# Patient Record
Sex: MALE | Race: WHITE | Employment: OTHER | URBAN - METROPOLITAN AREA
[De-identification: names, ages, dates, MRNs, and addresses within clinical notes are randomized per-mention and may not be internally consistent; named-entity substitution may affect disease eponyms.]

---

## 2019-12-01 ENCOUNTER — APPOINTMENT (OUTPATIENT)
Dept: ULTRASOUND IMAGING | Age: 74
DRG: 309 | End: 2019-12-01
Attending: EMERGENCY MEDICINE
Payer: MEDICARE

## 2019-12-01 ENCOUNTER — APPOINTMENT (OUTPATIENT)
Dept: CT IMAGING | Age: 74
DRG: 309 | End: 2019-12-01
Attending: EMERGENCY MEDICINE
Payer: MEDICARE

## 2019-12-01 ENCOUNTER — APPOINTMENT (OUTPATIENT)
Dept: GENERAL RADIOLOGY | Age: 74
DRG: 309 | End: 2019-12-01
Attending: EMERGENCY MEDICINE
Payer: MEDICARE

## 2019-12-01 ENCOUNTER — HOSPITAL ENCOUNTER (INPATIENT)
Age: 74
LOS: 4 days | Discharge: HOME | DRG: 309 | End: 2019-12-05
Attending: EMERGENCY MEDICINE | Admitting: INTERNAL MEDICINE
Payer: MEDICARE

## 2019-12-01 DIAGNOSIS — Z85.528 HISTORY OF RENAL CELL CARCINOMA: ICD-10-CM

## 2019-12-01 DIAGNOSIS — R07.9 ACUTE CHEST PAIN: Primary | ICD-10-CM

## 2019-12-01 DIAGNOSIS — Z86.79 HISTORY OF CORONARY ARTERY DISEASE: ICD-10-CM

## 2019-12-01 DIAGNOSIS — R31.9 HEMATURIA, UNSPECIFIED TYPE: ICD-10-CM

## 2019-12-01 DIAGNOSIS — I48.91 ATRIAL FIBRILLATION, UNSPECIFIED TYPE (HCC): ICD-10-CM

## 2019-12-01 PROBLEM — C64.9 RENAL CELL CARCINOMA (HCC): Status: ACTIVE | Noted: 2019-12-01

## 2019-12-01 LAB
ALBUMIN SERPL-MCNC: 3.2 G/DL (ref 3.5–5)
ALBUMIN/GLOB SERPL: 0.8 {RATIO} (ref 1.1–2.2)
ALP SERPL-CCNC: 691 U/L (ref 45–117)
ALT SERPL-CCNC: 57 U/L (ref 12–78)
ANION GAP SERPL CALC-SCNC: 12 MMOL/L (ref 5–15)
APPEARANCE UR: CLEAR
AST SERPL-CCNC: 53 U/L (ref 15–37)
BACTERIA URNS QL MICRO: NEGATIVE /HPF
BASOPHILS # BLD: 0.1 K/UL (ref 0–0.1)
BASOPHILS NFR BLD: 1 % (ref 0–1)
BILIRUB SERPL-MCNC: 0.7 MG/DL (ref 0.2–1)
BILIRUB UR QL: NEGATIVE
BUN SERPL-MCNC: 40 MG/DL (ref 6–20)
BUN/CREAT SERPL: 25 (ref 12–20)
CALCIUM SERPL-MCNC: 9.3 MG/DL (ref 8.5–10.1)
CHLORIDE SERPL-SCNC: 99 MMOL/L (ref 97–108)
CK SERPL-CCNC: 228 U/L (ref 39–308)
CO2 SERPL-SCNC: 27 MMOL/L (ref 21–32)
COLOR UR: ABNORMAL
CREAT SERPL-MCNC: 1.63 MG/DL (ref 0.7–1.3)
DIFFERENTIAL METHOD BLD: ABNORMAL
EOSINOPHIL # BLD: 0.1 K/UL (ref 0–0.4)
EOSINOPHIL NFR BLD: 1 % (ref 0–7)
EPITH CASTS URNS QL MICRO: ABNORMAL /LPF
ERYTHROCYTE [DISTWIDTH] IN BLOOD BY AUTOMATED COUNT: 18.6 % (ref 11.5–14.5)
GLOBULIN SER CALC-MCNC: 4 G/DL (ref 2–4)
GLUCOSE BLD STRIP.AUTO-MCNC: 224 MG/DL (ref 65–100)
GLUCOSE BLD STRIP.AUTO-MCNC: 251 MG/DL (ref 65–100)
GLUCOSE SERPL-MCNC: 144 MG/DL (ref 65–100)
GLUCOSE UR STRIP.AUTO-MCNC: NEGATIVE MG/DL
HCT VFR BLD AUTO: 45.2 % (ref 36.6–50.3)
HGB BLD-MCNC: 14.5 G/DL (ref 12.1–17)
HGB UR QL STRIP: NEGATIVE
IMM GRANULOCYTES # BLD AUTO: 0.1 K/UL (ref 0–0.04)
IMM GRANULOCYTES NFR BLD AUTO: 1 % (ref 0–0.5)
KETONES UR QL STRIP.AUTO: NEGATIVE MG/DL
LEUKOCYTE ESTERASE UR QL STRIP.AUTO: NEGATIVE
LYMPHOCYTES # BLD: 2.1 K/UL (ref 0.8–3.5)
LYMPHOCYTES NFR BLD: 20 % (ref 12–49)
MCH RBC QN AUTO: 29.7 PG (ref 26–34)
MCHC RBC AUTO-ENTMCNC: 32.1 G/DL (ref 30–36.5)
MCV RBC AUTO: 92.4 FL (ref 80–99)
MONOCYTES # BLD: 1 K/UL (ref 0–1)
MONOCYTES NFR BLD: 10 % (ref 5–13)
NEUTS SEG # BLD: 7.3 K/UL (ref 1.8–8)
NEUTS SEG NFR BLD: 67 % (ref 32–75)
NITRITE UR QL STRIP.AUTO: NEGATIVE
NRBC # BLD: 0 K/UL (ref 0–0.01)
NRBC BLD-RTO: 0 PER 100 WBC
PH UR STRIP: 6.5 [PH] (ref 5–8)
PLATELET # BLD AUTO: 269 K/UL (ref 150–400)
PMV BLD AUTO: 8.6 FL (ref 8.9–12.9)
POTASSIUM SERPL-SCNC: 3.3 MMOL/L (ref 3.5–5.1)
PROT SERPL-MCNC: 7.2 G/DL (ref 6.4–8.2)
PROT UR STRIP-MCNC: 30 MG/DL
RBC # BLD AUTO: 4.89 M/UL (ref 4.1–5.7)
RBC #/AREA URNS HPF: ABNORMAL /HPF (ref 0–5)
SERVICE CMNT-IMP: ABNORMAL
SERVICE CMNT-IMP: ABNORMAL
SODIUM SERPL-SCNC: 138 MMOL/L (ref 136–145)
SP GR UR REFRACTOMETRY: 1.01 (ref 1–1.03)
TROPONIN I SERPL-MCNC: <0.05 NG/ML
UA: UC IF INDICATED,UAUC: ABNORMAL
UROBILINOGEN UR QL STRIP.AUTO: 1 EU/DL (ref 0.2–1)
WBC # BLD AUTO: 10.6 K/UL (ref 4.1–11.1)
WBC URNS QL MICRO: ABNORMAL /HPF (ref 0–4)

## 2019-12-01 PROCEDURE — 81001 URINALYSIS AUTO W/SCOPE: CPT

## 2019-12-01 PROCEDURE — 76770 US EXAM ABDO BACK WALL COMP: CPT

## 2019-12-01 PROCEDURE — 99285 EMERGENCY DEPT VISIT HI MDM: CPT

## 2019-12-01 PROCEDURE — 96375 TX/PRO/DX INJ NEW DRUG ADDON: CPT

## 2019-12-01 PROCEDURE — 96374 THER/PROPH/DIAG INJ IV PUSH: CPT

## 2019-12-01 PROCEDURE — 82550 ASSAY OF CK (CPK): CPT

## 2019-12-01 PROCEDURE — 82962 GLUCOSE BLOOD TEST: CPT

## 2019-12-01 PROCEDURE — 74011250637 HC RX REV CODE- 250/637: Performed by: EMERGENCY MEDICINE

## 2019-12-01 PROCEDURE — 74011250636 HC RX REV CODE- 250/636: Performed by: EMERGENCY MEDICINE

## 2019-12-01 PROCEDURE — 71045 X-RAY EXAM CHEST 1 VIEW: CPT

## 2019-12-01 PROCEDURE — 65660000000 HC RM CCU STEPDOWN

## 2019-12-01 PROCEDURE — 36415 COLL VENOUS BLD VENIPUNCTURE: CPT

## 2019-12-01 PROCEDURE — 85025 COMPLETE CBC W/AUTO DIFF WBC: CPT

## 2019-12-01 PROCEDURE — 84484 ASSAY OF TROPONIN QUANT: CPT

## 2019-12-01 PROCEDURE — 74011250636 HC RX REV CODE- 250/636: Performed by: INTERNAL MEDICINE

## 2019-12-01 PROCEDURE — 74176 CT ABD & PELVIS W/O CONTRAST: CPT

## 2019-12-01 PROCEDURE — 93005 ELECTROCARDIOGRAM TRACING: CPT

## 2019-12-01 PROCEDURE — 65270000029 HC RM PRIVATE

## 2019-12-01 PROCEDURE — 80053 COMPREHEN METABOLIC PANEL: CPT

## 2019-12-01 PROCEDURE — 74011250637 HC RX REV CODE- 250/637: Performed by: INTERNAL MEDICINE

## 2019-12-01 PROCEDURE — 74011636637 HC RX REV CODE- 636/637: Performed by: INTERNAL MEDICINE

## 2019-12-01 PROCEDURE — 71250 CT THORAX DX C-: CPT

## 2019-12-01 RX ORDER — TERAZOSIN 5 MG/1
10 CAPSULE ORAL
Status: DISCONTINUED | OUTPATIENT
Start: 2019-12-01 | End: 2019-12-05 | Stop reason: HOSPADM

## 2019-12-01 RX ORDER — INSULIN LISPRO 100 [IU]/ML
25 INJECTION, SOLUTION INTRAVENOUS; SUBCUTANEOUS
Status: DISCONTINUED | OUTPATIENT
Start: 2019-12-01 | End: 2019-12-05 | Stop reason: HOSPADM

## 2019-12-01 RX ORDER — SENNOSIDES 8.6 MG/1
1 TABLET ORAL
Status: DISCONTINUED | OUTPATIENT
Start: 2019-12-01 | End: 2019-12-05 | Stop reason: HOSPADM

## 2019-12-01 RX ORDER — POTASSIUM CHLORIDE 750 MG/1
30 TABLET, FILM COATED, EXTENDED RELEASE ORAL 2 TIMES DAILY
COMMUNITY
End: 2020-02-18

## 2019-12-01 RX ORDER — FUROSEMIDE 10 MG/ML
40 INJECTION INTRAMUSCULAR; INTRAVENOUS
Status: COMPLETED | OUTPATIENT
Start: 2019-12-01 | End: 2019-12-01

## 2019-12-01 RX ORDER — MORPHINE SULFATE 2 MG/ML
6 INJECTION, SOLUTION INTRAMUSCULAR; INTRAVENOUS
Status: COMPLETED | OUTPATIENT
Start: 2019-12-01 | End: 2019-12-01

## 2019-12-01 RX ORDER — ASPIRIN 81 MG/1
81 TABLET ORAL DAILY
COMMUNITY
End: 2019-12-05

## 2019-12-01 RX ORDER — ATORVASTATIN CALCIUM 20 MG/1
20 TABLET, FILM COATED ORAL
Status: DISCONTINUED | OUTPATIENT
Start: 2019-12-01 | End: 2019-12-05 | Stop reason: HOSPADM

## 2019-12-01 RX ORDER — DOCUSATE SODIUM 100 MG/1
200 CAPSULE, LIQUID FILLED ORAL 3 TIMES DAILY
COMMUNITY
End: 2020-02-18

## 2019-12-01 RX ORDER — INSULIN GLARGINE 100 [IU]/ML
50 INJECTION, SOLUTION SUBCUTANEOUS DAILY
COMMUNITY

## 2019-12-01 RX ORDER — ACETAMINOPHEN 325 MG/1
650 TABLET ORAL
Status: DISCONTINUED | OUTPATIENT
Start: 2019-12-01 | End: 2019-12-05 | Stop reason: HOSPADM

## 2019-12-01 RX ORDER — DEXTROSE MONOHYDRATE 25 G/50ML
12.5-25 INJECTION, SOLUTION INTRAVENOUS AS NEEDED
Status: DISCONTINUED | OUTPATIENT
Start: 2019-12-01 | End: 2019-12-05 | Stop reason: HOSPADM

## 2019-12-01 RX ORDER — METOPROLOL TARTRATE 50 MG/1
50 TABLET ORAL 2 TIMES DAILY
Status: ON HOLD | COMMUNITY
End: 2019-12-05 | Stop reason: SDUPTHER

## 2019-12-01 RX ORDER — SODIUM CHLORIDE 0.9 % (FLUSH) 0.9 %
5-40 SYRINGE (ML) INJECTION AS NEEDED
Status: DISCONTINUED | OUTPATIENT
Start: 2019-12-01 | End: 2019-12-05 | Stop reason: HOSPADM

## 2019-12-01 RX ORDER — NALOXONE HYDROCHLORIDE 0.4 MG/ML
0.4 INJECTION, SOLUTION INTRAMUSCULAR; INTRAVENOUS; SUBCUTANEOUS AS NEEDED
Status: DISCONTINUED | OUTPATIENT
Start: 2019-12-01 | End: 2019-12-05 | Stop reason: HOSPADM

## 2019-12-01 RX ORDER — ATORVASTATIN CALCIUM 80 MG/1
40 TABLET, FILM COATED ORAL
COMMUNITY

## 2019-12-01 RX ORDER — CHLORTHALIDONE 25 MG/1
25 TABLET ORAL DAILY
COMMUNITY
End: 2019-12-05

## 2019-12-01 RX ORDER — METOPROLOL TARTRATE 50 MG/1
100 TABLET ORAL 2 TIMES DAILY
Status: DISCONTINUED | OUTPATIENT
Start: 2019-12-01 | End: 2019-12-05 | Stop reason: HOSPADM

## 2019-12-01 RX ORDER — FEBUXOSTAT 40 MG/1
40 TABLET, FILM COATED ORAL DAILY
Status: DISCONTINUED | OUTPATIENT
Start: 2019-12-02 | End: 2019-12-05 | Stop reason: HOSPADM

## 2019-12-01 RX ORDER — INSULIN LISPRO 100 [IU]/ML
INJECTION, SOLUTION INTRAVENOUS; SUBCUTANEOUS
Status: DISCONTINUED | OUTPATIENT
Start: 2019-12-01 | End: 2019-12-05 | Stop reason: HOSPADM

## 2019-12-01 RX ORDER — OXYCODONE AND ACETAMINOPHEN 5; 325 MG/1; MG/1
1 TABLET ORAL
COMMUNITY
End: 2020-02-18

## 2019-12-01 RX ORDER — MAGNESIUM SULFATE 100 %
4 CRYSTALS MISCELLANEOUS AS NEEDED
Status: DISCONTINUED | OUTPATIENT
Start: 2019-12-01 | End: 2019-12-05 | Stop reason: HOSPADM

## 2019-12-01 RX ORDER — DOCUSATE SODIUM 100 MG/1
100 CAPSULE, LIQUID FILLED ORAL 2 TIMES DAILY
Status: DISCONTINUED | OUTPATIENT
Start: 2019-12-01 | End: 2019-12-05 | Stop reason: HOSPADM

## 2019-12-01 RX ORDER — TORSEMIDE 20 MG/1
10 TABLET ORAL 2 TIMES DAILY
Status: DISCONTINUED | OUTPATIENT
Start: 2019-12-02 | End: 2019-12-05 | Stop reason: HOSPADM

## 2019-12-01 RX ORDER — SODIUM CHLORIDE 0.9 % (FLUSH) 0.9 %
5-40 SYRINGE (ML) INJECTION EVERY 8 HOURS
Status: DISCONTINUED | OUTPATIENT
Start: 2019-12-01 | End: 2019-12-05 | Stop reason: HOSPADM

## 2019-12-01 RX ORDER — MORPHINE SULFATE 2 MG/ML
1 INJECTION, SOLUTION INTRAMUSCULAR; INTRAVENOUS
Status: DISCONTINUED | OUTPATIENT
Start: 2019-12-01 | End: 2019-12-05 | Stop reason: HOSPADM

## 2019-12-01 RX ORDER — FINASTERIDE 5 MG/1
5 TABLET, FILM COATED ORAL
COMMUNITY

## 2019-12-01 RX ORDER — INSULIN GLARGINE 100 [IU]/ML
50 INJECTION, SOLUTION SUBCUTANEOUS DAILY
Status: DISCONTINUED | OUTPATIENT
Start: 2019-12-02 | End: 2019-12-05 | Stop reason: HOSPADM

## 2019-12-01 RX ORDER — ASPIRIN 81 MG/1
81 TABLET ORAL DAILY
Status: DISCONTINUED | OUTPATIENT
Start: 2019-12-02 | End: 2019-12-03

## 2019-12-01 RX ORDER — FEBUXOSTAT 80 MG/1
80 TABLET, FILM COATED ORAL DAILY
COMMUNITY

## 2019-12-01 RX ORDER — OXYCODONE AND ACETAMINOPHEN 5; 325 MG/1; MG/1
1 TABLET ORAL
Status: DISCONTINUED | OUTPATIENT
Start: 2019-12-01 | End: 2019-12-05 | Stop reason: HOSPADM

## 2019-12-01 RX ORDER — FINASTERIDE 5 MG/1
5 TABLET, FILM COATED ORAL
Status: DISCONTINUED | OUTPATIENT
Start: 2019-12-01 | End: 2019-12-05 | Stop reason: HOSPADM

## 2019-12-01 RX ORDER — TERAZOSIN 10 MG/1
2 CAPSULE ORAL
COMMUNITY

## 2019-12-01 RX ORDER — TORSEMIDE 20 MG/1
60 TABLET ORAL 2 TIMES DAILY
COMMUNITY
End: 2020-02-18

## 2019-12-01 RX ORDER — POTASSIUM CHLORIDE 750 MG/1
20 TABLET, FILM COATED, EXTENDED RELEASE ORAL 2 TIMES DAILY
Status: DISCONTINUED | OUTPATIENT
Start: 2019-12-01 | End: 2019-12-04 | Stop reason: SDUPTHER

## 2019-12-01 RX ADMIN — MORPHINE SULFATE 6 MG: 2 INJECTION, SOLUTION INTRAMUSCULAR; INTRAVENOUS at 13:31

## 2019-12-01 RX ADMIN — TERAZOSIN HYDROCHLORIDE 10 MG: 5 CAPSULE ORAL at 21:44

## 2019-12-01 RX ADMIN — ATORVASTATIN CALCIUM 20 MG: 20 TABLET, FILM COATED ORAL at 21:43

## 2019-12-01 RX ADMIN — SENNOSIDES 8.6 MG: 8.6 TABLET, FILM COATED ORAL at 21:43

## 2019-12-01 RX ADMIN — MORPHINE SULFATE 1 MG: 2 INJECTION, SOLUTION INTRAMUSCULAR; INTRAVENOUS at 21:57

## 2019-12-01 RX ADMIN — POTASSIUM CHLORIDE 20 MEQ: 750 TABLET, FILM COATED, EXTENDED RELEASE ORAL at 18:10

## 2019-12-01 RX ADMIN — DOCUSATE SODIUM 100 MG: 100 CAPSULE, LIQUID FILLED ORAL at 18:09

## 2019-12-01 RX ADMIN — INSULIN LISPRO 25 UNITS: 100 INJECTION, SOLUTION INTRAVENOUS; SUBCUTANEOUS at 18:11

## 2019-12-01 RX ADMIN — METOPROLOL TARTRATE 100 MG: 50 TABLET, FILM COATED ORAL at 18:09

## 2019-12-01 RX ADMIN — FUROSEMIDE 40 MG: 10 INJECTION, SOLUTION INTRAMUSCULAR; INTRAVENOUS at 13:31

## 2019-12-01 RX ADMIN — FINASTERIDE 5 MG: 5 TABLET, FILM COATED ORAL at 21:44

## 2019-12-01 RX ADMIN — INSULIN LISPRO 5 UNITS: 100 INJECTION, SOLUTION INTRAVENOUS; SUBCUTANEOUS at 18:11

## 2019-12-01 RX ADMIN — INSULIN LISPRO 2 UNITS: 100 INJECTION, SOLUTION INTRAVENOUS; SUBCUTANEOUS at 21:56

## 2019-12-01 RX ADMIN — NITROGLYCERIN 1 INCH: 20 OINTMENT TOPICAL at 13:31

## 2019-12-01 NOTE — CONSULTS
Consult/Admission    NAME: Ghada Evans   :  1945   MRN:  [id number]     Date/Time:  2019 4:35 PM    Patient PCP: Zoraida Pastor MD  ________________________________________________________________________     Assessment:     Chest pain   Hx of CAD    Unknown LV function. Remote PCI at [facility] center years ago. Atrial Fibrillation of unknown duration   Renal cell CA , s/p R nephrectomy , now with metastatic disease. Diabetes type 2   Severe Diabetic neupropathy. He is pretty much bed bound, uses a beside commode. Unable to walk with a walker. Hx of CHF   HTN   Hyperlipidemia. Gross hematuria present today . Morbid Obesity . CKD 3         Plan: Will review Echo when available   Medical management at this time. Will need to see more information to help formulate a plan . Would help to know the status of his cancer. With the gross hematuria today ,  Concerned about anticoagulation. Will hold off on that as he has not been on it is outpatient and will need to get hematuria assessed here. [x]           High complexity decision making was performed        Subjective:   CHIEF COMPLAINT:     HISTORY OF PRESENT ILLNESS:       He describes atypical chest pain ,  Left lower rib cage, this AM .  Not angina quality. Took some Nitro as noted, does not seem that changed it. Still with some pain over L lower rib cage area. EKG A Fib,  nsstt chanbes,   No ischemic changes. TRopon. Is normal.  CT scan of chest and abdomen done without contrast.  Cannot exclude PE     HPI: Ghada Evans, 75 y.o. male presents to the ED with chest pain this morning starting at approximately 4:30 AM.  He took 2 initial nitroglycerin which brought some relief of his discomfort which he describes as 5 out of 10 pressure in the center of his chest.  He took a third nitro but excellently swallowed it and has had no effect.   He is normally a [place] patient, currently getting chemotherapy for renal cell carcinoma that was recurrent after a right nephrectomy. He also has a history, after contacting the [place] of coronary disease, CHF, diabetes, hypertension and hypertriglyceridemia. He does not have a history of atrial fibrillation. His wife notices on his blood pressure cuff measurements at home that the heart icon has been beeping irregularly for approximately 3 to 4 months. He has not suffered any syncope and is here today primarily for his pain. He denies any fever or recent cough        Past Medical History:   Diagnosis Date    [facility] ([facility]     Diabetes ([facility]     Hypertension       Past Surgical History:   Procedure Laterality Date    HX NEPHROSTOMY      HX ORTHOPAEDIC       Allergies   Allergen Reactions    Paradione Hives      Meds:  See below  Social History     Tobacco Use    Smoking status: Never Smoker    Smokeless tobacco: Never Used   Substance Use Topics    Alcohol use: Never     Frequency: Never      History reviewed. No pertinent family history. REVIEW OF SYSTEMS:     []            Unable to obtain  ROS due to ---   [x]            Total of 12 systems reviewed as follows:    Constitutional: negative fever, negative chills, negative weight loss  Eyes:   negative visual changes  ENT:   negative sore throat, tongue or lip swelling  Respiratory:  negative cough, negative dyspnea  Cards:  negative for chest pain, palpitations, lower extremity edema  GI:   negative for nausea, vomiting, diarrhea, and abdominal pain  Genitourinary: negative for frequency, dysuria  Integument:  negative for rash   Hematologic:  negative for easy bruising and gum/nose bleeding  Musculoskel: negative for myalgias,  back pain  Neurological:  negative for headaches, dizziness, vertigo, weakness  Behavl/Psych: negative for feelings of anxiety, depression     Pertinent Positives include :    Objective:      Physical Exam:    Last 24hrs VS reviewed since prior progress note.  Most recent are:    Visit Vitals  /90 (BP 1 Location: Right arm, BP Patient Position: At rest)   Pulse 94   Temp 98.2 °F (36.8 °C)   Resp 18   Ht 5' 11\" (1.803 m)   Wt 157.9 kg (348 lb)   SpO2 95%   BMI 48.54 kg/m²       Intake/Output Summary (Last 24 hours) at 12/1/2019 1635  Last data filed at 12/1/2019 1219  Gross per 24 hour   Intake    Output 150 ml   Net -150 ml        General Appearance: Well developed, well nourished, alert & oriented x 3,    no acute distress. Ears/Nose/Mouth/Throat: Pupils equal and round, Hearing grossly normal.  Neck: Supple. JVP within normal limits. Carotids good upstrokes, with no bruit. Chest: Lungs clear to auscultation bilaterally. Cardiovascular: Regular rate and rhythm, S1S2 normal, no murmur, rubs, gallops. Abdomen: Soft, non-tender, bowel sounds are active. No organomegaly. Extremities: No edema bilaterally. Femoral pulses +2, Distal Pulses +1. Skin: Warm and dry. Neuro: CN II-XII grossly intact, Strength and sensation grossly intact. Data:      Prior to Admission medications    Medication Sig Start Date End Date Taking? Authorizing Provider   oxyCODONE-acetaminophen (PERCOCET) 5-325 mg per tablet Take  by mouth every four (4) hours as needed for Pain. Yes Provider, Historical   chlorthalidone (HYGROTEN) 25 mg tablet Take  by mouth daily. Yes Provider, Historical   torsemide (DEMADEX) 10 mg tablet Take  by mouth three (3) times daily. Yes Provider, Historical   finasteride (PROSCAR) 5 mg tablet Take 5 mg by mouth nightly. Yes Provider, Historical   terazosin (HYTRIN) 10 mg capsule Take 10 mg by mouth nightly. Yes Provider, Historical   atorvastatin (LIPITOR) 10 mg tablet Take  by mouth nightly. Yes Provider, Historical   aspirin delayed-release 81 mg tablet Take 81 mg by mouth daily. Yes Provider, Historical   metoprolol tartrate (LOPRESSOR) 50 mg tablet Take  by mouth two (2) times a day.    Yes Provider, Historical   docusate sodium (COLACE) 100 mg capsule Take 100 mg by mouth two (2) times a day. Yes Provider, Historical   sennosides (SENNA) 8.6 mg cap Take  by mouth. Yes Provider, Historical   febuxostat (ULORIC) 40 mg tab tablet Take 40 mg by mouth daily. Yes Provider, Historical   potassium chloride SR (KLOR-CON 10) 10 mEq tablet Take  by mouth two (2) times a day. Yes Provider, Historical   insulin glargine (LANTUS,BASAGLAR) 100 unit/mL (3 mL) inpn 50 Units by SubCUTAneous route daily. Yes Provider, Historical   insulin CONCENTRATED regular (HUMULIN R U-500) 500 unit/mL soln 200 Units by SubCUTAneous route Daily (before breakfast). Yes Provider, Historical   insulin CONCENTRATED regular (HUMULIN R U-500) 500 unit/mL soln 50 Units by SubCUTAneous route Daily (before dinner). Yes Provider, Historical       Recent Results (from the past 24 hour(s))   EKG, 12 LEAD, INITIAL    Collection Time: 12/01/19 11:30 AM   Result Value Ref Range    Ventricular Rate 90 BPM    Atrial Rate 119 BPM    QRS Duration 86 ms    Q-T Interval 360 ms    QTC Calculation (Bezet) 440 ms    Calculated R Axis -34 degrees    Calculated T Axis 92 degrees    Diagnosis       ** Poor data quality, interpretation may be adversely affected  Atrial fibrillation  Left axis deviation  Nonspecific ST and T wave abnormality  No previous ECGs available     CBC WITH AUTOMATED DIFF    Collection Time: 12/01/19 11:31 AM   Result Value Ref Range    WBC 10.6 4.1 - 11.1 K/uL    RBC 4.89 4. 10 - 5.70 M/uL    HGB 14.5 12.1 - 17.0 g/dL    HCT 45.2 36.6 - 50.3 %    MCV 92.4 80.0 - 99.0 FL    MCH 29.7 26.0 - 34.0 PG    MCHC 32.1 30.0 - 36.5 g/dL    RDW 18.6 (H) 11.5 - 14.5 %    PLATELET 303 126 - 805 K/uL    MPV 8.6 (L) 8.9 - 12.9 FL    NRBC 0.0 0  WBC    ABSOLUTE NRBC 0.00 0.00 - 0.01 K/uL    NEUTROPHILS 67 32 - 75 %    LYMPHOCYTES 20 12 - 49 %    MONOCYTES 10 5 - 13 %    EOSINOPHILS 1 0 - 7 %    BASOPHILS 1 0 - 1 %    IMMATURE GRANULOCYTES 1 (H) 0.0 - 0.5 %    ABS.  NEUTROPHILS 7.3 1.8 - 8.0 K/UL    ABS. LYMPHOCYTES 2.1 0.8 - 3.5 K/UL    ABS. MONOCYTES 1.0 0.0 - 1.0 K/UL    ABS. EOSINOPHILS 0.1 0.0 - 0.4 K/UL    ABS. BASOPHILS 0.1 0.0 - 0.1 K/UL    ABS. IMM. GRANS. 0.1 (H) 0.00 - 0.04 K/UL    DF AUTOMATED     METABOLIC PANEL, COMPREHENSIVE    Collection Time: 12/01/19 11:31 AM   Result Value Ref Range    Sodium 138 136 - 145 mmol/L    Potassium 3.3 (L) 3.5 - 5.1 mmol/L    Chloride 99 97 - 108 mmol/L    CO2 27 21 - 32 mmol/L    Anion gap 12 5 - 15 mmol/L    Glucose 144 (H) 65 - 100 mg/dL    BUN 40 (H) 6 - 20 MG/DL    Creatinine 1.63 (H) 0.70 - 1.30 MG/DL    BUN/Creatinine ratio 25 (H) 12 - 20      GFR est AA 50 (L) >60 ml/min/1.73m2    GFR est non-AA 42 (L) >60 ml/min/1.73m2    Calcium 9.3 8.5 - 10.1 MG/DL    Bilirubin, total 0.7 0.2 - 1.0 MG/DL    ALT (SGPT) 57 12 - 78 U/L    AST (SGOT) 53 (H) 15 - 37 U/L    Alk.  phosphatase 691 (H) 45 - 117 U/L    Protein, total 7.2 6.4 - 8.2 g/dL    Albumin 3.2 (L) 3.5 - 5.0 g/dL    Globulin 4.0 2.0 - 4.0 g/dL    A-G Ratio 0.8 (L) 1.1 - 2.2     CK W/ REFLX CKMB    Collection Time: 12/01/19 11:31 AM   Result Value Ref Range     39 - 308 U/L   URINALYSIS W/ REFLEX CULTURE    Collection Time: 12/01/19 12:21 PM   Result Value Ref Range    Color YELLOW/STRAW      Appearance CLEAR CLEAR      Specific gravity 1.014 1.003 - 1.030      pH (UA) 6.5 5.0 - 8.0      Protein 30 (A) NEG mg/dL    Glucose NEGATIVE  NEG mg/dL    Ketone NEGATIVE  NEG mg/dL    Bilirubin NEGATIVE  NEG      Blood NEGATIVE  NEG      Urobilinogen 1.0 0.2 - 1.0 EU/dL    Nitrites NEGATIVE  NEG      Leukocyte Esterase NEGATIVE  NEG      WBC 0-4 0 - 4 /hpf    RBC 5-10 0 - 5 /hpf    Epithelial cells FEW FEW /lpf    Bacteria NEGATIVE  NEG /hpf    UA:UC IF INDICATED CULTURE NOT INDICATED BY UA RESULT CNI     TROPONIN I    Collection Time: 12/01/19 12:59 PM   Result Value Ref Range    Troponin-I, Qt. <0.05 <0.05 ng/mL

## 2019-12-01 NOTE — H&P
Hospitalist Admission Note    NAME: Zarina Escobar   :  1945   MRN:  [id number]     Date/Time:  2019 3:38 PM    Patient PCP: Eduardo Callejas MD at [place], [place] at [place]  ______________________________________________________________________  Given the patient's current clinical presentation, I have a high level of concern for decompensation if discharged from the emergency department. Complex decision making was performed, which includes reviewing the patient's available past medical records, laboratory results, and x-ray films. My assessment of this patient's clinical condition and my plan of care is as follows. Assessment / Plan:  New onset A. Fib POA  Causing chest pain POA since today a.m. History of CAD status post stent   History of CHF ? systolic  EKG A. fib at 90 beats a minute  Troponins negative  CTA chest abdomen and pelvis negative for dissection or any other acute etiology for chest pain, evidence of metastatic Renal cell carcinoma with mets to left pelvic bone, lungs noted  Renal ultrasound pending  UA negative    Admit to telemetry bed  Will hold off on any anticoagulation due to history of renal cell carcinoma status post nephrectomy R-with intermittent hematuria as per the family  Continue metoprolol as at home-increase the dose to 100 mg twice daily  Check 2D echo  Inpatient cardiology consulted-for further recommendations  Continue home dose torsemide, Lipitor , aspirin, K supplement twice daily with diuretics    Metastatic renal cell carcinoma status post right nephrectomy-mets to bones, lungs as per wife, currently on chemotherapy per urology at [place]    No change in the long-term care plan continuing palliative chemotherapy  Observe hematuria for now, will be difficult to anticoagulate    Continue Proscar, Hytrin    Diabetes type 2 insulin-dependent.   Continue home regimen of Lantus 50 units daily in the morning  SSI lispro here  Fingersticks q. before meals and at bedtime    Morbid obesity POA  Weight loss would be challenging due to nonambulatory status at baseline    Code Status:  full code as per patient's wishes in the ER  Surrogate Decision Maker: Wife Wilfrido Trevino #[phone]    DVT Prophylaxis: SCDs  GI Prophylaxis: not indicated    Baseline: Patient lives with his wife at home pretty much nonambulatory at baseline, uses electric scooter to move around to get to his appointments , otherwise uses walker at home as per wife      Subjective:   CHIEF COMPLAINT: Chest pain and waking up today morning    HISTORY OF PRESENT ILLNESS:     Willie Pulido is a 75 y.o.  male who presents with with above complaints from home via EMS with wife. Patient presents with chief complaint of sudden onset chest pain since 4 in the morning, which initially responded to nitroglycerin x2 as per patient but persisted through the morning. History of associated palpitations,  History of right renal cell carcinoma/status post nephrectomy-on chemotherapy currently at [place]  History of CAD status post stents as per patient, is on diuretic torsemide- ?  CHF likely systolic    Patient was found to have new onset A. fib on EKG/monitor in the ER which was relatively rate controlled in the 90s-100s/min, unremarkable CTA chest abdomen and pelvis for any other acute abnormality. Patient was found to have JORGE/questionable CKD with hypokalemia and elevated LFTs with hematuria and ER. We were asked to admit for work up and evaluation of the above problems.      Past Medical History:   Diagnosis Date    Cancer ([facility]     Diabetes ([facility]     Hypertension         Past Surgical History:   Procedure Laterality Date    HX NEPHROSTOMY      HX ORTHOPAEDIC         Social History     Tobacco Use    Smoking status: Never Smoker    Smokeless tobacco: Never Used   Substance Use Topics    Alcohol use: Never     Frequency: Never      Family history  Does not recollect any history of prostate/renal cell carcinoma and family  Admits to have CAD, diabetes and family    Allergies   Allergen Reactions    Paradione Hives        Prior to Admission medications    Not on File       REVIEW OF SYSTEMS:         Total of 12 systems reviewed as follows:       POSITIVE= underlined text  Negative = text not underlined  General:  fever, chills, sweats, generalized weakness, weight loss/gain,      loss of appetite   Eyes:    blurred vision, eye pain, loss of vision, double vision  ENT:    rhinorrhea, pharyngitis   Respiratory:   cough, sputum production, SOB, LOMBARDI, wheezing, pleuritic pain   Cardiology:   chest pain, palpitations, orthopnea, PND, edema, syncope   Gastrointestinal:  abdominal pain , N/V, diarrhea, dysphagia, constipation, bleeding   Genitourinary:  frequency, urgency, dysuria, hematuria, incontinence   Muskuloskeletal :  arthralgia, myalgia, back pain  Hematology:  easy bruising, nose or gum bleeding, lymphadenopathy   Dermatological: rash, ulceration, pruritis, color change / jaundice  Endocrine:   hot flashes or polydipsia   Neurological:  headache, dizziness, confusion, focal weakness, paresthesia,     Speech difficulties, memory loss, gait difficulty  Psychological: Feelings of anxiety, depression, agitation    Objective:   VITALS:    Visit Vitals  /90 (BP 1 Location: Right arm, BP Patient Position: At rest)   Pulse 94   Temp 98.2 °F (36.8 °C)   Resp 18   Ht 5' 11\" (1.803 m)   Wt 157.9 kg (348 lb)   SpO2 95%   BMI 48.54 kg/m²       PHYSICAL EXAM:    General:    Alert, cooperative, no distress, appears stated age. Morbidly obese+     HEENT: Atraumatic, anicteric sclerae, pink conjunctivae     No oral ulcers, mucosa moist, throat clear, dentition fair  Neck:  Supple, symmetrical,  thyroid: non tender  Lungs:   Clear to auscultation bilaterally. No Wheezing or Rhonchi. No rales. Chest wall:  No tenderness  No Accessory muscle use.   Heart:   Irregular rhythm +,  No  murmur   2+ lower extremity pitting edema  Abdomen:   Soft, non-tender. Not distended. Bowel sounds normal  Extremities: No cyanosis. No clubbing,      Skin turgor normal, Capillary refill normal, Radial dial pulse 2+  Skin:     Not pale. Not Jaundiced  No rashes   Psych:  Good insight. Not depressed. Not anxious or agitated. Neurologic: EOMs intact. No facial asymmetry. No aphasia or slurred speech. Symmetrical strength, Sensation grossly intact. Alert and oriented X 4.     _______________________________________________________________________  Care Plan discussed with:    Comments   Patient x    Family  x  wife at bedside in ER   RN x    Care Manager                    Consultant:       _______________________________________________________________________  Expected  Disposition:   Home with Family    HH/PT/OT/RN x   SNF/LTC    EDUARDO    ________________________________________________________________________  TOTAL TIME:  64 Minutes    Critical Care Provided     Minutes non procedure based      Comments    x Reviewed previous records   >50% of visit spent in counseling and coordination of care x Discussion with patient and family and questions answered       ________________________________________________________________________  Signed: Mehul Pretty MD    Procedures: see electronic medical records for all procedures/Xrays and details which were not copied into this note but were reviewed prior to creation of Plan.     LAB DATA REVIEWED:    Recent Results (from the past 24 hour(s))   EKG, 12 LEAD, INITIAL    Collection Time: 12/01/19 11:30 AM   Result Value Ref Range    Ventricular Rate 90 BPM    Atrial Rate 119 BPM    QRS Duration 86 ms    Q-T Interval 360 ms    QTC Calculation (Bezet) 440 ms    Calculated R Axis -34 degrees    Calculated T Axis 92 degrees    Diagnosis       ** Poor data quality, interpretation may be adversely affected  Atrial fibrillation  Left axis deviation  Nonspecific ST and T wave abnormality  No previous ECGs available     CBC WITH AUTOMATED DIFF    Collection Time: 12/01/19 11:31 AM   Result Value Ref Range    WBC 10.6 4.1 - 11.1 K/uL    RBC 4.89 4. 10 - 5.70 M/uL    HGB 14.5 12.1 - 17.0 g/dL    HCT 45.2 36.6 - 50.3 %    MCV 92.4 80.0 - 99.0 FL    MCH 29.7 26.0 - 34.0 PG    MCHC 32.1 30.0 - 36.5 g/dL    RDW 18.6 (H) 11.5 - 14.5 %    PLATELET 716 119 - 664 K/uL    MPV 8.6 (L) 8.9 - 12.9 FL    NRBC 0.0 0  WBC    ABSOLUTE NRBC 0.00 0.00 - 0.01 K/uL    NEUTROPHILS 67 32 - 75 %    LYMPHOCYTES 20 12 - 49 %    MONOCYTES 10 5 - 13 %    EOSINOPHILS 1 0 - 7 %    BASOPHILS 1 0 - 1 %    IMMATURE GRANULOCYTES 1 (H) 0.0 - 0.5 %    ABS. NEUTROPHILS 7.3 1.8 - 8.0 K/UL    ABS. LYMPHOCYTES 2.1 0.8 - 3.5 K/UL    ABS. MONOCYTES 1.0 0.0 - 1.0 K/UL    ABS. EOSINOPHILS 0.1 0.0 - 0.4 K/UL    ABS. BASOPHILS 0.1 0.0 - 0.1 K/UL    ABS. IMM. GRANS. 0.1 (H) 0.00 - 0.04 K/UL    DF AUTOMATED     METABOLIC PANEL, COMPREHENSIVE    Collection Time: 12/01/19 11:31 AM   Result Value Ref Range    Sodium 138 136 - 145 mmol/L    Potassium 3.3 (L) 3.5 - 5.1 mmol/L    Chloride 99 97 - 108 mmol/L    CO2 27 21 - 32 mmol/L    Anion gap 12 5 - 15 mmol/L    Glucose 144 (H) 65 - 100 mg/dL    BUN 40 (H) 6 - 20 MG/DL    Creatinine 1.63 (H) 0.70 - 1.30 MG/DL    BUN/Creatinine ratio 25 (H) 12 - 20      GFR est AA 50 (L) >60 ml/min/1.73m2    GFR est non-AA 42 (L) >60 ml/min/1.73m2    Calcium 9.3 8.5 - 10.1 MG/DL    Bilirubin, total 0.7 0.2 - 1.0 MG/DL    ALT (SGPT) 57 12 - 78 U/L    AST (SGOT) 53 (H) 15 - 37 U/L    Alk.  phosphatase 691 (H) 45 - 117 U/L    Protein, total 7.2 6.4 - 8.2 g/dL    Albumin 3.2 (L) 3.5 - 5.0 g/dL    Globulin 4.0 2.0 - 4.0 g/dL    A-G Ratio 0.8 (L) 1.1 - 2.2     CK W/ REFLX CKMB    Collection Time: 12/01/19 11:31 AM   Result Value Ref Range     39 - 308 U/L   URINALYSIS W/ REFLEX CULTURE    Collection Time: 12/01/19 12:21 PM   Result Value Ref Range    Color YELLOW/STRAW      Appearance CLEAR CLEAR      Specific gravity 1.014 1.003 - 1.030      pH (UA) 6.5 5.0 - 8.0      Protein 30 (A) NEG mg/dL    Glucose NEGATIVE  NEG mg/dL    Ketone NEGATIVE  NEG mg/dL    Bilirubin NEGATIVE  NEG      Blood NEGATIVE  NEG      Urobilinogen 1.0 0.2 - 1.0 EU/dL    Nitrites NEGATIVE  NEG      Leukocyte Esterase NEGATIVE  NEG      WBC 0-4 0 - 4 /hpf    RBC 5-10 0 - 5 /hpf    Epithelial cells FEW FEW /lpf    Bacteria NEGATIVE  NEG /hpf    UA:UC IF INDICATED CULTURE NOT INDICATED BY UA RESULT CNI     TROPONIN I    Collection Time: 12/01/19 12:59 PM   Result Value Ref Range    Troponin-I, Qt. <0.05 <0.05 ng/mL

## 2019-12-01 NOTE — ACP (ADVANCE CARE PLANNING)
Advance Care Planning Note      NAME: Aretha Benitez   :  1945   MRN:  [id number]     Date/Time:  2019 4:14 PM    Active Diagnoses:  Hospital Problems  Never Reviewed          Codes Class Noted POA    Renal cell carcinoma ([facility] [address] ICD-10-CM: C64.9  ICD-9-CM: 189.0  2019 Unknown        New onset a-fib [facility]) ICD-10-CM: I48.91  ICD-9-CM: 427.31  2019 Unknown        Hematuria ICD-10-CM: R31.9  ICD-9-CM: 599.70  2019 Unknown              These active diagnoses are of sufficient risk that focused discussion on advance care planning is indicated in order to allow the patient to thoughtfully consider personal goals of care, and if situations arise that prevent the ability to personally give input, to ensure appropriate representation of their personal desires for different levels and aggressiveness of care. Discussion:   Code status addressed and wants to be a Full Code. Patient wants central line and vasopressors if needed. Patient would also want a feeding tube, if needed, for nutritional support. Patient  would like to assign wife Pan Durán as the surrogate decision maker. Persons present and participating in discussion: Mery Tompkins MD, [address] [facility]      Time Spent:   Total time spent face-to-face in education and discussion:   16  minutes.          Mery aGrcia MD   Hospitalist

## 2019-12-01 NOTE — ED NOTES
Patient presents to the ED via EMS complaining of chest pain. Patient reports the chest pain started approximately 1 week ago. Patient reports waking up this morning with an increase in chest discomfort. Patient reports around 0400 taking his first nitro tablet, then waiting 5 minutes and taking another. Patient reports attempting to take the third tablet but swallowing it instead. Patient reports minimal relief from taking the nitro. Patient reports having a stent placed in the past. Patient also reports a PMH of DM. Patient placed on the monitor x3 and provided with his call bell. Patient's wife remains at bedside.

## 2019-12-01 NOTE — ED PROVIDER NOTES
EMERGENCY DEPARTMENT HISTORY AND PHYSICAL EXAM      Date: 12/1/2019  Patient Name: Geovanna Liriano    History of Presenting Illness     No chief complaint on file. History Provided By: Patient    HPI: Geovanna Liriano, 75 y.o. male presents to the ED with chest pain this morning starting at approximately 4:30 AM.  He took 2 initial nitroglycerin which brought some relief of his discomfort which he describes as 5 out of 10 pressure in the center of his chest.  He took a third nitro but excellently swallowed it and has had no effect. He is normally a [place] patient, currently getting chemotherapy for renal cell carcinoma that was recurrent after a right nephrectomy. He also has a history, after contacting the [place] of coronary disease, CHF, diabetes, hypertension and hypertriglyceridemia. He does not have a history of atrial fibrillation. His wife notices on his blood pressure cuff measurements at home that the heart icon has been beeping irregularly for approximately 3 to 4 months. He has not suffered any syncope and is here today primarily for his pain. He denies any fever or recent cough. There are no other complaints, changes, or physical findings at this time. PCP: Chava Laird MD    No current facility-administered medications on file prior to encounter. No current outpatient medications on file prior to encounter. Past History     Past Medical History:  Past Medical History:   Diagnosis Date    [facility] [address]     Diabetes [facility] [address]     Hypertension        Past Surgical History:  Past Surgical History:   Procedure Laterality Date    HX NEPHROSTOMY      HX ORTHOPAEDIC         Family History:  History reviewed. No pertinent family history. Social History:  Social History     Tobacco Use    Smoking status: Never Smoker    Smokeless tobacco: Never Used   Substance Use Topics    Alcohol use: Never     Frequency: Never    Drug use: Never       Allergies:   Allergies   Allergen Reactions  Paradione Hives         Review of Systems   Review of Systems   Constitutional: Negative. HENT: Negative. Eyes: Negative for visual disturbance. Respiratory: Positive for chest tightness and shortness of breath. Cardiovascular: Positive for chest pain and leg swelling. Gastrointestinal: Negative. Negative for abdominal pain. Genitourinary: Positive for hematuria. His wife reports zan blood from his penis today, takes a baby aspirin only, not on any other anticoagulation. Musculoskeletal: Negative for back pain. Neurological: Negative for dizziness, syncope and light-headedness. All other systems reviewed and are negative. Physical Exam   Physical Exam   Vital signs and nursing notes reviewed    CONSTITUTIONAL: Alert, in mild distress; well-developed; well-nourished. Morbidly obese body habitus. HEAD:  Normocephalic, atraumatic  EYES: PERRL; EOM's intact. ENTM: Nose: no rhinorrhea; Throat: no erythema or exudate, mucous membranes moist  Neck:  Supple. trachea is midline. No JVD. RESP: Chest clear, equal breath sounds. - W/R/R, O2 sat 95% on room air, respiratory rate was 20. CV: S1 and S2 WNL; No murmurs, gallops or rubs. 2+ radial and DP pulses bilaterally. Heart rate equals 78-97, irregular. GI: non-distended, normal bowel sounds, abdomen soft and non-tender. No masses or organomegaly. : No costo-vertebral angle tenderness. Dried blood at the meatus, circumcised. BACK:  Non-tender, normal appearance  UPPER EXT:  Normal inspection. no joint or soft tissue swelling  LOWER EXT: No edema, no calf tenderness. NEURO: Alert and oriented x3, 5/5 strength intact, decreased light touch sensation bilaterally, symmetric. SKIN: No rashes;  Warm and dry  PSYCH: Normal mood, normal affect    Diagnostic Study Results     Labs -     Recent Results (from the past 12 hour(s))   EKG, 12 LEAD, INITIAL    Collection Time: 12/01/19 11:30 AM   Result Value Ref Range    Ventricular Rate 90 BPM    Atrial Rate 119 BPM    QRS Duration 86 ms    Q-T Interval 360 ms    QTC Calculation (Bezet) 440 ms    Calculated R Axis -34 degrees    Calculated T Axis 92 degrees    Diagnosis       ** Poor data quality, interpretation may be adversely affected  Atrial fibrillation  Left axis deviation  Nonspecific ST and T wave abnormality  No previous ECGs available     CBC WITH AUTOMATED DIFF    Collection Time: 12/01/19 11:31 AM   Result Value Ref Range    WBC 10.6 4.1 - 11.1 K/uL    RBC 4.89 4. 10 - 5.70 M/uL    HGB 14.5 12.1 - 17.0 g/dL    HCT 45.2 36.6 - 50.3 %    MCV 92.4 80.0 - 99.0 FL    MCH 29.7 26.0 - 34.0 PG    MCHC 32.1 30.0 - 36.5 g/dL    RDW 18.6 (H) 11.5 - 14.5 %    PLATELET 763 103 - 443 K/uL    MPV 8.6 (L) 8.9 - 12.9 FL    NRBC 0.0 0  WBC    ABSOLUTE NRBC 0.00 0.00 - 0.01 K/uL    NEUTROPHILS 67 32 - 75 %    LYMPHOCYTES 20 12 - 49 %    MONOCYTES 10 5 - 13 %    EOSINOPHILS 1 0 - 7 %    BASOPHILS 1 0 - 1 %    IMMATURE GRANULOCYTES 1 (H) 0.0 - 0.5 %    ABS. NEUTROPHILS 7.3 1.8 - 8.0 K/UL    ABS. LYMPHOCYTES 2.1 0.8 - 3.5 K/UL    ABS. MONOCYTES 1.0 0.0 - 1.0 K/UL    ABS. EOSINOPHILS 0.1 0.0 - 0.4 K/UL    ABS. BASOPHILS 0.1 0.0 - 0.1 K/UL    ABS. IMM. GRANS. 0.1 (H) 0.00 - 0.04 K/UL    DF AUTOMATED     METABOLIC PANEL, COMPREHENSIVE    Collection Time: 12/01/19 11:31 AM   Result Value Ref Range    Sodium 138 136 - 145 mmol/L    Potassium 3.3 (L) 3.5 - 5.1 mmol/L    Chloride 99 97 - 108 mmol/L    CO2 27 21 - 32 mmol/L    Anion gap 12 5 - 15 mmol/L    Glucose 144 (H) 65 - 100 mg/dL    BUN 40 (H) 6 - 20 MG/DL    Creatinine 1.63 (H) 0.70 - 1.30 MG/DL    BUN/Creatinine ratio 25 (H) 12 - 20      GFR est AA 50 (L) >60 ml/min/1.73m2    GFR est non-AA 42 (L) >60 ml/min/1.73m2    Calcium 9.3 8.5 - 10.1 MG/DL    Bilirubin, total 0.7 0.2 - 1.0 MG/DL    ALT (SGPT) 57 12 - 78 U/L    AST (SGOT) 53 (H) 15 - 37 U/L    Alk.  phosphatase 691 (H) 45 - 117 U/L    Protein, total 7.2 6.4 - 8.2 g/dL    Albumin 3.2 (L) 3.5 - 5.0 g/dL    Globulin 4.0 2.0 - 4.0 g/dL    A-G Ratio 0.8 (L) 1.1 - 2.2     CK W/ REFLX CKMB    Collection Time: 12/01/19 11:31 AM   Result Value Ref Range     39 - 308 U/L   URINALYSIS W/ REFLEX CULTURE    Collection Time: 12/01/19 12:21 PM   Result Value Ref Range    Color YELLOW/STRAW      Appearance CLEAR CLEAR      Specific gravity 1.014 1.003 - 1.030      pH (UA) 6.5 5.0 - 8.0      Protein 30 (A) NEG mg/dL    Glucose NEGATIVE  NEG mg/dL    Ketone NEGATIVE  NEG mg/dL    Bilirubin NEGATIVE  NEG      Blood NEGATIVE  NEG      Urobilinogen 1.0 0.2 - 1.0 EU/dL    Nitrites NEGATIVE  NEG      Leukocyte Esterase NEGATIVE  NEG      WBC 0-4 0 - 4 /hpf    RBC 5-10 0 - 5 /hpf    Epithelial cells FEW FEW /lpf    Bacteria NEGATIVE  NEG /hpf    UA:UC IF INDICATED CULTURE NOT INDICATED BY UA RESULT CNI     TROPONIN I    Collection Time: 12/01/19 12:59 PM   Result Value Ref Range    Troponin-I, Qt. <0.05 <0.05 ng/mL       Radiologic Studies -   XR CHEST PORT   Final Result   IMPRESSION: Increased density right lung base is most likely related to   atelectasis. Soft tissue prominence right paratracheal region is most likely   vascular. However PA and lateral views are recommended when the patient is   stable. CT CHEST WO CONT    (Results Pending)   CT ABD PELV WO CONT    (Results Pending)   NM LUNG VENT/PERF    (Results Pending)     CT Results  (Last 48 hours)    None        CXR Results  (Last 48 hours)               12/01/19 1150  XR CHEST PORT Final result    Impression:  IMPRESSION: Increased density right lung base is most likely related to   atelectasis. Soft tissue prominence right paratracheal region is most likely   vascular. However PA and lateral views are recommended when the patient is   stable. Narrative:  EXAM:  XR CHEST PORT       INDICATION:  chest pain       COMPARISON:  None. FINDINGS: A portable AP radiograph of the chest was obtained at 1116 hours.  The   patient is on a cardiac monitor. Right basilar atelectasis. Left lung is clear   except for minor discoid atelectasis. Heart size is borderline enlarged. Right   paratracheal density is most likely vascular. Aorta is mildly ectatic. [The   bones and soft tissues are grossly within normal limits]. Medical Decision Making   I am the first provider for this patient. I reviewed the vital signs, available nursing notes, past medical history, past surgical history, family history and social history. Vital Signs-Reviewed the patient's vital signs. Patient Vitals for the past 12 hrs:   Temp Pulse Resp BP SpO2   12/01/19 1330 98.2 °F (36.8 °C) 94 18 122/90 95 %   12/01/19 1315  84 18 (!) 145/113 95 %   12/01/19 1245  76 13 145/79 95 %   12/01/19 1230  83 16 (!) 139/99 94 %   12/01/19 1215  80 13  94 %   12/01/19 1200  85 17 90/69 95 %   12/01/19 1145  83 18 127/61 95 %   12/01/19 1130  88 18 170/62    12/01/19 1128 99.2 °F (37.3 °C) 81 18 158/83 96 %       EKG interpretation: (Preliminary)  EKG performed 11:30 AM shows an atrial fib rhythm at a rate of 90 with a left axis deviation no visible acute ischemic changes normal QRS interval.  Discussion with physician at the [place] reports last EKG in 2016 was normal sinus rhythm. Records Reviewed: Nursing Notes and Old Medical Records    Provider Notes (Medical Decision Making):   67-year-old male here with acute chest pain, initial reassuring troponin but likely new diagnosis of atrial fibrillation. Does not require rate control today and I am extremely hesitant to anticoagulate the patient given hematuria visualized today by both his wife and myself during the exam.  Will order renal ultrasound. Spoke with cardiologist given patient's chest discomfort and multiple risk factors who will see the patient while he is an inpatient. VQ scan ordered to rule out PE. Initial dose of Lasix to help with some mild diuresis while here in the emergency department.     ED Course:   Initial assessment performed. The patients presenting problems have been discussed, and they are in agreement with the care plan formulated and outlined with them. I have encouraged them to ask questions as they arise throughout their visit. ED Course as of Dec 01 1511   Melani Gone Dec 01, 2019   [id number] It was determined that [facility] is on today for unassigned patients. He will come see the patient as part of an admission. [TL]      ED Course User Index  [TL] Judie Aiken MD         Disposition:  Admit    Admit Note:  3:10 PM  Pt is being admitted by Dr. Taiwo Hubbard. The results of their tests and reason(s) for their admission have been discussed with pt and/or available family. They convey agreement and understanding for the need to be admitted and for admission diagnosis. PLAN:  1. There are no discharge medications for this patient. 2.   Follow-up Information    None       Return to ED if worse     Diagnosis     Clinical Impression:   1. Acute chest pain    2. Atrial fibrillation, unspecified type ([address]    3. Hematuria, unspecified type    4. History of coronary artery disease    5. History of renal cell carcinoma        Attestations:    Fatuma Chávez MD    Please note that this dictation was completed with [employer], the computer voice recognition software. Quite often unanticipated grammatical, syntax, homophones, and other interpretive errors are inadvertently transcribed by the computer software. Please disregard these errors. Please excuse any errors that have escaped final proofreading. Thank you.

## 2019-12-02 ENCOUNTER — APPOINTMENT (OUTPATIENT)
Dept: NUCLEAR MEDICINE | Age: 74
DRG: 309 | End: 2019-12-02
Attending: EMERGENCY MEDICINE
Payer: MEDICARE

## 2019-12-02 ENCOUNTER — APPOINTMENT (OUTPATIENT)
Dept: NON INVASIVE DIAGNOSTICS | Age: 74
DRG: 309 | End: 2019-12-02
Attending: INTERNAL MEDICINE
Payer: MEDICARE

## 2019-12-02 LAB
ANION GAP SERPL CALC-SCNC: 12 MMOL/L (ref 5–15)
ATRIAL RATE: 119 BPM
ATRIAL RATE: 258 BPM
BUN SERPL-MCNC: 45 MG/DL (ref 6–20)
BUN/CREAT SERPL: 28 (ref 12–20)
CALCIUM SERPL-MCNC: 8.7 MG/DL (ref 8.5–10.1)
CALCULATED R AXIS, ECG10: -34 DEGREES
CALCULATED R AXIS, ECG10: -37 DEGREES
CALCULATED T AXIS, ECG11: 40 DEGREES
CALCULATED T AXIS, ECG11: 92 DEGREES
CHLORIDE SERPL-SCNC: 96 MMOL/L (ref 97–108)
CO2 SERPL-SCNC: 26 MMOL/L (ref 21–32)
CREAT SERPL-MCNC: 1.61 MG/DL (ref 0.7–1.3)
DIAGNOSIS, 93000: NORMAL
DIAGNOSIS, 93000: NORMAL
ECHO AO ROOT DIAM: 3.77 CM
ECHO EST RA PRESSURE: 10 MMHG
ECHO LA MAJOR AXIS: 4.81 CM
ECHO LA TO AORTIC ROOT RATIO: 1.28
ECHO LV EDV TEICHHOLZ: 0.87 ML
ECHO LV ESV TEICHHOLZ: 0.64 ML
ECHO LV INTERNAL DIMENSION DIASTOLIC: 5.65 CM (ref 4.2–5.9)
ECHO LV INTERNAL DIMENSION SYSTOLIC: 4.94 CM
ECHO LV IVSD: 1.61 CM (ref 0.6–1)
ECHO LV MASS 2D: 527.5 G (ref 88–224)
ECHO LV MASS INDEX 2D: 197.6 G/M2 (ref 49–115)
ECHO LV POSTERIOR WALL DIASTOLIC: 1.62 CM (ref 0.6–1)
ECHO LV POSTERIOR WALL SYSTOLIC: 1.55 CM
ECHO LVOT DIAM: 1.88 CM
ECHO PULMONARY ARTERY SYSTOLIC PRESSURE (PASP): 45.6 MMHG
ECHO RIGHT VENTRICULAR SYSTOLIC PRESSURE (RVSP): 45.6 MMHG
ECHO TV REGURGITANT MAX VELOCITY: 298.29 CM/S
ECHO TV REGURGITANT PEAK GRADIENT: 35.6 MMHG
GLUCOSE BLD STRIP.AUTO-MCNC: 209 MG/DL (ref 65–100)
GLUCOSE BLD STRIP.AUTO-MCNC: 236 MG/DL (ref 65–100)
GLUCOSE BLD STRIP.AUTO-MCNC: 279 MG/DL (ref 65–100)
GLUCOSE BLD STRIP.AUTO-MCNC: 323 MG/DL (ref 65–100)
GLUCOSE SERPL-MCNC: 239 MG/DL (ref 65–100)
LVFS 2D: 12.64 %
LVSV (TEICH): 14.97 ML
MAGNESIUM SERPL-MCNC: 2.2 MG/DL (ref 1.6–2.4)
POTASSIUM SERPL-SCNC: 3.8 MMOL/L (ref 3.5–5.1)
Q-T INTERVAL, ECG07: 360 MS
Q-T INTERVAL, ECG07: 396 MS
QRS DURATION, ECG06: 86 MS
QRS DURATION, ECG06: 88 MS
QTC CALCULATION (BEZET), ECG08: 388 MS
QTC CALCULATION (BEZET), ECG08: 440 MS
SERVICE CMNT-IMP: ABNORMAL
SODIUM SERPL-SCNC: 134 MMOL/L (ref 136–145)
VENTRICULAR RATE, ECG03: 58 BPM
VENTRICULAR RATE, ECG03: 90 BPM

## 2019-12-02 PROCEDURE — A9558 XE133 XENON 10MCI: HCPCS

## 2019-12-02 PROCEDURE — 80048 BASIC METABOLIC PNL TOTAL CA: CPT

## 2019-12-02 PROCEDURE — 74011250637 HC RX REV CODE- 250/637: Performed by: INTERNAL MEDICINE

## 2019-12-02 PROCEDURE — C8929 TTE W OR WO FOL WCON,DOPPLER: HCPCS

## 2019-12-02 PROCEDURE — 74011636637 HC RX REV CODE- 636/637: Performed by: INTERNAL MEDICINE

## 2019-12-02 PROCEDURE — 74011250636 HC RX REV CODE- 250/636: Performed by: INTERNAL MEDICINE

## 2019-12-02 PROCEDURE — 82962 GLUCOSE BLOOD TEST: CPT

## 2019-12-02 PROCEDURE — 36415 COLL VENOUS BLD VENIPUNCTURE: CPT

## 2019-12-02 PROCEDURE — 93005 ELECTROCARDIOGRAM TRACING: CPT

## 2019-12-02 PROCEDURE — 65660000000 HC RM CCU STEPDOWN

## 2019-12-02 PROCEDURE — 83735 ASSAY OF MAGNESIUM: CPT

## 2019-12-02 RX ORDER — FOLIC ACID 1 MG/1
2 TABLET ORAL DAILY
Status: ON HOLD | COMMUNITY
End: 2019-12-02 | Stop reason: CLARIF

## 2019-12-02 RX ORDER — CYANOCOBALAMIN (VITAMIN B-12) 250 MCG
1000 TABLET ORAL 2 TIMES DAILY
COMMUNITY

## 2019-12-02 RX ORDER — NITROGLYCERIN 0.4 MG/1
0.4 TABLET SUBLINGUAL
COMMUNITY
End: 2020-02-18

## 2019-12-02 RX ORDER — FOLIC ACID 1 MG/1
TABLET ORAL
COMMUNITY

## 2019-12-02 RX ORDER — RANITIDINE 150 MG/1
150 TABLET, FILM COATED ORAL 2 TIMES DAILY
COMMUNITY
End: 2020-02-18

## 2019-12-02 RX ORDER — GLUCOSAMINE SULFATE 1500 MG
2000 POWDER IN PACKET (EA) ORAL DAILY
COMMUNITY

## 2019-12-02 RX ORDER — BISMUTH SUBSALICYLATE 262 MG
1 TABLET,CHEWABLE ORAL DAILY
COMMUNITY
End: 2020-02-18

## 2019-12-02 RX ADMIN — DOCUSATE SODIUM 100 MG: 100 CAPSULE, LIQUID FILLED ORAL at 10:17

## 2019-12-02 RX ADMIN — Medication 10 ML: at 16:11

## 2019-12-02 RX ADMIN — Medication 10 ML: at 06:00

## 2019-12-02 RX ADMIN — Medication 10 ML: at 22:42

## 2019-12-02 RX ADMIN — INSULIN LISPRO 5 UNITS: 100 INJECTION, SOLUTION INTRAVENOUS; SUBCUTANEOUS at 13:27

## 2019-12-02 RX ADMIN — INSULIN LISPRO 25 UNITS: 100 INJECTION, SOLUTION INTRAVENOUS; SUBCUTANEOUS at 10:14

## 2019-12-02 RX ADMIN — PERFLUTREN 2 ML: 6.52 INJECTION, SUSPENSION INTRAVENOUS at 12:03

## 2019-12-02 RX ADMIN — INSULIN LISPRO 7 UNITS: 100 INJECTION, SOLUTION INTRAVENOUS; SUBCUTANEOUS at 10:15

## 2019-12-02 RX ADMIN — MORPHINE SULFATE 1 MG: 2 INJECTION, SOLUTION INTRAMUSCULAR; INTRAVENOUS at 22:35

## 2019-12-02 RX ADMIN — TORSEMIDE 10 MG: 20 TABLET ORAL at 17:30

## 2019-12-02 RX ADMIN — POTASSIUM CHLORIDE 20 MEQ: 750 TABLET, FILM COATED, EXTENDED RELEASE ORAL at 17:30

## 2019-12-02 RX ADMIN — METOPROLOL TARTRATE 100 MG: 50 TABLET, FILM COATED ORAL at 10:17

## 2019-12-02 RX ADMIN — INSULIN LISPRO 25 UNITS: 100 INJECTION, SOLUTION INTRAVENOUS; SUBCUTANEOUS at 16:30

## 2019-12-02 RX ADMIN — FEBUXOSTAT 40 MG: 40 TABLET, FILM COATED ORAL at 10:16

## 2019-12-02 RX ADMIN — ATORVASTATIN CALCIUM 20 MG: 20 TABLET, FILM COATED ORAL at 22:37

## 2019-12-02 RX ADMIN — DOCUSATE SODIUM 100 MG: 100 CAPSULE, LIQUID FILLED ORAL at 17:29

## 2019-12-02 RX ADMIN — INSULIN GLARGINE 50 UNITS: 100 INJECTION, SOLUTION SUBCUTANEOUS at 10:14

## 2019-12-02 RX ADMIN — INSULIN LISPRO 3 UNITS: 100 INJECTION, SOLUTION INTRAVENOUS; SUBCUTANEOUS at 16:30

## 2019-12-02 RX ADMIN — METOPROLOL TARTRATE 100 MG: 50 TABLET, FILM COATED ORAL at 17:29

## 2019-12-02 RX ADMIN — POTASSIUM CHLORIDE 20 MEQ: 750 TABLET, FILM COATED, EXTENDED RELEASE ORAL at 10:17

## 2019-12-02 RX ADMIN — TERAZOSIN HYDROCHLORIDE 10 MG: 5 CAPSULE ORAL at 22:36

## 2019-12-02 RX ADMIN — TORSEMIDE 10 MG: 20 TABLET ORAL at 10:16

## 2019-12-02 RX ADMIN — OXYCODONE HYDROCHLORIDE AND ACETAMINOPHEN 1 TABLET: 5; 325 TABLET ORAL at 19:55

## 2019-12-02 RX ADMIN — MORPHINE SULFATE 1 MG: 2 INJECTION, SOLUTION INTRAMUSCULAR; INTRAVENOUS at 16:11

## 2019-12-02 RX ADMIN — FINASTERIDE 5 MG: 5 TABLET, FILM COATED ORAL at 22:00

## 2019-12-02 RX ADMIN — SENNOSIDES 8.6 MG: 8.6 TABLET, FILM COATED ORAL at 22:37

## 2019-12-02 RX ADMIN — Medication 10 ML: at 14:00

## 2019-12-02 RX ADMIN — ASPIRIN 81 MG: 81 TABLET, COATED ORAL at 10:17

## 2019-12-02 RX ADMIN — INSULIN LISPRO 2 UNITS: 100 INJECTION, SOLUTION INTRAVENOUS; SUBCUTANEOUS at 22:38

## 2019-12-02 NOTE — PROGRESS NOTES
TRANSFER - IN REPORT:    Verbal report received from Angel Guerrero [facility] (name) on [place]  being received from ED (unit) for routine progression of care      Report consisted of patients Situation, Background, Assessment and   Recommendations(SBAR). Information from the following report(s) SBAR, Kardex, ED Summary, Intake/Output, MAR, Recent Results and Cardiac Rhythm afib was reviewed with the receiving nurse. Opportunity for questions and clarification was provided.       Per reporting RN, consult for urology and cardiology were called

## 2019-12-02 NOTE — CONSULTS
Requesting Provider: Baird Hamman - Reason for Consultation: \"gross hematuria\"  Pre-existing [facility] Patient:   No                Patient: Geovanna Liriano MRN: [id number]  SSN: [id number]    YOB: 1945  Age: 75 y.o. Sex: male     Location: [place] [address]       Code Status: Full Code   PCP: Chava Laird MD  - [phone]   Emergency Contact:  Primary Emergency Contact: Jennifer Ball, Parminder Phone: [phone]   Race/Anglican/Language: WHITE OR  / OTHER / Namon Vidal: Payor: Henna Moya / Plan: Alesha Bob / Product Type: Medicare /    Prior Admission Data:         Hospitalized:  Hospital Day: 2 - Admitted 12/1/2019 11:18 AM   POD # * No surgery found *  by * Surgery not found * - Blood Loss: * No surgery found * * Surgery not found *     CONSULTANTS  IP CONSULT TO CARDIOLOGY  IP CONSULT TO UROLOGY   ADMISSION DIAGNOSES    ICD-10-CM ICD-9-CM   1. Acute chest pain R07.9 786.50   2. Atrial fibrillation, unspecified type ([facility] [address] I48.91 427.31   3. Hematuria, unspecified type R31.9 599.70   4. History of coronary artery disease Z86.79 V12.59   5. History of renal cell carcinoma Z85.528 V10.52         Assessment/Plan:       1. Gross hematuria s/p R nephrectomy:  - if continues to experience hematuria, he agrees to follow with his urologist at the [facility] for hematuria workup  - will continue to monitor labs: hgb, renal function  - please rack urine to monitor hematuria until morning    2. Metastatic renal cell carcinoma status post right nephrectomy  - Agree with Hospitalist plan per note below:  - No change in the long-term care plan continuing palliative chemotherapy  - Observe hematuria for now, will be difficult to anticoagulate  - Continue Proscar, Hytrin    Plan reviewed with Dr. Destiny Martinez and will make further adjustments if needed     CC: No chief complaint on file.    HPI: He is a 75 y.o. male with a history of RCC s/p R nephrectomy, coronary disease, CHF, diabetes, hypertension and hypertriglyceridemia that was seen in the ER d/t chest pain that started  morning around 4:30 AM. Urology was consulted d/t gross hematuria. He is normally a [place] patient, currently getting chemotherapy for renal cell carcinoma that was recurrent after a right nephrectomy roughly 5 years ago. Per wife mets to bones and lungs. Per patient he has had radiation for RCC. No davis, voiding independently. Wife reports urine was initially bright red with a clot that is now light pink in color. Patient takes daily 81 mg Aspirin. Hx of taking finasteride and hytrin. UA shows 5-10 RBCs. Cr. 1.61, BUN 45. WBC and Hbg stable. Afebrile. Bladder scan shows 2 ml. States he had cystoscopy one year ago with normal findings. No recent instrumentation. Denies UTI s/s or flank pain. Renal US that shows left kidney is within normal limits, patient is status post right nephrectomy. In the right nephrectomy bed, there  is a 4.5 cm nonspecific hypoechoic mass, and the urinary bladder is unremarkable. CT abd/pelvis w/o shows: No acute findings on unenhanced exam which may not be sensitive for  dissection. Incidentals as above including multiple sclerotic osseous metastases  and left pelvic sidewall lymph node suspicious for possible metastases in this  patient with known cancer, possibly prostate. Problem: gross hematuria; Location: bladder; Quality:gross, Severity: mild; Timin day, Context: as above; Better/Worse: ASA, Associated s/s:as above     Temp (24hrs), Av.2 °F (36.8 °C), Min:98.1 °F (36.7 °C), Max:98.2 °F (36.8 °C)    Urinary Status: Voiding  Creatinine   Date/Time Value Ref Range Status   2019 03:38 AM 1.61 (H) 0.70 - 1.30 MG/DL Final   2019 11:31 AM 1.63 (H) 0.70 - 1.30 MG/DL Final     Current Antimicrobial Therapy (168h ago, onward)    None        Key Anti-Platelet Anticoagulant Meds             aspirin delayed-release 81 mg tablet (Taking) Take 81 mg by mouth daily. Diet: DIET DIABETIC CONSISTENT CARB Regular; 2 GM NA (House Low NA); AHA-LOW-CHOL FAT -       Labs     Lab Results   Component Value Date/Time    WBC 10.6 12/01/2019 11:31 AM    HCT 45.2 12/01/2019 11:31 AM    PLATELET 754 16/52/4761 11:31 AM    Sodium 134 (L) 12/02/2019 03:38 AM    Potassium 3.8 12/02/2019 03:38 AM    Chloride 96 (L) 12/02/2019 03:38 AM    CO2 26 12/02/2019 03:38 AM    BUN 45 (H) 12/02/2019 03:38 AM    Creatinine 1.61 (H) 12/02/2019 03:38 AM    Glucose 239 (H) 12/02/2019 03:38 AM    Calcium 8.7 12/02/2019 03:38 AM    Magnesium 2.2 12/02/2019 03:38 AM     UA:   Lab Results   Component Value Date/Time    Color YELLOW/STRAW 12/01/2019 12:21 PM    Appearance CLEAR 12/01/2019 12:21 PM    Specific gravity 1.014 12/01/2019 12:21 PM    pH (UA) 6.5 12/01/2019 12:21 PM    Protein 30 (A) 12/01/2019 12:21 PM    Glucose NEGATIVE  12/01/2019 12:21 PM    Ketone NEGATIVE  12/01/2019 12:21 PM    Bilirubin NEGATIVE  12/01/2019 12:21 PM    Urobilinogen 1.0 12/01/2019 12:21 PM    Nitrites NEGATIVE  12/01/2019 12:21 PM    Leukocyte Esterase NEGATIVE  12/01/2019 12:21 PM    Epithelial cells FEW 12/01/2019 12:21 PM    Bacteria NEGATIVE  12/01/2019 12:21 PM    WBC 0-4 12/01/2019 12:21 PM    RBC 5-10 12/01/2019 12:21 PM     Imaging     Results for orders placed during the hospital encounter of 12/01/19   CT ABD PELV WO CONT    Addendum Addendum: CORRECTION: The lungs are not clear. There are small patchy infiltrates in the upper lobes bilaterally and a focal airspace opacity in  the right lower lobe. There is a 1.1 x 1.2 cm left upper lobe pulmonary nodule  (4, 32). ADDITIONAL IMPRESSION: Patchy airspace infiltrate in the apices and right  lower lobe which may be infectious. 1.1 x 1.2 cm left upper lobe pulmonary  nodule suspicious for metastatic deposit in patient with suspected metastatic  neoplasm.         Riddhi Araiza MD 12/2/2019  9:17 AM          Narrative EXAM: CT CHEST WO CONT, CT ABD PELV WO CONT    INDICATION: chest pain with hematuria; eval for dissection    COMPARISON: Chest x-ray of earlier today and MRI abdomen of 1/28/2014. TECHNIQUE:  5 mm axial images were obtained through the chest, abdomen, and  pelvis. Oral and IV contrast were not administered. Coronal and sagittal  reconstructions were generated. CT dose reduction was achieved through use of a  standardized protocol tailored for this examination and automatic exposure  control for dose modulation. FINDINGS:    THYROID: No nodule. MEDIASTINUM: No mass or lymphadenopathy. SUSIE: No mass or lymphadenopathy. THORACIC AORTA: Atherosclerotic calcification without aneurysm. MAIN PULMONARY ARTERY: Normal in caliber. TRACHEA/BRONCHI: Patent. ESOPHAGUS: No wall thickening or dilatation. HEART: The heart is normal in size without pericardial effusion. There is fatty  metaplasia of the right ventricular wall. Coronary artery calcifications are  noted. PLEURA: No effusion or pneumothorax. LUNGS: No nodule, mass, or airspace disease. LIVER: No mass or biliary dilation. GALLBLADDER: Unremarkable. SPLEEN: Unremarkable. PANCREAS: No mass or ductal dilation. ADRENALS: Unremarkable. KIDNEYS: Marked renal atrophy with tiny right kidney similar to appearance to  prior MRI. Left kidney appears unremarkable with no hydronephrosis. STOMACH: Unremarkable  SMALL BOWEL: No dilatation or wall thickening. COLON: No dilatation or wall thickening. APPENDIX: Unremarkable. PERITONEUM: No ascites or pneumoperitoneum. RETROPERITONEUM: Atherosclerotic calcifications without aneurysm. Left pelvic  sidewall lymph node measures 1.6 x 3.4 cm (2, 114) No other enlarged  lymphadenopathy. REPRODUCTIVE ORGANS: Prostate and seminal vesicles appear unremarkable. URINARY BLADDER: No mass or calculus. BONES: The spine change. Multiple sclerotic osseous metastases within the  vertebral bodies and pelvis.   ADDITIONAL COMMENTS: N/A      Impression IMPRESSION: No acute findings on unenhanced exam which may not be sensitive for  dissection. Incidentals as above including multiple sclerotic osseous metastases  and left pelvic sidewall lymph node suspicious for possible metastases in this  patient with known cancer, possibly prostate. US Results (most recent):  Results from [place] encounter on 12/01/19   US RETROPERITONEUM COMP    Narrative EXAM:  US RETROPERITONEUM COMP    INDICATION:  Gross hematuria, status post right nephrectomy, eval for bladder  renal mass. COMPARISON: None. TECHNIQUE:  Real-time sonography of the kidneys, retroperitoneum and bladder was performed  with multiple static images obtained. FINDINGS:  The left kidney measures 12.3 cm and there is no hydronephrosis. Patient status post right nephrectomy. The nephrectomy bed there is a 4.5 cm  hypoechoic lesion. The aorta is obscured by bowel gas  The proximal iliac arteries are obscured by  bowel gas  The IVC is obscured by bowel gas fraction    The urinary bladder is normal.      Impression IMPRESSION:   1. Left kidney is within normal limits. 2. Patient is status post right nephrectomy. In the right nephrectomy bed, there  is a 4.5 cm nonspecific hypoechoic mass. 3. Urinary bladder is unremarkable.                Cultures     All Micro Results     None           Past History: (Complete 2+/3 areas)     Allergies   Allergen Reactions    Paradione Hives      Current Facility-Administered Medications   Medication Dose Route Frequency    acetaminophen (TYLENOL) tablet 650 mg  650 mg Oral Q6H PRN    aspirin delayed-release tablet 81 mg  81 mg Oral DAILY    atorvastatin (LIPITOR) tablet 20 mg  20 mg Oral QHS    docusate sodium (COLACE) capsule 100 mg  100 mg Oral BID    febuxostat (ULORIC) tablet 40 mg  40 mg Oral DAILY    finasteride (PROSCAR) tablet 5 mg  5 mg Oral QHS    metoprolol tartrate (LOPRESSOR) tablet 100 mg  100 mg Oral BID    oxyCODONE-acetaminophen (PERCOCET) 5-325 mg per tablet 1 Tab  1 Tab Oral Q4H PRN    potassium chloride SR (KLOR-CON 10) tablet 20 mEq  20 mEq Oral BID    senna (SENOKOT) tablet 8.6 mg  1 Tab Oral QHS    terazosin (HYTRIN) capsule 10 mg  10 mg Oral QHS    torsemide (DEMADEX) tablet 10 mg  10 mg Oral BID    sodium chloride (NS) flush 5-40 mL  5-40 mL IntraVENous Q8H    sodium chloride (NS) flush 5-40 mL  5-40 mL IntraVENous PRN    naloxone (NARCAN) injection 0.4 mg  0.4 mg IntraVENous PRN    morphine injection 1 mg  1 mg IntraVENous Q4H PRN    insulin glargine (LANTUS) injection 50 Units  50 Units SubCUTAneous DAILY    insulin lispro (HUMALOG) injection   SubCUTAneous AC&HS    glucose chewable tablet 16 g  4 Tab Oral PRN    dextrose (D50) infusion 12.5-25 g  12.5-25 g IntraVENous PRN    glucagon (GLUCAGEN) injection 1 mg  1 mg IntraMUSCular PRN    insulin lispro (HUMALOG) injection 25 Units  25 Units SubCUTAneous ACB&D     Current Outpatient Medications   Medication Sig    pyridoxine, vitamin B6, (VITAMIN B-6) 50 mg cap Take 1 Cap by mouth daily.  cabozantinib 40 mg tab Take 40 mg by mouth daily.  raNITIdine (ZANTAC) 150 mg tablet Take 150 mg by mouth two (2) times a day.  cyanocobalamin (VITAMIN B12) 250 mcg tablet Take 1,000 mcg by mouth two (2) times a day.  cholecalciferol (VITAMIN D3) 25 mcg (1,000 unit) cap Take 2,000 Units by mouth daily.  multivitamin (ONE A DAY) tablet Take 1 Tab by mouth daily.  nitroglycerin (NITROSTAT) 0.4 mg SL tablet 0.4 mg by SubLINGual route every five (5) minutes as needed for Chest Pain. Up to 3 doses.  folic acid (FOLVITE) 1 mg tablet Take 3 mg by mouth nightly.  folic acid (FOLVITE) 1 mg tablet Take 2 mg by mouth daily.  oxyCODONE-acetaminophen (PERCOCET) 5-325 mg per tablet Take 1 Tab by mouth every six (6) hours as needed for Pain.  chlorthalidone (HYGROTEN) 25 mg tablet Take 25 mg by mouth daily.     torsemide (DEMADEX) 20 mg tablet Take 60 mg by mouth two (2) times a day.  finasteride (PROSCAR) 5 mg tablet Take 5 mg by mouth nightly.  terazosin (HYTRIN) 10 mg capsule Take 10 mg by mouth nightly.  atorvastatin (LIPITOR) 80 mg tablet Take 40 mg by mouth nightly.  aspirin delayed-release 81 mg tablet Take 81 mg by mouth daily.  metoprolol tartrate (LOPRESSOR) 50 mg tablet Take 50 mg by mouth two (2) times a day.  docusate sodium (COLACE) 100 mg capsule Take 100 mg by mouth three (3) times daily as needed.  sennosides (SENNA) 8.6 mg cap Take 1 Cap by mouth nightly.  febuxostat (ULORIC) 40 mg tab tablet Take 80 mg by mouth daily.  potassium chloride SR (KLOR-CON 10) 10 mEq tablet Take 30 mEq by mouth two (2) times a day.  insulin glargine (LANTUS,BASAGLAR) 100 unit/mL (3 mL) inpn 50 Units by SubCUTAneous route daily.  insulin CONCENTRATED regular (HUMULIN R U-500) 500 unit/mL soln 200 Units by SubCUTAneous route Daily (before breakfast).  insulin CONCENTRATED regular (HUMULIN R U-500) 500 unit/mL soln 50 Units by SubCUTAneous route Daily (before dinner). Prior to Admission medications    Medication Sig Start Date End Date Taking? Authorizing Provider   pyridoxine, vitamin B6, (VITAMIN B-6) 50 mg cap Take 1 Cap by mouth daily. Yes Provider, Historical   cabozantinib 40 mg tab Take 40 mg by mouth daily. Yes Provider, Historical   raNITIdine (ZANTAC) 150 mg tablet Take 150 mg by mouth two (2) times a day. Yes Provider, Historical   cyanocobalamin (VITAMIN B12) 250 mcg tablet Take 1,000 mcg by mouth two (2) times a day. Yes Provider, Historical   cholecalciferol (VITAMIN D3) 25 mcg (1,000 unit) cap Take 2,000 Units by mouth daily. Yes Provider, Historical   multivitamin (ONE A DAY) tablet Take 1 Tab by mouth daily. Yes Provider, Historical   nitroglycerin (NITROSTAT) 0.4 mg SL tablet 0.4 mg by SubLINGual route every five (5) minutes as needed for Chest Pain. Up to 3 doses.    Yes Provider, Historical   folic acid (FOLVITE) 1 mg tablet Take 3 mg by mouth nightly. Yes Provider, Historical   folic acid (FOLVITE) 1 mg tablet Take 2 mg by mouth daily. Yes Provider, Historical   oxyCODONE-acetaminophen (PERCOCET) 5-325 mg per tablet Take 1 Tab by mouth every six (6) hours as needed for Pain. Yes Provider, Historical   chlorthalidone (HYGROTEN) 25 mg tablet Take 25 mg by mouth daily. Yes Provider, Historical   torsemide (DEMADEX) 20 mg tablet Take 60 mg by mouth two (2) times a day. Yes Provider, Historical   finasteride (PROSCAR) 5 mg tablet Take 5 mg by mouth nightly. Yes Provider, Historical   terazosin (HYTRIN) 10 mg capsule Take 10 mg by mouth nightly. Yes Provider, Historical   atorvastatin (LIPITOR) 80 mg tablet Take 40 mg by mouth nightly. Yes Provider, Historical   aspirin delayed-release 81 mg tablet Take 81 mg by mouth daily. Yes Provider, Historical   metoprolol tartrate (LOPRESSOR) 50 mg tablet Take 50 mg by mouth two (2) times a day. Yes Provider, Historical   docusate sodium (COLACE) 100 mg capsule Take 100 mg by mouth three (3) times daily as needed. Yes Provider, Historical   sennosides (SENNA) 8.6 mg cap Take 1 Cap by mouth nightly. Yes Provider, Historical   febuxostat (ULORIC) 40 mg tab tablet Take 80 mg by mouth daily. Yes Provider, Historical   potassium chloride SR (KLOR-CON 10) 10 mEq tablet Take 30 mEq by mouth two (2) times a day. Yes Provider, Historical   insulin glargine (LANTUS,BASAGLAR) 100 unit/mL (3 mL) inpn 50 Units by SubCUTAneous route daily. Yes Provider, Historical   insulin CONCENTRATED regular (HUMULIN R U-500) 500 unit/mL soln 200 Units by SubCUTAneous route Daily (before breakfast). Yes Provider, Historical   insulin CONCENTRATED regular (HUMULIN R U-500) 500 unit/mL soln 50 Units by SubCUTAneous route Daily (before dinner). Yes Provider, Historical        PMHx:  has a past medical history of Cancer ([facility] [address] Diabetes ([facility] [address] and Hypertension.    PSurgHx:  has a past surgical history that includes hx orthopaedic and hx nephrostomy. PSocHx:  reports that he has never smoked. He has never used smokeless tobacco. He reports that he does not drink alcohol or use drugs.    ROS:  (Complete - 10 systems) - DENIES: Weightloss (Constitutional), Dry mouth (ENMT), Chest pain (CV), SOB (Respiratory), Constipation (GI), Weakness (MS), Pallor (Skin), TIA Sx (Neuro), Confusion (Psych), Easy bruising (Heme)    Physical Exam: (Comprehesive - 8+ 1995 Systems)     (1) Constitutional:  FIO2:   on SpO2: O2 Sat (%): 98 %  O2 Device: Nasal cannula O2 Flow Rate (L/min): 2 l/min  Patient Vitals for the past 24 hrs:   BP Temp Pulse Resp SpO2 Height Weight   12/02/19 1110 146/52     5' 11\" (1.803 m) 157.9 kg (348 lb 1.7 oz)   12/02/19 0800 146/52  78 14 98 %     12/02/19 0548 123/54 98.1 °F (36.7 °C) 84 18 95 %     12/02/19 0121 126/44 98.2 °F (36.8 °C) 72 18 96 %     12/01/19 2200 112/82  75 18 96 %     12/01/19 2145 (!) 136/96  75 24 95 %     12/01/19 2130 124/86  69 15 95 %     12/01/19 2115 141/76  71 16 96 %     12/01/19 2100 135/81  72 15 97 %     12/01/19 2045 138/77  69 16 95 %     12/01/19 2030 109/73  67 15 96 %     12/01/19 2020 119/58  70 13 94 %     12/01/19 2001 159/74  69 12 90 %     12/01/19 1945 122/68  72 18 94 %     12/01/19 1932 136/78  73 16 (!) 89 %     12/01/19 1915 (!) 154/95  67 14 93 %     12/01/19 1901 137/60  67 18 92 %     12/01/19 1839   83 17      12/01/19 1830 115/84  97 15 96 %     12/01/19 1815 116/81  94 15 93 %     12/01/19 1809 139/77  97       12/01/19 1800 139/77  85 13 92 %     12/01/19 1745 (!) 133/91  84 14 92 %     12/01/19 1730 165/67  84 12 92 %     12/01/19 1715 131/75  85 13 92 %     12/01/19 1700 163/67  81 12 91 %     12/01/19 1645 145/74  82 12      12/01/19 1630 162/77  81 12 92 %     12/01/19 1615 (!) 162/91  97 14      12/01/19 1600 151/82  87 14 92 %   12/01/19 1545 152/71  88 17 93 %     12/01/19 1530 136/62  81 15 94 %     12/01/19 1515   86 13 92 %     12/01/19 1500   87 18 93 %     12/01/19 1430   83 14      12/01/19 1415   78 14          Date 12/01/19 0700 - 12/02/19 0659 12/02/19 0700 - 12/03/19 0659   Shift 3744-1206 2971-0816 24 Hour Total 9087-5005 6351-3988 24 Hour Total   INTAKE   Shift Total(mL/kg)         OUTPUT   Urine(mL/kg/hr) 150(0.1) 300(0.2) 450(0.1)        Urine Voided 150 300 450        Urine Occurrence(s)    1 x  1 x   Shift Total(mL/kg) 150(1) 300(1.9) 450(2.9)      NET -150 -300 -450      Weight (kg) 157.8 157.8 157.8 157.9 157.9 157.9      (2) ENMT:   moist mucous membranes, normal sinuses, hard of hearing   (3) Respiratory:  breathing easily, no distress   (4) GI:  no abdominal masses or tenderness, obese   (5) :   Normal, gross hematuria   (6) Lymphatic:  no adenopathy, neck supple   (7) Muscloskeletal:  no gross deformity, normal ROM   (8) Skin:  no rash, warm & dry   (9) Neuro:  no focal deficits, normal speech      Signed By: Lesley Sandoval NP  - December 2, 2019

## 2019-12-02 NOTE — PROGRESS NOTES
Wife just got to hospital, stating that she has talked to the [address] and that they are requesting him to be transferred. MD paged and made aware.

## 2019-12-02 NOTE — PROGRESS NOTES
Physical THerapy    Spoke with pt and wife re: PT order. Pt with CP on admission, hx stent, is on chemo for renal cell CA with mets to bone. Pt and wife state pt remained mainly in the bed at home but was able to transfer himself from bed to w/c and from w/c to scooter for appts. He states he was doing this mostly up until admitting issues. Wife gives pt a bed bath and assists with all ADLs. Pt has been non-ambulatory for a long period of time per wife. They have canes, walkers, a BSC, w/c and scooter. Pt declines eval of transfers at this time. Will check back tomorrow however if pt is at his baseline, likely no skilled PT needs. Pt is also awaiting transfer to [facility] for continuation of care but unsure at this time when this will take place.     Gail Day, PT

## 2019-12-02 NOTE — PROGRESS NOTES
Physical Therapy    Received PT eval order. Pt currently receiving echo. Will follow as pt is available and able to cooperate.     Marsha Villagran, PT

## 2019-12-02 NOTE — PROGRESS NOTES
Progress Note      12/2/2019 4:33 PM  NAME: Zarina Escobar   MRN:  [id number]   Admit Diagnosis: New onset a-fib ([facility].) [I48.91]; Hematuria [R31.9]; Renal cell carcinoma (HCC) [C64.9]     Assessment:       Atrial Fibrillation of unknown duration   Chest pain   Hx of CAD    Unknown LV function. Remote PCI  at [facility]. center years ago. Renal cell CA , s/p R nephrectomy , now with metastatic disease. Diabetes type 2   Severe Diabetic neupropathy. He is pretty much bed bound, uses a beside commode. Unable to walk with a walker. Hx of CHF   HTN   Hyperlipidemia. Gross hematuria present today . Morbid Obesity . CKD 3       Echo showed normal LV fx and no significant valve disease. Normal size LA / RA         Plan:     Continue meds. Plans for transfer to the [place] noted. [x]        High complexity decision making was performed    Subjective: Zarina Escobar denies chest pain, dyspnea. Discussed with RN events overnight. Patient Active Problem List   Diagnosis Code    Renal cell carcinoma (HCC) C64.9    New onset a-fib ([facility] I48.91    Hematuria R31.9       Review of Systems:    Symptom Y/N Comments  Symptom Y/N Comments   Fever/Chills N   Chest Pain N    Poor Appetite N   Edema N    Cough N   Abdominal Pain N    Sputum N   Joint Pain N    SOB/LOMBARDI N   Pruritis/Rash N    Nausea/vomit N   Tolerating PT/OT Y    Diarrhea N   Tolerating Diet Y    Constipation N   Other       Could NOT obtain due to:      Objective:      Physical Exam:    Last 24hrs VS reviewed since prior progress note.  Most recent are:    Visit Vitals  /62   Pulse (!) 59   Temp 98 °F (36.7 °C)   Resp 20   Ht 5' 11\" (1.803 m)   Wt 157.9 kg (348 lb 1.7 oz)   SpO2 98%   BMI 48.55 kg/m²       Intake/Output Summary (Last 24 hours) at 12/2/2019 1633  Last data filed at 12/2/2019 1300  Gross per 24 hour   Intake 370 ml   Output 300 ml   Net 70 ml        General Appearance: Well developed, well nourished, alert & oriented x 3,    no acute distress. Ears/Nose/Mouth/Throat: Hearing grossly normal.  Neck: Supple. Chest: Lungs clear to auscultation bilaterally. Cardiovascular: Regular rate and rhythm, S1S2 normal, no murmur. Abdomen: Soft, non-tender, bowel sounds are active. Extremities: No edema bilaterally. Skin: Warm and dry. PMH/SH reviewed - no change compared to H&P    Data Review    Telemetry: normal sinus rhythm     Lab Data Personally Reviewed:    Recent Labs     12/01/19  1131   WBC 10.6   HGB 14.5   HCT 45.2      LABRCNT(INR:3,PTP:3,APTT:3,)  Recent Labs     12/02/19  0338 12/01/19  1131   * 138   K 3.8 3.3*   CL 96* 99   CO2 26 27   BUN 45* 40*   CREA 1.61* 1.63*   * 144*   CA 8.7 9.3   MG 2.2  --    LABRCNT(CPK:3,CpKMB:3,ckndx:3,troiq:3)No results found for: CHOL, CHOLX, CHLST, CHOLV, HDL, HDLP, LDL, LDLC, DLDLP, TGLX, TRIGL, TRIGP, CHHD, CHHDXLABRCNT(sgot:3,gpt:3,ap:3,tbiL:3,TP:3,ALB:3,GLOB:3,ggt:3,aml:3,amyp:3,lpse:3,hlpse:3)No results for input(s): PH, PCO2, PO2 in the last 72 hours. No results found for: CHOL, CHOLX, CHLST, CHOLV, HDL, HDLP, LDL, LDLC, DLDLP, TGLX, TRIGL, TRIGP, CHHD, CHHDXMEDTABLETimzenia Linder MD  No results for input(s): PH, PCO2, PO2 in the last 72 hours.     Medications Personally Reviewed:    Current Facility-Administered Medications   Medication Dose Route Frequency    acetaminophen (TYLENOL) tablet 650 mg  650 mg Oral Q6H PRN    aspirin delayed-release tablet 81 mg  81 mg Oral DAILY    atorvastatin (LIPITOR) tablet 20 mg  20 mg Oral QHS    docusate sodium (COLACE) capsule 100 mg  100 mg Oral BID    febuxostat (ULORIC) tablet 40 mg  40 mg Oral DAILY    finasteride (PROSCAR) tablet 5 mg  5 mg Oral QHS    metoprolol tartrate (LOPRESSOR) tablet 100 mg  100 mg Oral BID    oxyCODONE-acetaminophen (PERCOCET) 5-325 mg per tablet 1 Tab  1 Tab Oral Q4H PRN    potassium chloride SR (KLOR-CON 10) tablet 20 mEq  20 mEq Oral BID    senna (SENOKOT) tablet 8.6 mg  1 Tab Oral QHS    terazosin (HYTRIN) capsule 10 mg  10 mg Oral QHS    torsemide (DEMADEX) tablet 10 mg  10 mg Oral BID    sodium chloride (NS) flush 5-40 mL  5-40 mL IntraVENous Q8H    sodium chloride (NS) flush 5-40 mL  5-40 mL IntraVENous PRN    naloxone (NARCAN) injection 0.4 mg  0.4 mg IntraVENous PRN    morphine injection 1 mg  1 mg IntraVENous Q4H PRN    insulin glargine (LANTUS) injection 50 Units  50 Units SubCUTAneous DAILY    insulin lispro (HUMALOG) injection   SubCUTAneous AC&HS    glucose chewable tablet 16 g  4 Tab Oral PRN    dextrose (D50) infusion 12.5-25 g  12.5-25 g IntraVENous PRN    glucagon (GLUCAGEN) injection 1 mg  1 mg IntraMUSCular PRN    insulin lispro (HUMALOG) injection 25 Units  25 Units SubCUTAneous ACB&D         Shanell Medellin MD

## 2019-12-02 NOTE — PROGRESS NOTES
1515  Patient arrived on unit. On 2L NC      Patient takes chemo medication. Nurse has it locked in patient bin for labeling and dosing by pharmacy if MD restarts.

## 2019-12-02 NOTE — PROGRESS NOTES
Hospitalist Progress Note    NAME: Santo Juarez   :  1945   MRN:  [id number]       Assessment / Plan:  patient wanted to get transferred [facility].  I called the transfer center nurse. And gave accepting physician/nurse practitioner Belinda Beach. The transfer center nurse called me back to tell me that [facility] is full and on diversion and they are not accepting patient today and advised me to try tomorrow. New onset A. Fib POA  chest pain POA  History of CAD status post stent   History of CHF ? systolic , echocardiogram pending. Patient usually goes to [facility].  But the ambulance brought him here this time. Seen by cardiology already. Cardiology preferred medical management of possible acute coronary syndrome. But patient had gross hematuria yesterday this is a patient with a renal cell carcinoma status post right nephrectomy in the past.  Very difficult scenario to anticoagulate the patient. Otherwise we will continue to give Lipitor, baby aspirin, metoprolol 100 mg twice a day, torsemide. Cardiac enzymes negative ,EKG showed A. Fib. Gross hematuria. Metastatic renal cell carcinoma status post right nephrectomy-mets to bones, lungs as per wife, currently on chemotherapy per urology at [place]  Urology consult placed please continue to follow. Continue Proscar, Hytrin  Diabetes type 2 insulin-dependent. Continue home regimen of Lantus 50 units daily in the morning  SSI lispro here  Fingersticks q. before meals and at bedtime  JORGE vs CKD , unable to determine because we do not have records in the past.  We will continue to avoid nephrotoxic substances. Patient on torsemide.   Morbid obesity POA  Weight loss would be challenging due to nonambulatory status at baseline  Code Status:  full code as per patient's wishes in the ER  Surrogate Decision Maker: Wife Linda #[phone]  DVT Prophylaxis: SCDs  GI Prophylaxis: not indicated  Baseline: Patient lives with his wife at home pretty much nonambulatory at baseline, uses electric scooter to move around to get to his appointments , otherwise uses walker at home as per wife  Body mass index is 48.54 kg/m².: 40 or above Morbid obesity  Recommended Disposition: TBD     Subjective:     Chief Complaint / Reason for Physician Visit  \" Patient said he is not having chest pain anymore. He feels a little short of breath. \".  Discussed with RN events overnight. Review of Systems:  Symptom Y/N Comments  Symptom Y/N Comments   Fever/Chills n   Chest Pain n    Poor Appetite    Edema     Cough n   Abdominal Pain n    Sputum    Joint Pain     SOB/LOMBARDI y   Pruritis/Rash     Nausea/vomit    Tolerating PT/OT     Diarrhea    Tolerating Diet     Constipation    Other       Could NOT obtain due to:      Objective:     VITALS:   Last 24hrs VS reviewed since prior progress note.  Most recent are:  Patient Vitals for the past 24 hrs:   Temp Pulse Resp BP SpO2   12/02/19 0800  78 14 146/52 98 %   12/02/19 0548 98.1 °F (36.7 °C) 84 18 123/54 95 %   12/02/19 0121 98.2 °F (36.8 °C) 72 18 126/44 96 %   12/01/19 2200  75 18 112/82 96 %   12/01/19 2145  75 24 (!) 136/96 95 %   12/01/19 2130  69 15 124/86 95 %   12/01/19 2115  71 16 141/76 96 %   12/01/19 2100  72 15 135/81 97 %   12/01/19 2045  69 16 138/77 95 %   12/01/19 2030  67 15 109/73 96 %   12/01/19 2020  70 13 119/58 94 %   12/01/19 2001  69 12 159/74 90 %   12/01/19 1945  72 18 122/68 94 %   12/01/19 1932  73 16 136/78 (!) 89 %   12/01/19 1915  67 14 (!) 154/95 93 %   12/01/19 1901  67 18 137/60 92 %   12/01/19 1839  83 17     12/01/19 1830  97 15 115/84 96 %   12/01/19 1815  94 15 116/81 93 %   12/01/19 1809  97  139/77    12/01/19 1800  85 13 139/77 92 %   12/01/19 1745  84 14 (!) 133/91 92 %   12/01/19 1730  84 12 165/67 92 %   12/01/19 1715  85 13 131/75 92 %   12/01/19 1700  81 12 163/67 91 %   12/01/19 1645  82 12 145/74    12/01/19 1630  81 12 162/77 92 %   12/01/19 1615  97 14 (!) 162/91    12/01/19 1600  87 14 151/82 92 %   12/01/19 1545  88 17 152/71 93 %   12/01/19 1530  81 15 136/62 94 %   12/01/19 1515  86 13  92 %   12/01/19 1500  87 18  93 %   12/01/19 1430  83 14     12/01/19 1415  78 14     12/01/19 1400  77 14  95 %   12/01/19 1345  88 11 140/90 95 %   12/01/19 1330 98.2 °F (36.8 °C) 94 18 122/90 95 %   12/01/19 1315  84 18 (!) 145/113 95 %   12/01/19 1245  76 13 145/79 95 %   12/01/19 1230  83 16 (!) 139/99 94 %   12/01/19 1215  80 13  94 %   12/01/19 1200  85 17 90/69 95 %   12/01/19 1145  83 18 127/61 95 %   12/01/19 1130  88 18 170/62    12/01/19 1128 99.2 °F (37.3 °C) 81 18 158/83 96 %       Intake/Output Summary (Last 24 hours) at 12/2/2019 0820  Last data filed at 12/2/2019 0504  Gross per 24 hour   Intake    Output 450 ml   Net -450 ml        PHYSICAL EXAM:  General: WD, WN. Alert, cooperative, no acute distress  , morbidly obese patient. EENT:  EOMI. Anicteric sclerae. MMM  Resp:  Difficult to appreciate breath sounds because of body habitus. Decrease in the lower lungs. No wheezing. CV:  Irregularly irregular normal rate,  No edema  GI:  Soft, Non distended, Non tender.  +Bowel sounds  Neurologic:  Alert and oriented X 3, normal speech,   No lower extremity pitting edema noted. Reviewed most current lab test results and cultures  YES  Reviewed most current radiology test results   YES  Review and summation of old records today    NO  Reviewed patient's current orders and MAR    YES  PMH/SH reviewed - no change compared to H&P  ________________________________________________________________________  Care Plan discussed with:    Comments   Patient y    Family  y    RN y    Care Manager     Consultant                        Multidiciplinary team rounds were held today with , nursing, pharmacist and clinical coordinator.   Patient's plan of care was discussed; medications were reviewed and discharge planning was addressed. ________________________________________________________________________  Total NON critical care TIME: 35  Minutes    Total CRITICAL CARE TIME Spent:   Minutes non procedure based      Comments   >50% of visit spent in counseling and coordination of care     ________________________________________________________________________  Fatimah Esquivel MD     Procedures: see electronic medical records for all procedures/Xrays and details which were not copied into this note but were reviewed prior to creation of Plan. LABS:  I reviewed today's most current labs and imaging studies. Pertinent labs include:  Recent Labs     12/01/19  1131   WBC 10.6   HGB 14.5   HCT 45.2        Recent Labs     12/02/19  0338 12/01/19  1131   * 138   K 3.8 3.3*   CL 96* 99   CO2 26 27   * 144*   BUN 45* 40*   CREA 1.61* 1.63*   CA 8.7 9.3   MG 2.2  --    ALB  --  3.2*   TBILI  --  0.7   SGOT  --  53*   ALT  --  57       Signed:  Fatimah Esquivel MD

## 2019-12-02 NOTE — PROGRESS NOTES
TRANSFER - OUT REPORT:    Verbal report given to TARIK Wagoner (name) on Standard Hardin  being transferred to [facility](unit) for routine progression of care       Report consisted of patients Situation, Background, Assessment and   Recommendations(SBAR). Information from the following report(s) SBAR, Kardex and ED Summary was reviewed with the receiving nurse. Lines:   Peripheral IV 12/01/19 Left Hand (Active)   Site Assessment Clean, dry, & intact 12/2/2019 12:00 PM   Phlebitis Assessment 0 12/2/2019 12:00 PM   Infiltration Assessment 0 12/2/2019 12:00 PM   Dressing Status Clean, dry, & intact 12/2/2019 12:00 PM   Dressing Type Transparent 12/2/2019 12:00 PM   Hub Color/Line Status Infusing 12/2/2019 12:00 PM   Action Taken Open ports on tubing capped 12/2/2019 12:00 PM   Alcohol Cap Used Yes 12/2/2019 12:00 PM        Opportunity for questions and clarification was provided. Patient transported with:   code black transport staff.

## 2019-12-02 NOTE — PROGRESS NOTES
Pharmacy Clarification of the Prior to Admission Medication Regimen Retrospective to the Admission Medication Reconciliation    The patient was not interviewed regarding clarification of the prior to admission medication regimen. Patient's mother was present in room and obtained permission from patient to discuss drug regimen with visitor(s) present. Patient's mother was questioned regarding use of any other inhalers, topical products, over the counter medications, herbal medications, vitamin products or ophthalmic/nasal/otic medication use. Information Obtained From: RX Query, Patient's mother, RX Bottles    Recommendations/Findings:    The following amendments were made to the patient's active medication list on file at [facility]:     1) Additions:   cyanocobalamin (VITAMIN B12) 250 mcg tablet  cabozantinib 40 mg tab  folic acid (FOLVITE) 1 mg tablet  pyridoxine, vitamin B6, (VITAMIN B-6) 50 mg cap  raNITIdine (ZANTAC) 150 mg tablet  cholecalciferol (VITAMIN D3) 25 mcg (1,000 unit) cap  multivitamin (ONE A DAY) tablet  nitroglycerin (NITROSTAT) 0.4 mg SL tablet    2) Removals: None    3) Changes:  atorvastatin (LIPITOR) tablet (Old regimen: (strength 10 mg) nightly /New regimen: (strength 80 mg) 40 mg nightly)  docusate sodium (COLACE) 100 mg capsule (Old regimen: 100 mg BID /New regimen: 100 mg TID)  febuxostat (ULORIC) 40 mg tab tablet (Old regimen: 40 mg daily /New regimen: 80 mg daily)  metoprolol tartrate (LOPRESSOR) 50 mg tablet (Old regimen: BID /New regimen: 50 mg BID)  oxyCODONE-acetaminophen (PERCOCET) 5-325 mg per tablet (Old regimen: 1 tab Q4H PRN  /New regimen: 1 tab Q6H PRN)  potassium chloride SR (KLOR-CON 10) 10 mEq tablet (Old regimen: BID /New regimen: 3 tabs BID)  sennosides (SENNA) 8.6 mg cap (Old regimen: no sig /New regimen: 1 cap QHS)    4) Pertinent Pharmacy Findings:  Updated patients preferred outpatient pharmacy to: [address] [place] [address] medication list was corrected to the following:     Prior to Admission Medications   Prescriptions Last Dose Informant Taking?   aspirin delayed-release 81 mg tablet 2019 at Unknown time Parent Yes   Sig: Take 81 mg by mouth daily. atorvastatin (LIPITOR) 80 mg tablet 2019 at Unknown time Parent Yes   Sig: Take 40 mg by mouth nightly. cabozantinib 40 mg tab 2019 at Unknown time Parent Yes   Sig: Take 40 mg by mouth daily. chlorthalidone (HYGROTEN) 25 mg tablet 2019 at Unknown time Parent Yes   Sig: Take 25 mg by mouth daily. cholecalciferol (VITAMIN D3) 25 mcg (1,000 unit) cap 2019 at Unknown time Parent Yes   Sig: Take 2,000 Units by mouth daily. cyanocobalamin (VITAMIN B12) 250 mcg tablet 2019 at Unknown time Parent Yes   Sig: Take 1,000 mcg by mouth two (2) times a day. docusate sodium (COLACE) 100 mg capsule 2019 at Unknown time Parent Yes   Sig: Take 100 mg by mouth three (3) times daily as needed. febuxostat (ULORIC) 40 mg tab tablet 2019 at Unknown time Parent Yes   Sig: Take 80 mg by mouth daily. finasteride (PROSCAR) 5 mg tablet 2019 at Unknown time Parent Yes   Sig: Take 5 mg by mouth nightly. folic acid (FOLVITE) 1 mg tablet 2019 at Unknown time Parent Yes   Sig: Take 3 mg by mouth nightly. folic acid (FOLVITE) 1 mg tablet 2019 at Unknown time Parent Yes   Sig: Take 2 mg by mouth daily. insulin CONCENTRATED regular (HUMULIN R U-500) 500 unit/mL soln 2019 at Unknown time Parent Yes   Si Units by SubCUTAneous route Daily (before breakfast). insulin CONCENTRATED regular (HUMULIN R U-500) 500 unit/mL soln 2019 at Unknown time Parent Yes   Si Units by SubCUTAneous route Daily (before dinner). insulin glargine (LANTUS,BASAGLAR) 100 unit/mL (3 mL) inpn 2019 at Unknown time Parent Yes   Si Units by SubCUTAneous route daily.    metoprolol tartrate (LOPRESSOR) 50 mg tablet 2019 at Unknown time Parent Yes   Sig: Take 50 mg by mouth two (2) times a day. multivitamin (ONE A DAY) tablet 2019 at Unknown time Parent Yes   Sig: Take 1 Tab by mouth daily. nitroglycerin (NITROSTAT) 0.4 mg SL tablet 2019 at Unknown time Parent Yes   Si.4 mg by SubLINGual route every five (5) minutes as needed for Chest Pain. Up to 3 doses. oxyCODONE-acetaminophen (PERCOCET) 5-325 mg per tablet 2019 at Unknown time Parent Yes   Sig: Take 1 Tab by mouth every six (6) hours as needed for Pain.   potassium chloride SR (KLOR-CON 10) 10 mEq tablet 2019 at Unknown time Parent Yes   Sig: Take 30 mEq by mouth two (2) times a day. pyridoxine, vitamin B6, (VITAMIN B-6) 50 mg cap 2019 at Unknown time Parent Yes   Sig: Take 1 Cap by mouth daily. raNITIdine (ZANTAC) 150 mg tablet 2019 at Unknown time Parent Yes   Sig: Take 150 mg by mouth two (2) times a day. sennosides (SENNA) 8.6 mg cap 2019 at Unknown time Parent Yes   Sig: Take 1 Cap by mouth nightly. terazosin (HYTRIN) 10 mg capsule 2019 at Unknown time Parent Yes   Sig: Take 10 mg by mouth nightly. torsemide (DEMADEX) 20 mg tablet 2019 at Unknown time Parent Yes   Sig: Take 60 mg by mouth two (2) times a day.       Facility-Administered Medications: None          Thank you,  Elis Quiros  Medication History Pharmacy Technician

## 2019-12-02 NOTE — ED NOTES
Report given to [facility]. Plan of care discussed and all questions answered.

## 2019-12-03 LAB
ALBUMIN SERPL-MCNC: 2.7 G/DL (ref 3.5–5)
ALBUMIN/GLOB SERPL: 0.7 {RATIO} (ref 1.1–2.2)
ALP SERPL-CCNC: 520 U/L (ref 45–117)
ALT SERPL-CCNC: 54 U/L (ref 12–78)
ANION GAP SERPL CALC-SCNC: 9 MMOL/L (ref 5–15)
AST SERPL-CCNC: 61 U/L (ref 15–37)
BASOPHILS # BLD: 0 K/UL (ref 0–0.1)
BASOPHILS NFR BLD: 0 % (ref 0–1)
BILIRUB SERPL-MCNC: 1.1 MG/DL (ref 0.2–1)
BUN SERPL-MCNC: 50 MG/DL (ref 6–20)
BUN/CREAT SERPL: 31 (ref 12–20)
CALCIUM SERPL-MCNC: 8.5 MG/DL (ref 8.5–10.1)
CHLORIDE SERPL-SCNC: 95 MMOL/L (ref 97–108)
CO2 SERPL-SCNC: 27 MMOL/L (ref 21–32)
CREAT SERPL-MCNC: 1.62 MG/DL (ref 0.7–1.3)
DIFFERENTIAL METHOD BLD: ABNORMAL
EOSINOPHIL # BLD: 0.2 K/UL (ref 0–0.4)
EOSINOPHIL NFR BLD: 2 % (ref 0–7)
ERYTHROCYTE [DISTWIDTH] IN BLOOD BY AUTOMATED COUNT: 18.3 % (ref 11.5–14.5)
GLOBULIN SER CALC-MCNC: 3.7 G/DL (ref 2–4)
GLUCOSE BLD STRIP.AUTO-MCNC: 259 MG/DL (ref 65–100)
GLUCOSE BLD STRIP.AUTO-MCNC: 282 MG/DL (ref 65–100)
GLUCOSE BLD STRIP.AUTO-MCNC: 306 MG/DL (ref 65–100)
GLUCOSE BLD STRIP.AUTO-MCNC: 325 MG/DL (ref 65–100)
GLUCOSE SERPL-MCNC: 199 MG/DL (ref 65–100)
HCT VFR BLD AUTO: 38.4 % (ref 36.6–50.3)
HGB BLD-MCNC: 12.3 G/DL (ref 12.1–17)
IMM GRANULOCYTES # BLD AUTO: 0 K/UL (ref 0–0.04)
IMM GRANULOCYTES NFR BLD AUTO: 0 % (ref 0–0.5)
LYMPHOCYTES # BLD: 1.9 K/UL (ref 0.8–3.5)
LYMPHOCYTES NFR BLD: 21 % (ref 12–49)
MCH RBC QN AUTO: 29.2 PG (ref 26–34)
MCHC RBC AUTO-ENTMCNC: 32 G/DL (ref 30–36.5)
MCV RBC AUTO: 91.2 FL (ref 80–99)
MONOCYTES # BLD: 0.7 K/UL (ref 0–1)
MONOCYTES NFR BLD: 8 % (ref 5–13)
NEUTS SEG # BLD: 6.2 K/UL (ref 1.8–8)
NEUTS SEG NFR BLD: 69 % (ref 32–75)
NRBC # BLD: 0 K/UL (ref 0–0.01)
NRBC BLD-RTO: 0 PER 100 WBC
PLATELET # BLD AUTO: 249 K/UL (ref 150–400)
PMV BLD AUTO: 8.6 FL (ref 8.9–12.9)
POTASSIUM SERPL-SCNC: 3.9 MMOL/L (ref 3.5–5.1)
PROT SERPL-MCNC: 6.4 G/DL (ref 6.4–8.2)
RBC # BLD AUTO: 4.21 M/UL (ref 4.1–5.7)
SERVICE CMNT-IMP: ABNORMAL
SODIUM SERPL-SCNC: 131 MMOL/L (ref 136–145)
WBC # BLD AUTO: 9 K/UL (ref 4.1–11.1)

## 2019-12-03 PROCEDURE — 82962 GLUCOSE BLOOD TEST: CPT

## 2019-12-03 PROCEDURE — 77010033678 HC OXYGEN DAILY

## 2019-12-03 PROCEDURE — 65660000000 HC RM CCU STEPDOWN

## 2019-12-03 PROCEDURE — 97162 PT EVAL MOD COMPLEX 30 MIN: CPT

## 2019-12-03 PROCEDURE — 74011250636 HC RX REV CODE- 250/636: Performed by: INTERNAL MEDICINE

## 2019-12-03 PROCEDURE — 80053 COMPREHEN METABOLIC PANEL: CPT

## 2019-12-03 PROCEDURE — 74011250637 HC RX REV CODE- 250/637: Performed by: INTERNAL MEDICINE

## 2019-12-03 PROCEDURE — 36415 COLL VENOUS BLD VENIPUNCTURE: CPT

## 2019-12-03 PROCEDURE — 85025 COMPLETE CBC W/AUTO DIFF WBC: CPT

## 2019-12-03 PROCEDURE — 74011636637 HC RX REV CODE- 636/637: Performed by: INTERNAL MEDICINE

## 2019-12-03 PROCEDURE — 97530 THERAPEUTIC ACTIVITIES: CPT

## 2019-12-03 RX ORDER — BALSAM PERU/CASTOR OIL
OINTMENT (GRAM) TOPICAL 2 TIMES DAILY
Status: DISCONTINUED | OUTPATIENT
Start: 2019-12-03 | End: 2019-12-05 | Stop reason: HOSPADM

## 2019-12-03 RX ADMIN — TERAZOSIN HYDROCHLORIDE 10 MG: 5 CAPSULE ORAL at 21:30

## 2019-12-03 RX ADMIN — Medication 10 ML: at 15:33

## 2019-12-03 RX ADMIN — INSULIN GLARGINE 50 UNITS: 100 INJECTION, SOLUTION SUBCUTANEOUS at 08:44

## 2019-12-03 RX ADMIN — SENNOSIDES 8.6 MG: 8.6 TABLET, FILM COATED ORAL at 21:31

## 2019-12-03 RX ADMIN — INSULIN LISPRO 3 UNITS: 100 INJECTION, SOLUTION INTRAVENOUS; SUBCUTANEOUS at 21:32

## 2019-12-03 RX ADMIN — APIXABAN 5 MG: 5 TABLET, FILM COATED ORAL at 21:31

## 2019-12-03 RX ADMIN — INSULIN LISPRO 7 UNITS: 100 INJECTION, SOLUTION INTRAVENOUS; SUBCUTANEOUS at 17:31

## 2019-12-03 RX ADMIN — Medication 10 ML: at 03:10

## 2019-12-03 RX ADMIN — DOCUSATE SODIUM 100 MG: 100 CAPSULE, LIQUID FILLED ORAL at 17:30

## 2019-12-03 RX ADMIN — CASTOR OIL AND BALSAM, PERU: 788; 87 OINTMENT TOPICAL at 15:34

## 2019-12-03 RX ADMIN — DOCUSATE SODIUM 100 MG: 100 CAPSULE, LIQUID FILLED ORAL at 08:47

## 2019-12-03 RX ADMIN — INSULIN LISPRO 25 UNITS: 100 INJECTION, SOLUTION INTRAVENOUS; SUBCUTANEOUS at 08:46

## 2019-12-03 RX ADMIN — METOPROLOL TARTRATE 100 MG: 50 TABLET, FILM COATED ORAL at 17:30

## 2019-12-03 RX ADMIN — FINASTERIDE 5 MG: 5 TABLET, FILM COATED ORAL at 21:31

## 2019-12-03 RX ADMIN — TORSEMIDE 10 MG: 20 TABLET ORAL at 08:46

## 2019-12-03 RX ADMIN — INSULIN LISPRO 7 UNITS: 100 INJECTION, SOLUTION INTRAVENOUS; SUBCUTANEOUS at 12:00

## 2019-12-03 RX ADMIN — ATORVASTATIN CALCIUM 20 MG: 20 TABLET, FILM COATED ORAL at 21:31

## 2019-12-03 RX ADMIN — INSULIN LISPRO 25 UNITS: 100 INJECTION, SOLUTION INTRAVENOUS; SUBCUTANEOUS at 17:31

## 2019-12-03 RX ADMIN — POTASSIUM CHLORIDE 20 MEQ: 750 TABLET, FILM COATED, EXTENDED RELEASE ORAL at 08:46

## 2019-12-03 RX ADMIN — APIXABAN 5 MG: 5 TABLET, FILM COATED ORAL at 15:30

## 2019-12-03 RX ADMIN — OXYCODONE HYDROCHLORIDE AND ACETAMINOPHEN 1 TABLET: 5; 325 TABLET ORAL at 15:30

## 2019-12-03 RX ADMIN — ASPIRIN 81 MG: 81 TABLET, COATED ORAL at 08:46

## 2019-12-03 RX ADMIN — TORSEMIDE 10 MG: 20 TABLET ORAL at 17:30

## 2019-12-03 RX ADMIN — OXYCODONE HYDROCHLORIDE AND ACETAMINOPHEN 1 TABLET: 5; 325 TABLET ORAL at 21:30

## 2019-12-03 RX ADMIN — INSULIN LISPRO 5 UNITS: 100 INJECTION, SOLUTION INTRAVENOUS; SUBCUTANEOUS at 08:45

## 2019-12-03 RX ADMIN — MORPHINE SULFATE 1 MG: 2 INJECTION, SOLUTION INTRAMUSCULAR; INTRAVENOUS at 12:05

## 2019-12-03 RX ADMIN — POTASSIUM CHLORIDE 20 MEQ: 750 TABLET, FILM COATED, EXTENDED RELEASE ORAL at 17:30

## 2019-12-03 RX ADMIN — METOPROLOL TARTRATE 100 MG: 50 TABLET, FILM COATED ORAL at 08:46

## 2019-12-03 RX ADMIN — FEBUXOSTAT 40 MG: 40 TABLET, FILM COATED ORAL at 08:51

## 2019-12-03 RX ADMIN — Medication 10 ML: at 22:00

## 2019-12-03 NOTE — PROGRESS NOTES
Progress Note      12/3/2019 4:33 PM  NAME: Sara Gu   MRN:  [id number]   Admit Diagnosis: New onset a-fib ([facility] [I48.91]; Hematuria [R31.9]; Renal cell carcinoma (HCC) [C64.9]     Assessment:       Atrial Fibrillation of unknown duration   Chest pain   Hx of CAD    Unknown LV function. Remote PCI  at [facility]. center years ago. Renal cell CA , s/p R nephrectomy , now with metastatic disease. Diabetes type 2   Severe Diabetic neupropathy. He is pretty much bed bound, uses a beside commode. Unable to walk with a walker. Hx of CHF   HTN   Hyperlipidemia. Gross hematuria present today . Morbid Obesity . CKD 3     His ChadsVasc score is 4 , giving 4% risk of stroke. This is considered high risk and he should be on anticoag . Per protocol. Per urology the hematuria has resolved       Echo showed normal LV fx and no significant valve disease. Normal size LA / RA         Plan: Will start him on Eliquis and see how he tolerates  Continue meds. Plans for transfer [facility] the [facility] noted. OK to transfer . F/u with his [address]         [x]        High complexity decision making was performed    Subjective: Sara Gu denies chest pain, dyspnea. Discussed with RN events overnight. Patient Active Problem List   Diagnosis Code    Renal cell carcinoma (HCC) C64.9    New onset a-fib ([facility] I48.91    Hematuria R31.9       Review of Systems:    Symptom Y/N Comments  Symptom Y/N Comments   Fever/Chills N   Chest Pain N    Poor Appetite N   Edema N    Cough N   Abdominal Pain N    Sputum N   Joint Pain N    SOB/LOMBARDI N   Pruritis/Rash N    Nausea/vomit N   Tolerating PT/OT Y    Diarrhea N   Tolerating Diet Y    Constipation N   Other       Could NOT obtain due to:      Objective:      Physical Exam:    Last 24hrs VS reviewed since prior progress note.  Most recent are:    Visit Vitals  /60 (BP 1 Location: Left arm, BP Patient Position: At rest)   Pulse 79   Temp 97.4 °F (36.3 °C)   Resp 18   Ht 5' 11\" (1.803 m)   Wt 157.9 kg (348 lb 1.7 oz)   SpO2 95%   BMI 48.55 kg/m²       Intake/Output Summary (Last 24 hours) at 12/3/2019 1219  Last data filed at 12/3/2019 0716  Gross per 24 hour   Intake 480 ml   Output 700 ml   Net -220 ml        General Appearance: Well developed, well nourished, alert & oriented x 3,    no acute distress. Ears/Nose/Mouth/Throat: Hearing grossly normal.  Neck: Supple. Chest: Lungs clear to auscultation bilaterally. Cardiovascular: Regular rate and rhythm, S1S2 normal, no murmur. Abdomen: Soft, non-tender, bowel sounds are active. Extremities: No edema bilaterally. Skin: Warm and dry. PMH/SH reviewed - no change compared to H&P    Data Review    Telemetry: normal sinus rhythm     Lab Data Personally Reviewed:    Recent Labs     12/03/19  0256 12/01/19  1131   WBC 9.0 10.6   HGB 12.3 14.5   HCT 38.4 45.2    269   LABRCNT(INR:3,PTP:3,APTT:3,)  Recent Labs     12/03/19  0256 12/02/19  0338 12/01/19  1131   * 134* 138   K 3.9 3.8 3.3*   CL 95* 96* 99   CO2 27 26 27   BUN 50* 45* 40*   CREA 1.62* 1.61* 1.63*   * 239* 144*   CA 8.5 8.7 9.3   MG  --  2.2  --    LABRCNT(CPK:3,CpKMB:3,ckndx:3,troiq:3)No results found for: CHOL, CHOLX, CHLST, CHOLV, HDL, HDLP, LDL, LDLC, DLDLP, TGLX, TRIGL, TRIGP, CHHD, CHHDXLABRCNT(sgot:3,gpt:3,ap:3,tbiL:3,TP:3,ALB:3,GLOB:3,ggt:3,aml:3,amyp:3,lpse:3,hlpse:3)No results for input(s): PH, PCO2, PO2 in the last 72 hours. No results found for: CHOL, CHOLX, CHLST, CHOLV, HDL, HDLP, LDL, LDLC, DLDLP, TGLX, TRIGL, TRIGP, CHHD, CHHDXMEDTABLETimothy Willie Miller MD  No results for input(s): PH, PCO2, PO2 in the last 72 hours.     Medications Personally Reviewed:    Current Facility-Administered Medications   Medication Dose Route Frequency    cabozantinib tab 40 mg (Patient Supplied)  40 mg Oral DAILY    balsam peru-castor oil (VENELEX) ointment   Topical BID    acetaminophen (TYLENOL) tablet 650 mg  650 mg Oral Q6H PRN    aspirin delayed-release tablet 81 mg  81 mg Oral DAILY    atorvastatin (LIPITOR) tablet 20 mg  20 mg Oral QHS    docusate sodium (COLACE) capsule 100 mg  100 mg Oral BID    febuxostat (ULORIC) tablet 40 mg  40 mg Oral DAILY    finasteride (PROSCAR) tablet 5 mg  5 mg Oral QHS    metoprolol tartrate (LOPRESSOR) tablet 100 mg  100 mg Oral BID    oxyCODONE-acetaminophen (PERCOCET) 5-325 mg per tablet 1 Tab  1 Tab Oral Q4H PRN    potassium chloride SR (KLOR-CON 10) tablet 20 mEq  20 mEq Oral BID    senna (SENOKOT) tablet 8.6 mg  1 Tab Oral QHS    terazosin (HYTRIN) capsule 10 mg  10 mg Oral QHS    torsemide (DEMADEX) tablet 10 mg  10 mg Oral BID    sodium chloride (NS) flush 5-40 mL  5-40 mL IntraVENous Q8H    sodium chloride (NS) flush 5-40 mL  5-40 mL IntraVENous PRN    naloxone (NARCAN) injection 0.4 mg  0.4 mg IntraVENous PRN    morphine injection 1 mg  1 mg IntraVENous Q4H PRN    insulin glargine (LANTUS) injection 50 Units  50 Units SubCUTAneous DAILY    insulin lispro (HUMALOG) injection   SubCUTAneous AC&HS    glucose chewable tablet 16 g  4 Tab Oral PRN    dextrose (D50) infusion 12.5-25 g  12.5-25 g IntraVENous PRN    glucagon (GLUCAGEN) injection 1 mg  1 mg IntraMUSCular PRN    insulin lispro (HUMALOG) injection 25 Units  25 Units SubCUTAneous ACB&D         Mt Subramanian MD

## 2019-12-03 NOTE — PROGRESS NOTES
1900: Bedside shift change report given to Indu Ly (oncoming nurse) by Inés Ibarra (offgoing nurse). Report included the following information SBAR.     2000: 02 sats on room air are 88%, placed on 1L of oxygen    0300:   Patient had a small amount of bloody discharge after using the urinal    0700:   Bedside shift change report GIVEN TO Inés Ibarra RN. Report included the following information SBAR. SIGNIFICANT CHANGES DURING SHIFT:        CONCERNS TO ADDRESS WITH MD:            [facility] NURSING NOTE   Admission Date 12/1/2019   Admission Diagnosis New onset a-fib ([facility] [I48.91]  Hematuria [R31.9]  Renal cell carcinoma (HCC) [C64.9]   Consults IP CONSULT TO CARDIOLOGY  IP CONSULT TO UROLOGY      Cardiac Monitoring [x] Yes [] No      Purposeful Hourly Rounding [x] Yes    Lopez Score Total Score: 2   Lopez score 3 or > [x] Bed Alarm [] Avasys [] 1:1 sitter [] Patient refused (Signed refusal form in chart)   Jose Roberto Score Jose Roberto Score: 16   Jose Roberto score 14 or < [] PMT consult [] Wound Care consult    []  Specialty bed  [] Nutrition consult      Influenza Vaccine Received Flu Vaccine for Current Season (usually Sept-March): Yes           Oxygen needs? [] Room air Oxygen @  [x]1L    []2L    []3L   []4L    []5L   []6L via  NC   Chronic home O2 use?  [] Yes [x] No  Perform O2 challenge test and document in progress note using smartphrase (.Homeoxygen)      Last bowel movement Last Bowel Movement Date: 12/01/19      Urinary Catheter             LDAs               Peripheral IV 12/01/19 Left Hand (Active)   Site Assessment Clean, dry, & intact 12/3/2019  2:38 AM   Phlebitis Assessment 0 12/3/2019  2:38 AM   Infiltration Assessment 0 12/3/2019  2:38 AM   Dressing Status Clean, dry, & intact 12/3/2019  2:38 AM   Dressing Type Tape;Transparent 12/3/2019  2:38 AM   Hub Color/Line Status Pink;Flushed 12/3/2019  2:38 AM   Action Taken Open ports on tubing capped 12/2/2019 12:00 PM   Alcohol Cap Used Yes 12/2/2019 12:00 PM Readmission Risk Assessment Tool Score Low Risk            7       Total Score        2 . Living with Significant Other. Assisted Living. LTAC. SNF. or   Rehab    5 Pt.  Coverage (Medicare=5 , Medicaid, or Self-Pay=4)        Criteria that do not apply:    Has Seen PCP in Last 6 Months (Yes=3, No=0)    Patient Length of Stay (>5 days = 3)    IP Visits Last 12 Months (1-3=4, 4=9, >4=11)    Charlson Comorbidity Score (Age + Comorbid Conditions)       Expected Length of Stay - - -   Actual Length of Stay 2

## 2019-12-03 NOTE — PROGRESS NOTES
Problem: Mobility Impaired (Adult and Pediatric)  Goal: *Acute Goals and Plan of Care (Insert Text)  Description  FUNCTIONAL STATUS PRIOR TO ADMISSION: Patient was modified independent for transfers to a wheelchair and power wheelchair for functional mobility. He has not ambulated for over a year due to BLE numbness. He requires assist for ADLS. HOME SUPPORT PRIOR TO ADMISSION: The patient lived with his wife who assists with ADLS. Physical Therapy Goals  Initiated 12/3/2019  1. Patient will move from supine to sit and sit to supine , scoot up and down and roll side to side in bed with modified independence within 7 day(s). 2.  Patient will transfer from bed to chair and chair to bed with modified independence using the least restrictive device within 7 day(s). 3.  Patient will perform sit to stand with modified independence within 7 day(s). Outcome: Not Met   PHYSICAL THERAPY EVALUATION  Patient: [address] [place] [address] Marie (75 y.o. male)  Date: 12/3/2019  Primary Diagnosis: New onset a-fib ([facility] [I48.91]  Hematuria [R31.9]  [facility] [C64.9]        Precautions:          ASSESSMENT  Based on the objective data described below, the patient presents with decreased strength, decreased functional mobility, impaired balance, decreased endurance, and minimal pain following admission for Afib. Patient is currently functioning below his baseline. He required assist x 1 for bed mobility and transfers. Patient does not ambulate at baseline. VSS on RA. Current Level of Function Impacting Discharge (mobility/balance): min A x 1 for transfers    Functional Outcome Measure: The patient scored 50/100 on the Barthel outcome measure which is indicative of moderate functional impairment. Other factors to consider for discharge: endurance, pain     Patient will benefit from skilled therapy intervention to address the above noted impairments.        PLAN :  Recommendations and Planned Interventions: bed mobility training, transfer training, therapeutic exercises, patient and family training/education and therapeutic activities      Frequency/Duration: Patient will be followed by physical therapy:  3 times a week to address goals. Recommendation for discharge: (in order for the patient to meet his/her long term goals)  Physical therapy at least 2 days/week in the home with support from wife/family    This discharge recommendation:  Has not yet been discussed the attending provider and/or case management    IF patient discharges home will need the following DME: patient owns DME required for discharge         SUBJECTIVE:   Patient stated It is nice to be up in the chair.     OBJECTIVE DATA SUMMARY:   HISTORY:    Past Medical History:   Diagnosis Date    Cancer ([facility]     Diabetes ([facility]     Hypertension      Past Surgical History:   Procedure Laterality Date    HX NEPHROSTOMY      HX ORTHOPAEDIC         Personal factors and/or comorbidities impacting plan of care: pain, bone mets, endurance    Home Situation  Home Environment: Private residence  Wheelchair Ramp: Yes  One/Two Story Residence: One story  Living Alone: No  Support Systems: Spouse/Significant Other/Partner  Patient Expects to be Discharged to[de-identified] Unknown  Current DME Used/Available at Home: Wheelchair, power, Wheelchair, Commode, bedside    EXAMINATION/PRESENTATION/DECISION MAKING:   Critical Behavior:  Neurologic State: Alert  Orientation Level: Oriented X4  Cognition: Follows commands     Hearing:   Auditory  Auditory Impairment: None  Skin:    Edema:   Range Of Motion:  AROM: Generally decreased, functional                       Strength:    Strength: Generally decreased, functional                    Tone & Sensation:   Tone: Normal              Sensation: Impaired(B feet numb )               Coordination:  Coordination: Within functional limits  Vision:      Functional Mobility:  Bed Mobility:  Rolling: Minimum assistance  Supine to Sit: Moderate assistance     Scooting: Contact guard assistance  Transfers:  Sit to Stand: Minimum assistance  Stand to Sit: Minimum assistance        Bed to Chair: Minimum assistance; Additional time;Assist x1              Balance:   Sitting: Intact; Without support  Standing: Impaired; With support  Standing - Static: Constant support; Fair  Standing - Dynamic : Fair  Ambulation/Gait Training:              Gait Description (WDL): (does not ambulate at baseline )          Functional Measure:  Barthel Index:    Bathin  Bladder: 10  Bowels: 10  Groomin  Dressin  Feeding: 10  Mobility: 0  Stairs: 0  Toilet Use: 5  Transfer (Bed to Chair and Back): 10  Total: 50/100       The Barthel ADL Index: Guidelines  1. The index should be used as a record of what a patient does, not as a record of what a patient could do. 2. The main aim is to establish degree of independence from any help, physical or verbal, however minor and for whatever reason. 3. The need for supervision renders the patient not independent. 4. A patient's performance should be established using the best available evidence. Asking the patient, friends/relatives and nurses are the usual sources, but direct observation and common sense are also important. However direct testing is not needed. 5. Usually the patient's performance over the preceding 24-48 hours is important, but occasionally longer periods will be relevant. 6. Middle categories imply that the patient supplies over 50 per cent of the effort. 7. Use of aids to be independent is allowed. Lorena Moralez., Barthel DNatanaelW. (2930). Functional evaluation: the Barthel Index. [address] [facility] (14)2. JOSIAH Salazar, Roseann Dowd, Joce Carlin., Damián, [address] [place] [address] (). Measuring the change indisability after inpatient rehabilitation; comparison of the responsiveness of the Barthel Index and Functional [place] Measure.  Journal of Neurology, Neurosurgery, and Psychiatry, 66(4), [phone] 61. RIANNA Massey, LATANYA Bojorquez, & Yanique White M.A. (2004.) Assessment of post-stroke quality of life in cost-effectiveness studies: The usefulness of the Barthel Index and the EuroQoL-5D. Quality of Life Research, 15, 208-03            Physical Therapy Evaluation Charge Determination   History Examination Presentation Decision-Making   MEDIUM  Complexity : 1-2 comorbidities / personal factors will impact the outcome/ POC  MEDIUM Complexity : 3 Standardized tests and measures addressing body structure, function, activity limitation and / or participation in recreation  MEDIUM Complexity : Evolving with changing characteristics  Other outcome measures Barthel   MEDIUM      Based on the above components, the patient evaluation is determined to be of the following complexity level: MEDIUM        Activity Tolerance:   Fair and SpO2 stable on RA  Please refer to the flowsheet for vital signs taken during this treatment. After treatment patient left in no apparent distress:   Sitting in chair and Call bell within reach    COMMUNICATION/EDUCATION:   The patients plan of care was discussed with: Registered Nurse and . Fall prevention education was provided and the patient/caregiver indicated understanding., Patient/family have participated as able in goal setting and plan of care. and Patient/family agree to work toward stated goals and plan of care.     Thank you for this referral.  Lois Araujo, PT, DPT   Time Calculation: 27 mins

## 2019-12-03 NOTE — PROGRESS NOTES
PIA:  1. Transfer to [facility] - transfer forms and records faxed to center  2. EMTALA transport once pt accepted for transfer  3. If not accepted for transfer, plan is to return home with wife and HH once medically stable  4. OP f/u with [facility] medical team      Reason for Admission:   New onset a-fib, hematuria, metastatic renal cell carcinoma                   RRAT Score:  7                   Plan for utilizing home health: Per PT, pt at functional baseline. May benefit from [address]. Current Advanced Directive/Advance Care Plan: AMD not on file. Full code. Transition of Care Plan: [facility] Transfer    CM met with patient at the bedside to introduce role, verify demographics, and discuss discharge planning. Patient was awake and alert/oriented x4. Pt is a 74 yo  male admitted to [facility] on 12/1/19 for chest pain. Pt is being treated for new onset a-fib, hematuria, and metastatic renal cell carcinoma. PMHx of CHF, CKD 3, severe diabetic neuropathy, DM 2, CAD, HTN, etc. Pt currently receiving chemotherapy treatment renal cancer. Urology and Cardiology are following. Pt has requested to transfer to the [facility] for further treatment. Transfer forms signed by pt and attending - faxed along with medical records to [facility] (fx. [phone]). Pt receives all medical treatment through the [place] and is assigned to the [facility]. Pt stated his current PCP is ESTEFANY Hernandez. At baseline, pt is non-ambulatory and is primarily bed bed-bound, with moderate independence transferring to/from . Pt has access to cane, RW, WC, scooter, BSC, etc. Pt's wife provides majority of assistance with ADLs including bathing and dressing. Pt does not drive and [facility] to get to medical appointments. Pt denied hx of HH, SNF, or IP Rehab, however he stated he attended OP physical rehab at the [place] 2-3 years ago.  CM will continue to follow and assist with d/c planning as needed. PCP: ESTEFANY Guzman at [facility]  Urologist: [facility]  Cardiologist: [address]: [address] Management Interventions  PCP Verified by CM: Yes(Jamaica Sheldon NP at [facility])  Mode of Transport at Discharge:  Other (see comment)(if transfer to [place], [facility])  Transition of Care Consult (CM Consult): Discharge Planning(initial eval - anticipate pt to transfer to [facility])  [address] [place]ajo: No  Discharge Durable Medical Equipment: No(has access to can, RW, WC, scooter, BSC)  Physical Therapy Consult: Yes  Occupational Therapy Consult: No  Speech Therapy Consult: No  Current Support Network: Lives with Spouse, Own Home, Lives with Caregiver(pt and wife live together in single story home with ramp entry)  Confirm Follow Up Transport: Other (see comment)(wife or VA sponsored transportation ([facility]))  Plan discussed with Pt/Family/Caregiver: Yes(discussed with pt at bedside)  Freedom of Choice Offered: Yes(pt signed [facility] transfer preference forms)  Discharge Location  Discharge Placement: [address] [place] [address] [facility]  Care Manager  [phone]

## 2019-12-03 NOTE — DIABETES MGMT
[facility]    DTC Progress Note    Recommendations/ Comments: If appropriate, please consider increasing Lantus to 60 units starting tomorrow am.    Current hospital DM medication: Lantus 50 units daily am, Lispro 25 units ac tid and correctional insulin - normal sensitivity ac and hs. Chart reviewed on [facility]. Patient is a 75 y.o. male with known DM on U500 Regular insulin - 200 units breakfast and 50 units dinner at home. A1c:   No results found for: HBA1C, HGBE8, YLD9UOPM    Recent Glucose Results:   Lab Results   Component Value Date/Time     (H) 12/03/2019 02:56 AM    GLUCPOC 306 (H) 12/03/2019 11:30 AM    GLUCPOC 259 (H) 12/03/2019 07:28 AM    GLUCPOC 209 (H) 12/02/2019 08:36 PM        Lab Results   Component Value Date/Time    Creatinine 1.62 (H) 12/03/2019 02:56 AM     Estimated Creatinine Clearance: 61.3 mL/min (A) (based on SCr of 1.62 mg/dL (H)). Active Orders   Diet    DIET DIABETIC CONSISTENT CARB Regular; 2 GM NA (House Low NA); AHA-LOW-CHOL FAT        PO intake:   Patient Vitals for the past 72 hrs:   % Diet Eaten   12/02/19 1300 50 %   12/02/19 0900 25 %       Will continue to follow as needed.     Thank you  [address]    Time spent: 10 minutes

## 2019-12-03 NOTE — PROGRESS NOTES
0730  Report received from [facility]. SBAR, Kardex, ED Summary, Procedure Summary, Intake/Output, MAR, Accordion, Recent Results, Med Rec Status and Cardiac Rhythm a-fib were discussed. Nicole 33 with Dr. Samuel Campbell and confirmed that he does want to start eliquis on patient. He stated yes, we can then see how he responds to it while in the hospital.     Request faxed to [facility] for records to be faxed to nurses station on [facility]. If they arrive they will be placed in patients chart.

## 2019-12-03 NOTE — PROGRESS NOTES
Problem: Pressure Injury - Risk of  Goal: *Prevention of pressure injury  Description  Document Jose Roberto Scale and appropriate interventions in the flowsheet. Outcome: Progressing Towards Goal  Note: Pressure Injury Interventions:  Sensory Interventions: Assess changes in LOC, Float heels, Keep linens dry and wrinkle-free    Moisture Interventions: Absorbent underpads, Minimize layers    Activity Interventions: Assess need for specialty bed, Increase time out of bed    Mobility Interventions: Assess need for specialty bed, Chair cushion, HOB 30 degrees or less    Nutrition Interventions: Document food/fluid/supplement intake    Friction and Shear Interventions: Apply protective barrier, creams and emollients, HOB 30 degrees or less, Minimize layers                Problem: Falls - Risk of  Goal: *Absence of Falls  Description  Document Nela Lozada Fall Risk and appropriate interventions in the flowsheet.   Outcome: Progressing Towards Goal  Note: Fall Risk Interventions:            Medication Interventions: Bed/chair exit alarm, Teach patient to arise slowly, Patient to call before getting OOB    Elimination Interventions: Bed/chair exit alarm, Call light in reach, Patient to call for help with toileting needs

## 2019-12-03 NOTE — PROGRESS NOTES
Hospitalist Progress Note    NAME: Sara Gu   :  1945   MRN:  [id number]       Assessment / Plan:  New onset A. Fib POA  Chest pain POA  History of CAD status post stent   Patient usually goes to [facility].  Cardiology preferred medical management of possible acute coronary syndrome vs [facility]  Started on Eliquis by cardiology after hematuria was resolved as YUMIKO score 4  Continue to monitor for any signs of hematuria  Continue BB, statins    Gross hematuria - resolved  H/o metastatic renal cell cancer status post right nephrectomy  Started on Eliquis by cardiology now who like to monitor signs of bleed on Eliquis  Urology saw the patient and recommended OP followup Urology at [facility]  H&H stable  Continue Proscar and Hytrin    Compensated Chronic Heart Failure with ? EF  Continue torsemide    Diabetes Type 2 insulin  Continue home regimen of Lantus 50 units daily in the morning  SSI lispro     CKD3 with ? Baseline  Cr remain stable     Morbid obesity POA with Body mass index is 48.55 kg/m². Weight loss would be challenging due to nonambulatory status at baseline    Code Status:  full code   Surrogate Decision Maker: Wife Dion Donovan #[id number]  DVT Prophylaxis: SCDs  GI Prophylaxis: not indicated  Baseline: Patient lives with his wife at home pretty much nonambulatory at baseline, uses electric scooter to move around to get to his appointments , otherwise uses walker at home as per wife    Recommended Disposition: TBD     Subjective: Pt seen and examined at bedside. NAD. Overnight events d/w RN.      Chief Complaint / Reason for Physician Visit: f/u \"hematuria and chest pain\"     Review of Systems:  Symptom Y/N Comments  Symptom Y/N Comments   Fever/Chills n   Chest Pain n    Poor Appetite    Edema     Cough n   Abdominal Pain n    Sputum    Joint Pain     SOB/LOMBARDI y   Pruritis/Rash     Nausea/vomit    Tolerating PT/OT     Diarrhea    Tolerating Diet     Constipation    Other Could NOT obtain due to:      Objective:     VITALS:   Last 24hrs VS reviewed since prior progress note. Most recent are:  Patient Vitals for the past 24 hrs:   Temp Pulse Resp BP SpO2   12/03/19 0810 97.4 °F (36.3 °C) 79 18 147/60 95 %   12/03/19 0310 97.6 °F (36.4 °C) 84 18 122/46 94 %   12/02/19 2233 98.6 °F (37 °C) 64 16 143/60 94 %   12/02/19 1954 97.7 °F (36.5 °C) 67 18 136/61 95 %   12/02/19 1729  65      12/02/19 1611 98 °F (36.7 °C) (!) 59 20 128/62 98 %       Intake/Output Summary (Last 24 hours) at 12/3/2019 1608  Last data filed at 12/3/2019 0716  Gross per 24 hour   Intake 360 ml   Output 700 ml   Net -340 ml        PHYSICAL EXAM:  General: WD, WN. Alert, cooperative, no acute distress  , morbidly obese patient. EENT:  EOMI. Anicteric sclerae. MMM  Resp:  Difficult to appreciate breath sounds because of body habitus. Decrease in the lower lungs. No wheezing. CV:  Irregularly irregular normal rate,  No edema  GI:  Soft, Non distended, Non tender.  +Bowel sounds  Neurologic:  Alert and oriented X 3, normal speech,     Reviewed most current lab test results and cultures  YES  Reviewed most current radiology test results   YES  Review and summation of old records today    NO  Reviewed patient's current orders and MAR    YES  PMH/ reviewed - no change compared to H&P  ________________________________________________________________________  Care Plan discussed with:    Comments   Patient y    Family  y    RN y    Care Manager     Consultant                        Multidiciplinary team rounds were held today with , nursing, pharmacist and clinical coordinator. Patient's plan of care was discussed; medications were reviewed and discharge planning was addressed.      ________________________________________________________________________  Total NON critical care TIME: 30 Minutes    Total CRITICAL CARE TIME Spent:   Minutes non procedure based      Comments   >50% of visit spent in counseling and coordination of care     ________________________________________________________________________  Duane Plane, MD     Procedures: see electronic medical records for all procedures/Xrays and details which were not copied into this note but were reviewed prior to creation of Plan. LABS:  I reviewed today's most current labs and imaging studies.   Pertinent labs include:  Recent Labs     12/03/19  0256 12/01/19  1131   WBC 9.0 10.6   HGB 12.3 14.5   HCT 38.4 45.2    269     Recent Labs     12/03/19  0256 12/02/19  0338 12/01/19  1131   * 134* 138   K 3.9 3.8 3.3*   CL 95* 96* 99   CO2 27 26 27   * 239* 144*   BUN 50* 45* 40*   CREA 1.62* 1.61* 1.63*   CA 8.5 8.7 9.3   MG  --  2.2  --    ALB 2.7*  --  3.2*   TBILI 1.1*  --  0.7   SGOT 61*  --  53*   ALT 54  --  57       Signed: Duane Plane, MD

## 2019-12-03 NOTE — PROGRESS NOTES
Progress Note    Patient: Saturnino Joe MRN: [id number]  SSN: [id number]    YOB: 1945  Age: 75 y.o. Sex: male        ADMITTED:  2019 to Zay Prasad MD  for New onset a-fib [facility]) [I48.91]  Hematuria [R31.9]  Renal cell carcinoma [facility]) [C64.9]         Saturnino Joe is doing well this am, urine racked overnight, urine is clear/yellow. He denies pain and reports no issues w/ emptying his bladder. Hgb 12.3. Creatinine stable, 1.62. Vitals:  Temp (24hrs), Av °F (36.7 °C), Min:97.4 °F (36.3 °C), Max:98.6 °F (37 °C)     Blood pressure 147/60, pulse 79, temperature 97.4 °F (36.3 °C), resp. rate 18, height 5' 11\" (1.803 m), weight 157.9 kg (348 lb 1.7 oz), SpO2 95 %. I&O's:  1901 - 700  In: 730 [P.O.:730]  Out: 900 [Urine:900]   701 - 1900  In: -   Out: 100 [Urine:100]     Exam:    Appearance: Well-developed no acute distress  Conjunctiva: Conjunctiva and lids normal  External ears/nose: Normal no lesions or deformities  Hearing: Grossly intact  Respiratory effort: Breathing easily  Abdomen/flank: Soft, nontender, without masses, no CVA tenderness  Digits/nails: No clubbing cyanosis or petechiae  Skin inspection: Warm and dry  Sensation: No sensory deficits  Judgment/Insight: intact  Mood/Affect: normal     Labs:   Recent Labs     19  0256 19  1131   WBC 9.0 10.6   HGB 12.3 14.5   HCT 38.4 45.2    269     Recent Labs     19  0256 19  0338 19  1131   * 134* 138   K 3.9 3.8 3.3*   CL 95* 96* 99   CO2 27 26 27   * 239* 144*   BUN 50* 45* 40*   CREA 1.62* 1.61* 1.63*   CA 8.5 8.7 9.3        Cultures:   N/A   Imaging:  CT from  reviewed     Assessment:     Active Problems:    Renal cell carcinoma ([facility] (2019)      [facility])      Hematuria (2019)        Plan:     Urine racked overnight. Hematuria resolved.  Urine clear/yellow this am. Luciana wnl.     -Recommend f/u w/ urologist at [place], however, [facility] would be happy to see , but would need records from [place]. Pt states his PCP follows [facility].          Signed By: Kaila Mena NP - December 3, 2019

## 2019-12-04 LAB
GLUCOSE BLD STRIP.AUTO-MCNC: 290 MG/DL (ref 65–100)
GLUCOSE BLD STRIP.AUTO-MCNC: 355 MG/DL (ref 65–100)
GLUCOSE BLD STRIP.AUTO-MCNC: 385 MG/DL (ref 65–100)
GLUCOSE BLD STRIP.AUTO-MCNC: 398 MG/DL (ref 65–100)
SERVICE CMNT-IMP: ABNORMAL

## 2019-12-04 PROCEDURE — 74011250637 HC RX REV CODE- 250/637: Performed by: INTERNAL MEDICINE

## 2019-12-04 PROCEDURE — 74011636637 HC RX REV CODE- 636/637: Performed by: INTERNAL MEDICINE

## 2019-12-04 PROCEDURE — 97530 THERAPEUTIC ACTIVITIES: CPT | Performed by: PHYSICAL THERAPIST

## 2019-12-04 PROCEDURE — 82962 GLUCOSE BLOOD TEST: CPT

## 2019-12-04 PROCEDURE — 65660000000 HC RM CCU STEPDOWN

## 2019-12-04 RX ORDER — INSULIN LISPRO 100 [IU]/ML
10 INJECTION, SOLUTION INTRAVENOUS; SUBCUTANEOUS ONCE
Status: COMPLETED | OUTPATIENT
Start: 2019-12-05 | End: 2019-12-04

## 2019-12-04 RX ADMIN — INSULIN LISPRO 15 UNITS: 100 INJECTION, SOLUTION INTRAVENOUS; SUBCUTANEOUS at 12:50

## 2019-12-04 RX ADMIN — INSULIN LISPRO 5 UNITS: 100 INJECTION, SOLUTION INTRAVENOUS; SUBCUTANEOUS at 09:22

## 2019-12-04 RX ADMIN — Medication 10 ML: at 14:00

## 2019-12-04 RX ADMIN — TORSEMIDE 10 MG: 20 TABLET ORAL at 18:18

## 2019-12-04 RX ADMIN — CASTOR OIL AND BALSAM, PERU: 788; 87 OINTMENT TOPICAL at 18:18

## 2019-12-04 RX ADMIN — INSULIN LISPRO 25 UNITS: 100 INJECTION, SOLUTION INTRAVENOUS; SUBCUTANEOUS at 17:42

## 2019-12-04 RX ADMIN — FINASTERIDE 5 MG: 5 TABLET, FILM COATED ORAL at 22:24

## 2019-12-04 RX ADMIN — TORSEMIDE 10 MG: 20 TABLET ORAL at 09:23

## 2019-12-04 RX ADMIN — INSULIN LISPRO 25 UNITS: 100 INJECTION, SOLUTION INTRAVENOUS; SUBCUTANEOUS at 09:18

## 2019-12-04 RX ADMIN — INSULIN LISPRO 7 UNITS: 100 INJECTION, SOLUTION INTRAVENOUS; SUBCUTANEOUS at 17:40

## 2019-12-04 RX ADMIN — Medication 10 ML: at 05:54

## 2019-12-04 RX ADMIN — METOPROLOL TARTRATE 100 MG: 50 TABLET, FILM COATED ORAL at 18:18

## 2019-12-04 RX ADMIN — INSULIN GLARGINE 50 UNITS: 100 INJECTION, SOLUTION SUBCUTANEOUS at 09:19

## 2019-12-04 RX ADMIN — CASTOR OIL AND BALSAM, PERU: 788; 87 OINTMENT TOPICAL at 10:32

## 2019-12-04 RX ADMIN — APIXABAN 5 MG: 5 TABLET, FILM COATED ORAL at 22:22

## 2019-12-04 RX ADMIN — DOCUSATE SODIUM 100 MG: 100 CAPSULE, LIQUID FILLED ORAL at 18:18

## 2019-12-04 RX ADMIN — FEBUXOSTAT 40 MG: 40 TABLET, FILM COATED ORAL at 09:19

## 2019-12-04 RX ADMIN — SENNOSIDES 8.6 MG: 8.6 TABLET, FILM COATED ORAL at 22:25

## 2019-12-04 RX ADMIN — DOCUSATE SODIUM 100 MG: 100 CAPSULE, LIQUID FILLED ORAL at 09:19

## 2019-12-04 RX ADMIN — INSULIN LISPRO 10 UNITS: 100 INJECTION, SOLUTION INTRAVENOUS; SUBCUTANEOUS at 23:18

## 2019-12-04 RX ADMIN — OXYCODONE HYDROCHLORIDE AND ACETAMINOPHEN 1 TABLET: 5; 325 TABLET ORAL at 23:24

## 2019-12-04 RX ADMIN — APIXABAN 5 MG: 5 TABLET, FILM COATED ORAL at 09:19

## 2019-12-04 RX ADMIN — OXYCODONE HYDROCHLORIDE AND ACETAMINOPHEN 1 TABLET: 5; 325 TABLET ORAL at 10:29

## 2019-12-04 RX ADMIN — METOPROLOL TARTRATE 100 MG: 50 TABLET, FILM COATED ORAL at 09:19

## 2019-12-04 RX ADMIN — POTASSIUM BICARBONATE 20 MEQ: 782 TABLET, EFFERVESCENT ORAL at 18:18

## 2019-12-04 RX ADMIN — POTASSIUM CHLORIDE 20 MEQ: 750 TABLET, FILM COATED, EXTENDED RELEASE ORAL at 09:19

## 2019-12-04 RX ADMIN — ATORVASTATIN CALCIUM 20 MG: 20 TABLET, FILM COATED ORAL at 22:24

## 2019-12-04 RX ADMIN — Medication 10 ML: at 22:26

## 2019-12-04 RX ADMIN — TERAZOSIN HYDROCHLORIDE 10 MG: 5 CAPSULE ORAL at 22:20

## 2019-12-04 NOTE — DIABETES MGMT
[facility]    DTC Progress Note    Recommendations/ Comments: If appropriate, please consider increasing Lantus to 60 units starting tomorrow am, increasing prandial humalog to 30 units ac tid. Current hospital DM medication: Lantus 50 units daily am, Lispro 25 units ac BID and correctional insulin - normal sensitivity ac and hs. Chart reviewed on [facility] [place]. Patient is a 75 y.o. male with known DM on U500 Regular insulin - 200 units breakfast and 50 units dinner at home. A1c:   No results found for: HBA1C, HGBE8, YZY0IJTH, LRA1UKIV    Recent Glucose Results:   Lab Results   Component Value Date/Time    GLUCPOC 385 (H) 12/04/2019 11:26 AM    GLUCPOC 290 (H) 12/04/2019 07:56 AM    GLUCPOC 282 (H) 12/03/2019 08:28 PM        Lab Results   Component Value Date/Time    Creatinine 1.62 (H) 12/03/2019 02:56 AM     Estimated Creatinine Clearance: 61.3 mL/min (A) (based on SCr of 1.62 mg/dL (H)). Active Orders   Diet    DIET DIABETIC CONSISTENT CARB Regular; 2 GM NA (House Low NA); AHA-LOW-CHOL FAT        PO intake:   Patient Vitals for the past 72 hrs:   % Diet Eaten   12/02/19 1300 50 %   12/02/19 0900 25 %       Will continue to follow as needed.     Thank you  RODRICK KingstonN, RN, [address]

## 2019-12-04 NOTE — PROGRESS NOTES
Problem: Pressure Injury - Risk of  Goal: *Prevention of pressure injury  Description  Document Jose Roberto Scale and appropriate interventions in the flowsheet. Outcome: Progressing Towards Goal  Note: Pressure Injury Interventions:  Sensory Interventions: Assess changes in LOC    Moisture Interventions: Absorbent underpads    Activity Interventions: Assess need for specialty bed    Mobility Interventions: Assess need for specialty bed    Nutrition Interventions: Document food/fluid/supplement intake    Friction and Shear Interventions: Apply protective barrier, creams and emollients                Problem: Falls - Risk of  Goal: *Absence of Falls  Description  Document [facility] Fall Risk and appropriate interventions in the flowsheet.   Outcome: Progressing Towards Goal  Note: Fall Risk Interventions:  Mobility Interventions: Bed/chair exit alarm         Medication Interventions: Bed/chair exit alarm    Elimination Interventions: Bed/chair exit alarm

## 2019-12-04 NOTE — PROGRESS NOTES
PIA:  1. Transfer to [facility] - transfer forms and records faxed to transfer center  2. EMTALA transport once pt accepted for transfer  3. If not accepted for transfer, plan is to return home with wife and [place] once medically stable  4. OP f/u with [facility] medical team    CM spoke with [address] to follow-up on transfer request. Representative confirmed they received pt documentation which is still under review. They will call CM will update when available. Rogers Power MSW  Care Manager  [phone]      UPDATE 1:18 PM  Transfer center called again and requested updated records. CM faxed most recent progress notes to [phone].

## 2019-12-04 NOTE — PROGRESS NOTES
Bedside shift change report given to Yesika Win (oncoming nurse) by Ike Edmond (offgoing nurse).  Report included the following information SBAR.     2013: Pt refused turn team.    0612: Pt refused morning weight, requesting weight be taken with PT.

## 2019-12-04 NOTE — PROGRESS NOTES
Problem: Pressure Injury - Risk of  Goal: *Prevention of pressure injury  Description  Document Jose Roberto Scale and appropriate interventions in the flowsheet. Outcome: Progressing Towards Goal  Note: Pressure Injury Interventions:  Sensory Interventions: Assess changes in LOC, Assess need for specialty bed, Float heels    Moisture Interventions: Absorbent underpads, Apply protective barrier, creams and emollients    Activity Interventions: Assess need for specialty bed, Increase time out of bed    Mobility Interventions: Assess need for specialty bed, HOB 30 degrees or less    Nutrition Interventions: Document food/fluid/supplement intake    Friction and Shear Interventions: Apply protective barrier, creams and emollients, Feet elevated on foot rest, HOB 30 degrees or less, Minimize layers                Problem: Patient Education: Go to Patient Education Activity  Goal: Patient/Family Education  Outcome: Progressing Towards Goal     Problem: Falls - Risk of  Goal: *Absence of Falls  Description  Document [facility] Fall Risk and appropriate interventions in the flowsheet.   Outcome: Progressing Towards Goal  Note: Fall Risk Interventions:  Mobility Interventions: Bed/chair exit alarm, Communicate number of staff needed for ambulation/transfer         Medication Interventions: Bed/chair exit alarm, Assess postural VS orthostatic hypotension, Patient to call before getting OOB, Teach patient to arise slowly    Elimination Interventions: Bed/chair exit alarm, Call light in reach              Problem: Patient Education: Go to Patient Education Activity  Goal: Patient/Family Education  Outcome: Progressing Towards Goal     Problem: Patient Education: Go to Patient Education Activity  Goal: Patient/Family Education  Outcome: Progressing Towards Goal     Problem: Afib Pathway: Day 1  Goal: Off Pathway (Use only if patient is Off Pathway)  Outcome: Progressing Towards Goal  Goal: Activity/Safety  Outcome: Progressing Towards Goal  Goal: Consults, if ordered  Outcome: Progressing Towards Goal  Goal: Diagnostic Test/Procedures  Outcome: Progressing Towards Goal  Goal: Nutrition/Diet  Outcome: Progressing Towards Goal  Goal: Discharge Planning  Outcome: Progressing Towards Goal  Goal: Medications  Outcome: Progressing Towards Goal  Goal: Respiratory  Outcome: Progressing Towards Goal  Goal: Treatments/Interventions/Procedures  Outcome: Progressing Towards Goal  Goal: Psychosocial  Outcome: Progressing Towards Goal  Goal: *Optimal pain control at patient's stated goal  Outcome: Progressing Towards Goal  Goal: *Hemodynamically stable  Outcome: Progressing Towards Goal  Goal: *Stable cardiac rhythm  Outcome: Progressing Towards Goal  Goal: *Lungs clear or at baseline  Outcome: Progressing Towards Goal  Goal: *Labs within defined limits  Outcome: Progressing Towards Goal  Goal: *Describes available resources and support systems  Outcome: Progressing Towards Goal     Problem: Afib Pathway: Day 2  Goal: Off Pathway (Use only if patient is Off Pathway)  Outcome: Progressing Towards Goal  Goal: Activity/Safety  Outcome: Progressing Towards Goal  Goal: Consults, if ordered  Outcome: Progressing Towards Goal  Goal: Diagnostic Test/Procedures  Outcome: Progressing Towards Goal  Goal: Nutrition/Diet  Outcome: Progressing Towards Goal  Goal: Discharge Planning  Outcome: Progressing Towards Goal  Goal: Medications  Outcome: Progressing Towards Goal  Goal: Respiratory  Outcome: Progressing Towards Goal  Goal: Treatments/Interventions/Procedures  Outcome: Progressing Towards Goal  Goal: Psychosocial  Outcome: Progressing Towards Goal  Goal: *Hemodynamically stable  Outcome: Progressing Towards Goal  Goal: *Optimal pain control at patient's stated goal  Outcome: Progressing Towards Goal  Goal: *Stable cardiac rhythm  Outcome: Progressing Towards Goal  Goal: *Lungs clear or at baseline  Outcome: Progressing Towards Goal  Goal: *Describes available resources and support systems  Outcome: Progressing Towards Goal     Problem: Afib Pathway: Day 3  Goal: Off Pathway (Use only if patient is Off Pathway)  Outcome: Progressing Towards Goal  Goal: Activity/Safety  Outcome: Progressing Towards Goal  Goal: Diagnostic Test/Procedures  Outcome: Progressing Towards Goal  Goal: Nutrition/Diet  Outcome: Progressing Towards Goal  Goal: Discharge Planning  Outcome: Progressing Towards Goal  Goal: Medications  Outcome: Progressing Towards Goal  Goal: Respiratory  Outcome: Progressing Towards Goal  Goal: Treatments/Interventions/Procedures  Outcome: Progressing Towards Goal  Goal: Psychosocial  Outcome: Progressing Towards Goal     Problem: Afib: Discharge Outcomes (not in CAT)  Goal: *Hemodynamically stable  Outcome: Progressing Towards Goal  Goal: *Stable cardiac rhythm  Outcome: Progressing Towards Goal  Goal: *Lungs clear or at baseline  Outcome: Progressing Towards Goal  Goal: *Optimal pain control at patient's stated goal  Outcome: Progressing Towards Goal  Goal: *Identifies cardiac risk factors  Outcome: Progressing Towards Goal  Goal: *Verbalizes home exercise program, activity guidelines, cardiac precautions  Outcome: Progressing Towards Goal  Goal: *Verbalizes understanding and describes prescribed diet  Outcome: Progressing Towards Goal  Goal: *Verbalizes understanding and describes medication purposes and frequencies  Outcome: Progressing Towards Goal  Goal: *Anxiety reduced or absent  Outcome: Progressing Towards Goal  Goal: *Understands and describes signs and symptoms to report to providers(Stroke Metric)  Outcome: Progressing Towards Goal  Goal: *Describes follow-up/return visits to physicians  Outcome: Progressing Towards Goal  Goal: *Describes available resources and support systems  Outcome: Progressing Towards Goal  Goal: *Influenza immunization  Outcome: Progressing Towards Goal  Goal: *Pneumococcal immunization  Outcome: Progressing Towards Goal  Goal: *Describes smoking cessation resources  Outcome: Progressing Towards Goal     Problem: Falls - Risk of  Goal: *Absence of Falls  Description  Document Sandi Kamara Fall Risk and appropriate interventions in the flowsheet.   Outcome: Progressing Towards Goal  Note: Fall Risk Interventions:  Mobility Interventions: Bed/chair exit alarm, Communicate number of staff needed for ambulation/transfer         Medication Interventions: Bed/chair exit alarm, Assess postural VS orthostatic hypotension, Patient to call before getting OOB, Teach patient to arise slowly    Elimination Interventions: Bed/chair exit alarm, Call light in reach              Problem: Patient Education: Go to Patient Education Activity  Goal: Patient/Family Education  Outcome: Progressing Towards Goal     Problem: Patient Education: Go to Patient Education Activity  Goal: Patient/Family Education  Outcome: Progressing Towards Goal     Problem: Heart Failure: Day 1  Goal: Off Pathway (Use only if patient is Off Pathway)  Outcome: Progressing Towards Goal  Goal: Activity/Safety  Outcome: Progressing Towards Goal  Goal: Consults, if ordered  Outcome: Progressing Towards Goal  Goal: Diagnostic Test/Procedures  Outcome: Progressing Towards Goal  Goal: Nutrition/Diet  Outcome: Progressing Towards Goal  Goal: Discharge Planning  Outcome: Progressing Towards Goal  Goal: Medications  Outcome: Progressing Towards Goal  Goal: Respiratory  Outcome: Progressing Towards Goal  Goal: Treatments/Interventions/Procedures  Outcome: Progressing Towards Goal  Goal: Psychosocial  Outcome: Progressing Towards Goal  Goal: *Oxygen saturation within defined limits  Outcome: Progressing Towards Goal  Goal: *Hemodynamically stable  Outcome: Progressing Towards Goal  Goal: *Optimal pain control at patient's stated goal  Outcome: Progressing Towards Goal  Goal: *Anxiety reduced or absent  Outcome: Progressing Towards Goal     Problem: Heart Failure: Day 2  Goal: Off Pathway (Use only if patient is Off Pathway)  Outcome: Progressing Towards Goal  Goal: Activity/Safety  Outcome: Progressing Towards Goal  Goal: Consults, if ordered  Outcome: Progressing Towards Goal  Goal: Diagnostic Test/Procedures  Outcome: Progressing Towards Goal  Goal: Nutrition/Diet  Outcome: Progressing Towards Goal  Goal: Discharge Planning  Outcome: Progressing Towards Goal  Goal: Medications  Outcome: Progressing Towards Goal  Goal: Respiratory  Outcome: Progressing Towards Goal  Goal: Treatments/Interventions/Procedures  Outcome: Progressing Towards Goal  Goal: Psychosocial  Outcome: Progressing Towards Goal  Goal: *Oxygen saturation within defined limits  Outcome: Progressing Towards Goal  Goal: *Hemodynamically stable  Outcome: Progressing Towards Goal  Goal: *Optimal pain control at patient's stated goal  Outcome: Progressing Towards Goal  Goal: *Anxiety reduced or absent  Outcome: Progressing Towards Goal  Goal: *Demonstrates progressive activity  Outcome: Progressing Towards Goal     Problem: Heart Failure: Day 3  Goal: Off Pathway (Use only if patient is Off Pathway)  Outcome: Progressing Towards Goal  Goal: Activity/Safety  Outcome: Progressing Towards Goal  Goal: Diagnostic Test/Procedures  Outcome: Progressing Towards Goal  Goal: Nutrition/Diet  Outcome: Progressing Towards Goal  Goal: Discharge Planning  Outcome: Progressing Towards Goal  Goal: Medications  Outcome: Progressing Towards Goal  Goal: Respiratory  Outcome: Progressing Towards Goal  Goal: Treatments/Interventions/Procedures  Outcome: Progressing Towards Goal  Goal: Psychosocial  Outcome: Progressing Towards Goal  Goal: *Oxygen saturation within defined limits  Outcome: Progressing Towards Goal  Goal: *Hemodynamically stable  Outcome: Progressing Towards Goal  Goal: *Optimal pain control at patient's stated goal  Outcome: Progressing Towards Goal  Goal: *Anxiety reduced or absent  Outcome: Progressing Towards Goal  Goal: *Demonstrates progressive activity  Outcome: Progressing Towards Goal     Problem: Heart Failure: Day 4  Goal: Off Pathway (Use only if patient is Off Pathway)  Outcome: Progressing Towards Goal  Goal: Activity/Safety  Outcome: Progressing Towards Goal  Goal: Diagnostic Test/Procedures  Outcome: Progressing Towards Goal  Goal: Nutrition/Diet  Outcome: Progressing Towards Goal  Goal: Discharge Planning  Outcome: Progressing Towards Goal  Goal: Medications  Outcome: Progressing Towards Goal  Goal: Respiratory  Outcome: Progressing Towards Goal  Goal: Treatments/Interventions/Procedures  Outcome: Progressing Towards Goal  Goal: Psychosocial  Outcome: Progressing Towards Goal  Goal: *Oxygen saturation within defined limits  Outcome: Progressing Towards Goal  Goal: *Hemodynamically stable  Outcome: Progressing Towards Goal  Goal: *Optimal pain control at patient's stated goal  Outcome: Progressing Towards Goal  Goal: *Anxiety reduced or absent  Outcome: Progressing Towards Goal  Goal: *Demonstrates progressive activity  Outcome: Progressing Towards Goal     Problem: Heart Failure: Day 5  Goal: Off Pathway (Use only if patient is Off Pathway)  Outcome: Progressing Towards Goal  Goal: Activity/Safety  Outcome: Progressing Towards Goal  Goal: Diagnostic Test/Procedures  Outcome: Progressing Towards Goal  Goal: Nutrition/Diet  Outcome: Progressing Towards Goal  Goal: Discharge Planning  Outcome: Progressing Towards Goal  Goal: Medications  Outcome: Progressing Towards Goal  Goal: Respiratory  Outcome: Progressing Towards Goal  Goal: Treatments/Interventions/Procedures  Outcome: Progressing Towards Goal  Goal: Psychosocial  Outcome: Progressing Towards Goal     Problem: Heart Failure: Discharge Outcomes  Goal: *Demonstrates ability to perform prescribed activity without shortness of breath or discomfort  Outcome: Progressing Towards Goal  Goal: *Left ventricular function assessment completed prior to or during stay, or planned for post-discharge  Outcome: Progressing Towards Goal  Goal: *ACEI prescribed if LVEF less than 40% and no contraindications or ARB prescribed  Outcome: Progressing Towards Goal  Goal: *Verbalizes understanding and describes prescribed diet  Outcome: Progressing Towards Goal  Goal: *Verbalizes understanding/describes prescribed medications  Outcome: Progressing Towards Goal  Goal: *Describes available resources and support systems  Description  (eg: Home Health, Palliative Care, Advanced Medical Directive)  Outcome: Progressing Towards Goal  Goal: *Describes smoking cessation resources  Outcome: Progressing Towards Goal  Goal: *Understands and describes signs and symptoms to report to providers(Stroke Metric)  Outcome: Progressing Towards Goal  Goal: *Describes/verbalizes understanding of follow-up/return appt  Description  (eg: to physicians, diabetes treatment coordinator, and other resources  Outcome: Progressing Towards Goal  Goal: *Describes importance of continuing daily weights and changes to report to physician  Outcome: Progressing Towards Goal

## 2019-12-04 NOTE — PROGRESS NOTES
1850: Patient had 10 beats of VTach. Notified Dr. Kristina Gomez, received verbal order to add magnesium to morning labs. Bedside shift report given to [facility], RN. KUMAR and Kardex discussed.

## 2019-12-04 NOTE — WOUND CARE
Wound care nurse consult from staff nurse stating \"2 blisters in gluteal cleft\". Patient is a mobile 74 y/o CM. He is 5'11\", 348 lbs and admitted for new onset A-fib, chest pain and gross hematuria in setting of metastatic renal CA s/p rt. Nephrectomy. Past Medical History:  
Diagnosis Date  Cancer ([facility] [address]  Diabetes ([facility] [address]  Hypertension Patient has a serous, draining opened blister to left gluteal cleft that has a blanchble base. Blister measured 0.5 x 0.5 x0.1 cm's and most likely caused by friction when moving this large patient. Patient is continent of urine and stool but due to his size he has moisture issues in his deep cleft. Recommend: 
 
Gluteal cleft: daily wash with soap and water, pat dry and apply blue labeled Silicone cream to skin.  
 
Khadijah Singh RN, [employer]

## 2019-12-04 NOTE — PROGRESS NOTES
Hospitalist Progress Note    NAME: Diana Preciado   :  1945   MRN:  [id number]       Assessment / Plan:  New onset A. Fib POA  Chest pain POA  History of CAD status post stent   Patient [facility].  Cardiology preferred medical management of possible acute coronary syndrome vs [facility]  Started on Eliquis by cardiology after hematuria was resolved as YUMIKO score 4  Continue to monitor for any signs of hematuria  Continue BB, statins  [facility], if remain stable and no chest pain or hematuria then may discharge home tomorrow    Gross hematuria - resolved  H/o metastatic renal cell cancer status post right nephrectomy  Started on Eliquis by cardiology now who like to monitor signs of bleed on Eliquis  Urology saw the patient and recommended OP followup Urology at [place]  H&H stable  Continue Proscar and Hytrin    Compensated Chronic Heart Failure with ? EF  Continue torsemide    Diabetes Type 2 insulin  Continue home regimen of Lantus 50 units daily in the morning  SSI lispro     CKD3 with ? Baseline  Cr remain stable     Morbid obesity POA with Body mass index is 48.55 kg/m². Weight loss would be challenging due to nonambulatory status at baseline    Code Status:  full code   Surrogate Decision Maker: Wife Lauren  #[id number]  DVT Prophylaxis: SCDs  GI Prophylaxis: not indicated  Baseline: Patient lives with his wife at home pretty much nonambulatory at baseline, uses electric scooter to move around to get to his appointments , otherwise uses walker at home as per wife    Recommended Disposition: TBD     Subjective: Pt seen and examined at bedside. NAD. Overnight events d/w RN.      Chief Complaint / Reason for Physician Visit: f/u \"hematuria and chest pain\"     Review of Systems:  Symptom Y/N Comments  Symptom Y/N Comments   Fever/Chills n   Chest Pain n    Poor Appetite    Edema     Cough n   Abdominal Pain n    Sputum    Joint Pain     SOB/LOMBARDI y   Pruritis/Rash Nausea/vomit    Tolerating PT/OT     Diarrhea    Tolerating Diet     Constipation    Other       Could NOT obtain due to:      Objective:     VITALS:   Last 24hrs VS reviewed since prior progress note. Most recent are:  Patient Vitals for the past 24 hrs:   Temp Pulse Resp BP SpO2   12/04/19 1508 97.5 °F (36.4 °C) 63 18 126/58 92 %   12/04/19 1055 97.4 °F (36.3 °C) 60 18 146/72 92 %   12/04/19 0752 98 °F (36.7 °C) 73 17 127/62 96 %   12/04/19 0256 97.5 °F (36.4 °C) 66 16 153/61 93 %   12/03/19 2245 98.8 °F (37.1 °C) 75 18 127/70 93 %   12/03/19 1958 97.2 °F (36.2 °C) 66 18 128/66 92 %       Intake/Output Summary (Last 24 hours) at 12/4/2019 1541  Last data filed at 12/4/2019 0648  Gross per 24 hour   Intake 600 ml   Output 1950 ml   Net -1350 ml        PHYSICAL EXAM:  General: WD, WN. Alert, cooperative, no acute distress  , morbidly obese patient. EENT:  EOMI. Anicteric sclerae. MMM  Resp:  Difficult to appreciate breath sounds because of body habitus. Decrease in the lower lungs. No wheezing. CV:  Irregularly irregular normal rate,  No edema  GI:  Soft, Non distended, Non tender.  +Bowel sounds  Neurologic:  Alert and oriented X 3, normal speech,     Reviewed most current lab test results and cultures  YES  Reviewed most current radiology test results   YES  Review and summation of old records today    NO  Reviewed patient's current orders and MAR    YES  PMH/SH reviewed - no change compared to H&P  ________________________________________________________________________  Care Plan discussed with:    Comments   Patient y    Family  y    RN y    Care Manager     Consultant                        Multidiciplinary team rounds were held today with , nursing, pharmacist and clinical coordinator. Patient's plan of care was discussed; medications were reviewed and discharge planning was addressed.      ________________________________________________________________________  Total NON critical care TIME: 25 Minutes    Total CRITICAL CARE TIME Spent:   Minutes non procedure based      Comments   >50% of visit spent in counseling and coordination of care     ________________________________________________________________________  Duane Plane, MD     Procedures: see electronic medical records for all procedures/Xrays and details which were not copied into this note but were reviewed prior to creation of Plan. LABS:  I reviewed today's most current labs and imaging studies.   Pertinent labs include:  Recent Labs     12/03/19 0256   WBC 9.0   HGB 12.3   HCT 38.4        Recent Labs     12/03/19 0256 12/02/19  0338   * 134*   K 3.9 3.8   CL 95* 96*   CO2 27 26   * 239*   BUN 50* 45*   CREA 1.62* 1.61*   CA 8.5 8.7   MG  --  2.2   ALB 2.7*  --    TBILI 1.1*  --    SGOT 61*  --    ALT 54  --        Signed: Duane Plane, MD

## 2019-12-04 NOTE — PROGRESS NOTES
Problem: Mobility Impaired (Adult and Pediatric)  Goal: *Acute Goals and Plan of Care (Insert Text)  Description  FUNCTIONAL STATUS PRIOR TO ADMISSION: Patient was modified independent for transfers to a wheelchair and power wheelchair for functional mobility. He has not ambulated for over a year due to BLE numbness. He requires assist for ADLS. HOME SUPPORT PRIOR TO ADMISSION: The patient lived with his wife who assists with ADLS. Physical Therapy Goals  Initiated 12/3/2019  1. Patient will move from supine to sit and sit to supine , scoot up and down and roll side to side in bed with modified independence within 7 day(s). 2.  Patient will transfer from bed to chair and chair to bed with modified independence using the least restrictive device within 7 day(s). 3.  Patient will perform sit to stand with modified independence within 7 day(s). Note:   PHYSICAL THERAPY TREATMENT  Patient: Florence Iyer (75 y.o. male)  Date: 12/4/2019  Diagnosis: New onset a-fib ([facility] [I48.91]  Hematuria [R31.9]  [facility] cell carcinoma ([facility] [C64.9]   <principal problem not specified>       Precautions:    Chart, physical therapy assessment, plan of care and goals were reviewed. ASSESSMENT  Patient continues with skilled PT services and is progressing towards goals. Patient supine upon arrival, agreeable to PT and cleared by RN for mobility. Bed mobility with mod assist x 1. Good sitting balance EOB but patient fatigues after 4-5 minutes. Assisted patient with LE exercises while EOB. Sit to stand with mod/max assist x 2. Able to stand with walker 5 seconds with min assist then take a few steps from bed to chair with rolling walker and mod assist.  Up in chair at end of session. Recommend HHPT and increased assistance as needed from family.     Current Level of Function Impacting Discharge (mobility/balance): mod/max assist    Other factors to consider for discharge: increased assistance now required for mobility         PLAN :  Patient continues to benefit from skilled intervention to address the above impairments. Continue treatment per established plan of care. to address goals. Recommendation for discharge: (in order for the patient to meet his/her long term goals)  Physical therapy at least 2 days/week in the home AND ensure assist and/or supervision for safety with mobility     This discharge recommendation:  Has been made in collaboration with the attending provider and/or case management    IF patient discharges home will need the following DME: patient owns DME required for discharge       SUBJECTIVE:   Patient stated A great big man came to help me yesterday.     OBJECTIVE DATA SUMMARY:   Critical Behavior:  Neurologic State: Alert  Orientation Level: Oriented X4  Cognition: Follows commands     Functional Mobility Training:  Bed Mobility:  Rolling: Minimum assistance  Supine to Sit: Moderate assistance;Assist x1     Scooting: Contact guard assistance        Transfers:  Sit to Stand: Moderate assistance;Assist x2  Stand to Sit: Moderate assistance;Assist x2        Bed to Chair: Moderate assistance;Assist x2                    Balance:  Sitting: Intact; With support  Standing: Impaired  Standing - Static: Constant support; Fair  Standing - Dynamic : Fair;Constant support  Ambulation/Gait Training:  Distance (ft): 3 Feet (ft)     Ambulation - Level of Assistance: Moderate assistance;Assist x2        Gait Abnormalities: Decreased step clearance; Foot drop        Base of Support: Widened        Step Length: Left shortened;Right shortened                                  Therapeutic Exercises:   LE exercises performed while sitting EOB  Pain Rating:  Some back pain reported    Activity Tolerance:   Fair  Please refer to the flowsheet for vital signs taken during this treatment.     After treatment patient left in no apparent distress:   Sitting in chair, Heels elevated for pressure relief, and Call bell within reach    COMMUNICATION/COLLABORATION:   The patients plan of care was discussed with: Registered Nurse    Nakita Moya, PT   Time Calculation: 24 mins

## 2019-12-05 VITALS
OXYGEN SATURATION: 94 % | TEMPERATURE: 98.1 F | HEIGHT: 71 IN | DIASTOLIC BLOOD PRESSURE: 60 MMHG | WEIGHT: 315 LBS | RESPIRATION RATE: 18 BRPM | HEART RATE: 71 BPM | BODY MASS INDEX: 44.1 KG/M2 | SYSTOLIC BLOOD PRESSURE: 148 MMHG

## 2019-12-05 LAB
ANION GAP SERPL CALC-SCNC: 6 MMOL/L (ref 5–15)
BUN SERPL-MCNC: 45 MG/DL (ref 6–20)
BUN/CREAT SERPL: 31 (ref 12–20)
CALCIUM SERPL-MCNC: 8.9 MG/DL (ref 8.5–10.1)
CHLORIDE SERPL-SCNC: 100 MMOL/L (ref 97–108)
CO2 SERPL-SCNC: 29 MMOL/L (ref 21–32)
CREAT SERPL-MCNC: 1.43 MG/DL (ref 0.7–1.3)
ERYTHROCYTE [DISTWIDTH] IN BLOOD BY AUTOMATED COUNT: 18 % (ref 11.5–14.5)
GLUCOSE BLD STRIP.AUTO-MCNC: 256 MG/DL (ref 65–100)
GLUCOSE BLD STRIP.AUTO-MCNC: 299 MG/DL (ref 65–100)
GLUCOSE BLD STRIP.AUTO-MCNC: 350 MG/DL (ref 65–100)
GLUCOSE SERPL-MCNC: 210 MG/DL (ref 65–100)
HCT VFR BLD AUTO: 37.5 % (ref 36.6–50.3)
HGB BLD-MCNC: 12.1 G/DL (ref 12.1–17)
MAGNESIUM SERPL-MCNC: 2.7 MG/DL (ref 1.6–2.4)
MCH RBC QN AUTO: 29.2 PG (ref 26–34)
MCHC RBC AUTO-ENTMCNC: 32.3 G/DL (ref 30–36.5)
MCV RBC AUTO: 90.6 FL (ref 80–99)
NRBC # BLD: 0 K/UL (ref 0–0.01)
NRBC BLD-RTO: 0 PER 100 WBC
PLATELET # BLD AUTO: 265 K/UL (ref 150–400)
PMV BLD AUTO: 8.6 FL (ref 8.9–12.9)
POTASSIUM SERPL-SCNC: 4 MMOL/L (ref 3.5–5.1)
RBC # BLD AUTO: 4.14 M/UL (ref 4.1–5.7)
SERVICE CMNT-IMP: ABNORMAL
SODIUM SERPL-SCNC: 135 MMOL/L (ref 136–145)
WBC # BLD AUTO: 7.2 K/UL (ref 4.1–11.1)

## 2019-12-05 PROCEDURE — 80048 BASIC METABOLIC PNL TOTAL CA: CPT

## 2019-12-05 PROCEDURE — 74011250637 HC RX REV CODE- 250/637: Performed by: INTERNAL MEDICINE

## 2019-12-05 PROCEDURE — 83735 ASSAY OF MAGNESIUM: CPT

## 2019-12-05 PROCEDURE — 85027 COMPLETE CBC AUTOMATED: CPT

## 2019-12-05 PROCEDURE — 82962 GLUCOSE BLOOD TEST: CPT

## 2019-12-05 PROCEDURE — 36415 COLL VENOUS BLD VENIPUNCTURE: CPT

## 2019-12-05 PROCEDURE — 74011636637 HC RX REV CODE- 636/637: Performed by: INTERNAL MEDICINE

## 2019-12-05 RX ORDER — METOPROLOL TARTRATE 100 MG/1
100 TABLET ORAL 2 TIMES DAILY
Qty: 60 TAB | Refills: 0 | Status: SHIPPED | OUTPATIENT
Start: 2019-12-05 | End: 2020-02-18

## 2019-12-05 RX ADMIN — METOPROLOL TARTRATE 100 MG: 50 TABLET, FILM COATED ORAL at 17:25

## 2019-12-05 RX ADMIN — CASTOR OIL AND BALSAM, PERU: 788; 87 OINTMENT TOPICAL at 09:25

## 2019-12-05 RX ADMIN — DOCUSATE SODIUM 100 MG: 100 CAPSULE, LIQUID FILLED ORAL at 17:25

## 2019-12-05 RX ADMIN — TORSEMIDE 10 MG: 20 TABLET ORAL at 17:25

## 2019-12-05 RX ADMIN — APIXABAN 5 MG: 5 TABLET, FILM COATED ORAL at 09:23

## 2019-12-05 RX ADMIN — DOCUSATE SODIUM 100 MG: 100 CAPSULE, LIQUID FILLED ORAL at 09:23

## 2019-12-05 RX ADMIN — INSULIN GLARGINE 50 UNITS: 100 INJECTION, SOLUTION SUBCUTANEOUS at 09:22

## 2019-12-05 RX ADMIN — TORSEMIDE 10 MG: 20 TABLET ORAL at 09:23

## 2019-12-05 RX ADMIN — INSULIN LISPRO 9 UNITS: 100 INJECTION, SOLUTION INTRAVENOUS; SUBCUTANEOUS at 13:32

## 2019-12-05 RX ADMIN — INSULIN LISPRO 5 UNITS: 100 INJECTION, SOLUTION INTRAVENOUS; SUBCUTANEOUS at 09:24

## 2019-12-05 RX ADMIN — INSULIN LISPRO 25 UNITS: 100 INJECTION, SOLUTION INTRAVENOUS; SUBCUTANEOUS at 09:24

## 2019-12-05 RX ADMIN — Medication 10 ML: at 05:44

## 2019-12-05 RX ADMIN — POTASSIUM BICARBONATE 20 MEQ: 782 TABLET, EFFERVESCENT ORAL at 17:25

## 2019-12-05 RX ADMIN — FEBUXOSTAT 40 MG: 40 TABLET, FILM COATED ORAL at 09:23

## 2019-12-05 RX ADMIN — Medication 10 ML: at 13:33

## 2019-12-05 RX ADMIN — POTASSIUM BICARBONATE 20 MEQ: 782 TABLET, EFFERVESCENT ORAL at 09:23

## 2019-12-05 RX ADMIN — METOPROLOL TARTRATE 100 MG: 50 TABLET, FILM COATED ORAL at 09:23

## 2019-12-05 RX ADMIN — OXYCODONE HYDROCHLORIDE AND ACETAMINOPHEN 1 TABLET: 5; 325 TABLET ORAL at 17:25

## 2019-12-05 NOTE — PROGRESS NOTES
PIA plan: Pt will discharge home today transported by [facility] stretcher at 5:30 pm. PCS, Facesheet, and H&P placed on chart for transport. [facility] had not come to a decision on the transfer and MD has deemed pt medically stable to discharge home. Pt will follow-up with his medical team at the [facility] for further management. Pt will also request home health care services with the [place]. CM offered to arrange [facility] for pt, however he politely declined and stated he will go through the [place] for that. No further concerns indicated at this time. AVS updated. Patient is ready for discharge from Care Managment standpoint. Medicare pt has received, reviewed, and signed 2nd IM letter informing them of their right to appeal the discharge. Signed copy has been placed on pt bedside chart. Care Management Interventions  PCP Verified by CM: Yes  Mode of Transport at Discharge: BLS([facility]  Transition of Care Consult (CM Consult):  Other(home with [facility] follow-up)  MyChart Signup: No  Discharge Durable Medical Equipment: No  Physical Therapy Consult: Yes  Occupational Therapy Consult: No  Speech Therapy Consult: No  Current Support Network: Lives with Spouse, Own Home, Lives with Caregiver(pt and wife live together in single story home with ramp entry)  Confirm Follow Up Transport: [address] discussed with Pt/Family/Caregiver: Yes  Freedom of Choice Offered: Yes(pt signed [facility] transfer preference forms)  Discharge Location  Discharge Placement: Home with family assistance    KRYSTLE Powell  Care Manager  [phone]

## 2019-12-05 NOTE — PROGRESS NOTES
DISCHARGE SUMMARY FROM NURSE    The patient is stable for discharge. I have reviewed the discharge instructions with the patient. The patient verbalized understanding. All questions were fully answered. The  patient denies any complaints. There were no personal belongings, valuables or home medications left at patients bedside,  or safe. Hard scripts and medication handouts were given and reviewed with the patient. Appropriate educational materials and medication side effects teaching were provided. Cardiac monitor and IV line(s) were removed. Patient being transported home by [facility].

## 2019-12-05 NOTE — DIABETES MGMT
[facility]    DTC Progress Note    Recommendations/ Comments: If appropriate, please consider increasing Lantus to 60 units starting tomorrow am, increasing prandial humalog to 30 units ac tid. Please note that pt's BG remain >200 mg/dl fasting and increasing to >300 mg/dl with po intakes. Current hospital DM medication: Lantus 50 units daily am, Lispro 25 units ac BID and correctional insulin - normal sensitivity ac and hs. Chart reviewed on [facility] [place]. Patient is a 75 y.o. male with known DM on U500 Regular insulin - 200 units breakfast and 50 units dinner at home. A1c:   No results found for: HBA1C, HGBE8, UHW9WFAG, PNT7UFDL    Recent Glucose Results:   Lab Results   Component Value Date/Time     (H) 12/05/2019 03:30 AM    GLUCPOC 350 (H) 12/05/2019 10:57 AM    GLUCPOC 256 (H) 12/05/2019 07:50 AM    GLUCPOC 398 (H) 12/04/2019 09:17 PM        Lab Results   Component Value Date/Time    Creatinine 1.43 (H) 12/05/2019 03:30 AM     Estimated Creatinine Clearance: 69.4 mL/min (A) (based on SCr of 1.43 mg/dL (H)). Active Orders   Diet    DIET DIABETIC CONSISTENT CARB Regular; 2 GM NA (House Low NA); AHA-LOW-CHOL FAT        PO intake:   Patient Vitals for the past 72 hrs:   % Diet Eaten   12/05/19 1337 100 %       Will continue to follow as needed.     Thank you  Monique Sprague [address] [place] [address]

## 2019-12-05 NOTE — PROGRESS NOTES
Progress Note      12/4/19         Delayed entry. NAME: Sylvia Diaz   MRN:  [id number]   Admit Diagnosis: New onset a-fib ([facility] [I48.91]; Hematuria [R31.9]; Renal cell carcinoma (HCC) [C64.9]     Assessment:       Atrial Fibrillation of unknown duration   Chest pain   Hx of CAD    Unknown LV function. Remote PCI  at [facility]. center years ago. Renal cell CA , s/p R nephrectomy , now with metastatic disease. Diabetes type 2   Severe Diabetic neupropathy. He is pretty much bed bound, uses a beside commode. Unable to walk with a walker. Hx of CHF   HTN   Hyperlipidemia. Gross hematuria present today . Morbid Obesity . CKD 3     His ChadsVasc score is 4 , giving 4% risk of stroke. This is considered high risk and he should be on anticoag . Per protocol. Per urology the hematuria has resolved       Echo showed normal LV fx and no significant valve disease. Normal size LA / RA       12/4  Has diuresed,  Feeling better. Leg edema resolved. A Fib rate is controlled. No hematuria. 12/5 Cardiac status stable. OK to discharge and follow up with his [facility] cardiologist whenever Hospitalist feels he is ready. Plan: Will start him on Eliquis and see how he tolerates  Continue meds. Plans for transfer to the [address] [place] [address] noted. OK to transfer . F/u with his [facility].         [x]        High complexity decision making was performed    Subjective: Sylvia Diaz denies chest pain, dyspnea. Discussed with RN events overnight.      Patient Active Problem List   Diagnosis Code    Renal cell carcinoma (HCC) C64.9    New onset a-fib ([facility] I48.91    Hematuria R31.9       Review of Systems:    Symptom Y/N Comments  Symptom Y/N Comments   Fever/Chills N   Chest Pain N    Poor Appetite N   Edema N    Cough N   Abdominal Pain N    Sputum N   Joint Pain N    SOB/LOMBARDI N   Pruritis/Rash N    Nausea/vomit N   Tolerating PT/OT Y    Diarrhea N   Tolerating Diet Y    Constipation N Other       Could NOT obtain due to:      Objective:      Physical Exam:    Last 24hrs VS reviewed since prior progress note. Most recent are:    Visit Vitals  /75 (BP 1 Location: Left arm, BP Patient Position: At rest)   Pulse 77   Temp 97.5 °F (36.4 °C)   Resp 14   Ht 5' 11\" (1.803 m)   Wt 157.9 kg (348 lb 1.7 oz)   SpO2 93%   BMI 48.55 kg/m²       Intake/Output Summary (Last 24 hours) at 12/5/2019 1334  Last data filed at 12/5/2019 0734  Gross per 24 hour   Intake 1340 ml   Output 1500 ml   Net -160 ml        General Appearance: Well developed, well nourished, alert & oriented x 3,    no acute distress. Ears/Nose/Mouth/Throat: Hearing grossly normal.  Neck: Supple. Chest: Lungs clear to auscultation bilaterally. Cardiovascular: Regular rate and rhythm, S1S2 normal, no murmur. Abdomen: Soft, non-tender, bowel sounds are active. Extremities: No edema bilaterally. Skin: Warm and dry. PMH/SH reviewed - no change compared to H&P    Data Review    Telemetry: normal sinus rhythm     Lab Data Personally Reviewed:    Recent Labs     12/05/19  0330 12/03/19  0256   WBC 7.2 9.0   HGB 12.1 12.3   HCT 37.5 38.4    249   LABRCNT(INR:3,PTP:3,APTT:3,)  Recent Labs     12/05/19  0330 12/03/19  0256   * 131*   K 4.0 3.9    95*   CO2 29 27   BUN 45* 50*   CREA 1.43* 1.62*   * 199*   CA 8.9 8.5   MG 2.7*  --    LABRCNT(CPK:3,CpKMB:3,ckndx:3,troiq:3)No results found for: CHOL, CHOLX, CHLST, CHOLV, HDL, HDLP, LDL, LDLC, DLDLP, TGLX, TRIGL, TRIGP, CHHD, CHHDXLABRCNT(sgot:3,gpt:3,ap:3,tbiL:3,TP:3,ALB:3,GLOB:3,ggt:3,aml:3,amyp:3,lpse:3,hlpse:3)No results for input(s): PH, PCO2, PO2 in the last 72 hours. No results found for: CHOL, CHOLX, CHLST, CHOLV, HDL, HDLP, LDL, LDLC, DLDLP, TGLX, TRIGL, TRIGP, CHHD, CHHDXMEDTABLETimzenia Abernathy MD  No results for input(s): PH, PCO2, PO2 in the last 72 hours.     Medications Personally Reviewed:    Current Facility-Administered Medications   Medication Dose Route Frequency    potassium bicarb-citric acid (EFFER-K) tablet 20 mEq  20 mEq Oral BID    cabozantinib tab 40 mg (Patient Supplied)  40 mg Oral DAILY    balsam peru-castor oil (VENELEX) ointment   Topical BID    apixaban (ELIQUIS) tablet 5 mg  5 mg Oral Q12H    acetaminophen (TYLENOL) tablet 650 mg  650 mg Oral Q6H PRN    atorvastatin (LIPITOR) tablet 20 mg  20 mg Oral QHS    docusate sodium (COLACE) capsule 100 mg  100 mg Oral BID    febuxostat (ULORIC) tablet 40 mg  40 mg Oral DAILY    finasteride (PROSCAR) tablet 5 mg  5 mg Oral QHS    metoprolol tartrate (LOPRESSOR) tablet 100 mg  100 mg Oral BID    oxyCODONE-acetaminophen (PERCOCET) 5-325 mg per tablet 1 Tab  1 Tab Oral Q4H PRN    senna (SENOKOT) tablet 8.6 mg  1 Tab Oral QHS    terazosin (HYTRIN) capsule 10 mg  10 mg Oral QHS    torsemide (DEMADEX) tablet 10 mg  10 mg Oral BID    sodium chloride (NS) flush 5-40 mL  5-40 mL IntraVENous Q8H    sodium chloride (NS) flush 5-40 mL  5-40 mL IntraVENous PRN    naloxone (NARCAN) injection 0.4 mg  0.4 mg IntraVENous PRN    morphine injection 1 mg  1 mg IntraVENous Q4H PRN    insulin glargine (LANTUS) injection 50 Units  50 Units SubCUTAneous DAILY    insulin lispro (HUMALOG) injection   SubCUTAneous AC&HS    glucose chewable tablet 16 g  4 Tab Oral PRN    dextrose (D50) infusion 12.5-25 g  12.5-25 g IntraVENous PRN    glucagon (GLUCAGEN) injection 1 mg  1 mg IntraMUSCular PRN    insulin lispro (HUMALOG) injection 25 Units  25 Units SubCUTAneous ACB&D         Stephanie Gutierrez MD

## 2019-12-05 NOTE — PROGRESS NOTES
Bedside shift change report GIVEN TO Esther Ceballos RN. Report included the following information SBAR. SIGNIFICANT CHANGES DURING SHIFT:      2130 pt's glood glucose 398. Telehospitalist notified for insulin coverage    2250 still have received no orders to insulin. Telehospitalist messaged again. 2305 orders received for 10 u    CONCERNS TO ADDRESS WITH MD:              [facility] NURSING NOTE   Admission Date 12/1/2019   Admission Diagnosis New onset a-fib ([address] [I48.91]  Hematuria [R31.9]  Renal cell carcinoma (HCC) [C64.9]   Consults IP CONSULT TO CARDIOLOGY  IP CONSULT TO UROLOGY      Cardiac Monitoring [x] Yes [] No      Purposeful Hourly Rounding [x] Yes    Lopez Score Total Score: 3   Lopez score 3 or > [x] Bed Alarm [] Avasys [] 1:1 sitter [] Patient refused (Signed refusal form in chart)   Jose Roberto Score Jose Roberto Score: 15   Jose Roberto score 14 or < [] PMT consult [] Wound Care consult    []  Specialty bed  [] Nutrition consult      Influenza Vaccine Received Flu Vaccine for Current Season (usually Sept-March): Yes           Oxygen needs? [x] Room air Oxygen @  []1L    []2L    []3L   []4L    []5L   []6L via  NC   Chronic home O2 use? [] Yes [] No  Perform O2 challenge test and document in progress note using smartphrase (.Homeoxygen)      Last bowel movement Last Bowel Movement Date: 12/03/19      Urinary Catheter             LDAs               Peripheral IV 12/01/19 Left Hand (Active)   Site Assessment Clean, dry, & intact 12/4/2019  7:36 PM   Phlebitis Assessment 0 12/4/2019  7:36 PM   Infiltration Assessment 0 12/4/2019  7:36 PM   Dressing Status Clean, dry, & intact 12/4/2019  7:36 PM   Dressing Type Transparent 12/4/2019  7:36 PM   Hub Color/Line Status Pink 12/4/2019  7:36 PM   Action Taken Open ports on tubing capped 12/2/2019 12:00 PM   Alcohol Cap Used Yes 12/2/2019 12:00 PM                         Readmission Risk Assessment Tool Score Low Risk            7       Total Score        2 .  Living with Significant Other. Assisted Living. LTAC. SNF. or   Rehab    5 Pt.  Coverage (Medicare=5 , Medicaid, or Self-Pay=4)        Criteria that do not apply:    Has Seen PCP in Last 6 Months (Yes=3, No=0)    Patient Length of Stay (>5 days = 3)    IP Visits Last 12 Months (1-3=4, 4=9, >4=11)    Charlson Comorbidity Score (Age + Comorbid Conditions)       Expected Length of Stay 2d 12h   Actual Length of Stay 3

## 2019-12-05 NOTE — PROGRESS NOTES
Problem: Pressure Injury - Risk of  Goal: *Prevention of pressure injury  Description  Document Jose Roberto Scale and appropriate interventions in the flowsheet. Outcome: Progressing Towards Goal  Note: Pressure Injury Interventions:  Sensory Interventions: Assess changes in LOC    Moisture Interventions: Absorbent underpads    Activity Interventions: Pressure redistribution bed/mattress(bed type)    Mobility Interventions: Pressure redistribution bed/mattress (bed type)    Nutrition Interventions: Document food/fluid/supplement intake    Friction and Shear Interventions: Apply protective barrier, creams and emollients                Problem: Afib Pathway: Day 3  Goal: Activity/Safety  Outcome: Progressing Towards Goal  Goal: Diagnostic Test/Procedures  Outcome: Progressing Towards Goal  Goal: Discharge Planning  Outcome: Progressing Towards Goal  Goal: Medications  Outcome: Progressing Towards Goal  Goal: Respiratory  Outcome: Progressing Towards Goal     Problem: Falls - Risk of  Goal: *Absence of Falls  Description  Document Hermelinda [place] Fall Risk and appropriate interventions in the flowsheet.   Outcome: Progressing Towards Goal  Note: Fall Risk Interventions:  Mobility Interventions: Bed/chair exit alarm         Medication Interventions: Bed/chair exit alarm    Elimination Interventions: Bed/chair exit alarm, Call light in reach

## 2019-12-05 NOTE — PROGRESS NOTES
Progress Note      12/4/19         Delayed entry. NAME: Sara Gu   MRN:  [id number]   Admit Diagnosis: New onset a-fib ([facility] [I48.91]; Hematuria [R31.9]; Renal cell carcinoma (HCC) [C64.9]     Assessment:       Atrial Fibrillation of unknown duration   Chest pain   Hx of CAD    Unknown LV function. Remote PCI  at [facility]. center years ago. Renal cell CA , s/p R nephrectomy , now with metastatic disease. Diabetes type 2   Severe Diabetic neupropathy. He is pretty much bed bound, uses a beside commode. Unable to walk with a walker. Hx of CHF   HTN   Hyperlipidemia. Gross hematuria present today . Morbid Obesity . CKD 3     His ChadsVasc score is 4 , giving 4% risk of stroke. This is considered high risk and he should be on anticoag . Per protocol. Per urology the hematuria has resolved       Echo showed normal LV fx and no significant valve disease. Normal size LA / RA       12/4  Has diuresed,  Feeling better. Leg edema resolved. A Fib rate is controlled. No hematuria. Plan: Will start him on Eliquis and see how he tolerates  Continue meds. Plans for transfer to the [facility] noted. OK to transfer . F/u with his [address]         [x]        High complexity decision making was performed    Subjective: Sara Gu denies chest pain, dyspnea. Discussed with RN events overnight. Patient Active Problem List   Diagnosis Code    Renal cell carcinoma (HCC) C64.9    New onset a-fib ([facility] I48.91    Hematuria R31.9       Review of Systems:    Symptom Y/N Comments  Symptom Y/N Comments   Fever/Chills N   Chest Pain N    Poor Appetite N   Edema N    Cough N   Abdominal Pain N    Sputum N   Joint Pain N    SOB/LOMBARDI N   Pruritis/Rash N    Nausea/vomit N   Tolerating PT/OT Y    Diarrhea N   Tolerating Diet Y    Constipation N   Other       Could NOT obtain due to:      Objective:      Physical Exam:    Last 24hrs VS reviewed since prior progress note. Most recent are:    Visit Vitals  /73 (BP 1 Location: Left arm, BP Patient Position: At rest)   Pulse 74   Temp 98.1 °F (36.7 °C)   Resp 16   Ht 5' 11\" (1.803 m)   Wt 157.9 kg (348 lb 1.7 oz)   SpO2 93%   BMI 48.55 kg/m²       Intake/Output Summary (Last 24 hours) at 12/5/2019 0852  Last data filed at 12/5/2019 0734  Gross per 24 hour   Intake 1580 ml   Output 2100 ml   Net -520 ml        General Appearance: Well developed, well nourished, alert & oriented x 3,    no acute distress. Ears/Nose/Mouth/Throat: Hearing grossly normal.  Neck: Supple. Chest: Lungs clear to auscultation bilaterally. Cardiovascular: Regular rate and rhythm, S1S2 normal, no murmur. Abdomen: Soft, non-tender, bowel sounds are active. Extremities: No edema bilaterally. Skin: Warm and dry. PMH/SH reviewed - no change compared to H&P    Data Review    Telemetry: normal sinus rhythm     Lab Data Personally Reviewed:    Recent Labs     12/05/19  0330 12/03/19  0256   WBC 7.2 9.0   HGB 12.1 12.3   HCT 37.5 38.4    249   LABRCNT(INR:3,PTP:3,APTT:3,)  Recent Labs     12/05/19  0330 12/03/19  0256   * 131*   K 4.0 3.9    95*   CO2 29 27   BUN 45* 50*   CREA 1.43* 1.62*   * 199*   CA 8.9 8.5   MG 2.7*  --    LABRCNT(CPK:3,CpKMB:3,ckndx:3,troiq:3)No results found for: CHOL, CHOLX, CHLST, CHOLV, HDL, HDLP, LDL, LDLC, DLDLP, TGLX, TRIGL, TRIGP, CHHD, CHHDXLABRCNT(sgot:3,gpt:3,ap:3,tbiL:3,TP:3,ALB:3,GLOB:3,ggt:3,aml:3,amyp:3,lpse:3,hlpse:3)No results for input(s): PH, PCO2, PO2 in the last 72 hours. No results found for: CHOL, CHOLX, CHLST, CHOLV, HDL, HDLP, LDL, LDLC, DLDLP, TGLX, TRIGL, TRIGP, CHHD, CHHDXMEDTABLETimzenia Dorado MD  No results for input(s): PH, PCO2, PO2 in the last 72 hours.     Medications Personally Reviewed:    Current Facility-Administered Medications   Medication Dose Route Frequency    potassium bicarb-citric acid (EFFER-K) tablet 20 mEq  20 mEq Oral BID    cabozantinib tab 40 mg (Patient Supplied)  40 mg Oral DAILY    balsam peru-castor oil (VENELEX) ointment   Topical BID    apixaban (ELIQUIS) tablet 5 mg  5 mg Oral Q12H    acetaminophen (TYLENOL) tablet 650 mg  650 mg Oral Q6H PRN    atorvastatin (LIPITOR) tablet 20 mg  20 mg Oral QHS    docusate sodium (COLACE) capsule 100 mg  100 mg Oral BID    febuxostat (ULORIC) tablet 40 mg  40 mg Oral DAILY    finasteride (PROSCAR) tablet 5 mg  5 mg Oral QHS    metoprolol tartrate (LOPRESSOR) tablet 100 mg  100 mg Oral BID    oxyCODONE-acetaminophen (PERCOCET) 5-325 mg per tablet 1 Tab  1 Tab Oral Q4H PRN    senna (SENOKOT) tablet 8.6 mg  1 Tab Oral QHS    terazosin (HYTRIN) capsule 10 mg  10 mg Oral QHS    torsemide (DEMADEX) tablet 10 mg  10 mg Oral BID    sodium chloride (NS) flush 5-40 mL  5-40 mL IntraVENous Q8H    sodium chloride (NS) flush 5-40 mL  5-40 mL IntraVENous PRN    naloxone (NARCAN) injection 0.4 mg  0.4 mg IntraVENous PRN    morphine injection 1 mg  1 mg IntraVENous Q4H PRN    insulin glargine (LANTUS) injection 50 Units  50 Units SubCUTAneous DAILY    insulin lispro (HUMALOG) injection   SubCUTAneous AC&HS    glucose chewable tablet 16 g  4 Tab Oral PRN    dextrose (D50) infusion 12.5-25 g  12.5-25 g IntraVENous PRN    glucagon (GLUCAGEN) injection 1 mg  1 mg IntraMUSCular PRN    insulin lispro (HUMALOG) injection 25 Units  25 Units SubCUTAneous ACB&D         Jelena Fernandez MD

## 2019-12-05 NOTE — DISCHARGE SUMMARY
Hospitalist Discharge Summary     Patient ID:  Vicenta Villela  [id number]  75 y.o.  1945 12/1/2019    PCP on record: Other, MD Brittany    Admit date: 12/1/2019  Discharge date and time: 12/5/2019    DISCHARGE DIAGNOSIS:  New onset A. Fib   Chest pain   CAD status post stent   Gross hematuria   Metastatic renal cell cancer status post right nephrectomy  Chronic Heart Failure  Diabetes Type 2 insulin  CKD3   Morbid obesity POA with Body mass index is 48.55 kg/m²    CONSULTATIONS:  IP CONSULT TO CARDIOLOGY  IP CONSULT TO UROLOGY    Excerpted HPI from H&P of Kate Hernandez MD:  Fani Dickerson is a 75 y.o.  male who presents with with above complaints from home via EMS with wife.     Patient presents with chief complaint of sudden onset chest pain since 4 in the morning, which initially responded to nitroglycerin x2 as per patient but persisted through the morning. History of associated palpitations,  History of right renal cell carcinoma/status post nephrectomy-on chemotherapy currently at [place]  History of CAD status post stents as per patient, is on diuretic torsemide- ?  CHF likely systolic     Patient was found to have new onset A. fib on EKG/monitor in the ER which was relatively rate controlled in the 90s-100s/min, unremarkable CTA chest abdomen and pelvis for any other acute abnormality. Patient was found to have JORGE/questionable CKD with hypokalemia and elevated LFTs with hematuria and ER.     We were asked to admit for work up and evaluation of the above problems. ______________________________________________________________________  DISCHARGE SUMMARY/HOSPITAL COURSE:  for full details see H&P, daily progress notes, labs, consult notes. New onset A.  Fib POA  Chest pain POA  History of CAD status post stent   Patient usually goes to [facility].  Cardiology preferred medical management of possible acute coronary syndrome vs [facility]  Started on Eliquis by cardiology and held ASA by Dr Jim Chaudhari after hematuria was resolved as YUMIKO score 4  No signs of any more hematuria for 48 hours after starting Eliquis  Continue BB, statins  Pt without CP or hematuria for > 48 hours and medically ready to be discharge. [facility]. Pt recommended to [address] if same happens again as limitation as consultant wants his physicians to take care of his CAD and Hematuria.     Gross hematuria - resolved  H/o metastatic renal cell cancer status post right nephrectomy  Started on Eliquis by cardiology now who like to monitor signs of bleed on Eliquis  Urology saw the patient and recommended OP followup Urology at [place]  H&H stable  No more bleeding despite cardiology starting Eliquis  Continue Proscar and Hytrin  Pt recommended to [address] if happens again as consultants wants his procedures to be at [address] [place] [address] Drive Failure with ? EF  Continue torsemide     Diabetes Type 2 insulin  Continue home regimen of Lantus 50 units daily in the morning  SSI lispro      CKD3 with ? Baseline  Cr remain stable      Morbid obesity POA with Body mass index is 48.55 kg/m². Weight loss would be challenging due to nonambulatory status at baseline   _______________________________________________________________________  Patient seen and examined by me on discharge day. Pertinent Findings:  Gen:    Not in distress  Chest: Clear lungs  CVS:   Regular rhythm. No edema  Abd:  Soft, not distended, not tender  Neuro:  Alert, Non Focal  _______________________________________________________________________  DISCHARGE MEDICATIONS:   Current Discharge Medication List      START taking these medications    Details   apixaban (ELIQUIS) 5 mg tablet Take 1 Tab by mouth every twelve (12) hours. Qty: 60 Tab, Refills: 0         CONTINUE these medications which have CHANGED    Details   metoprolol tartrate (LOPRESSOR) 100 mg IR tablet Take 1 Tab by mouth two (2) times a day.   Qty: 60 Tab, Refills: 0         CONTINUE these medications which have NOT CHANGED    Details   pyridoxine, vitamin B6, (VITAMIN B-6) 50 mg cap Take 1 Cap by mouth daily. cabozantinib 40 mg tab Take 40 mg by mouth daily. raNITIdine (ZANTAC) 150 mg tablet Take 150 mg by mouth two (2) times a day. cyanocobalamin (VITAMIN B12) 250 mcg tablet Take 1,000 mcg by mouth two (2) times a day. cholecalciferol (VITAMIN D3) 25 mcg (1,000 unit) cap Take 2,000 Units by mouth daily. multivitamin (ONE A DAY) tablet Take 1 Tab by mouth daily. nitroglycerin (NITROSTAT) 0.4 mg SL tablet 0.4 mg by SubLINGual route every five (5) minutes as needed for Chest Pain. Up to 3 doses. folic acid (FOLVITE) 1 mg tablet Take 3 mg by mouth nightly. oxyCODONE-acetaminophen (PERCOCET) 5-325 mg per tablet Take 1 Tab by mouth every six (6) hours as needed for Pain.      torsemide (DEMADEX) 20 mg tablet Take 60 mg by mouth two (2) times a day. finasteride (PROSCAR) 5 mg tablet Take 5 mg by mouth nightly. terazosin (HYTRIN) 10 mg capsule Take 10 mg by mouth nightly. atorvastatin (LIPITOR) 80 mg tablet Take 40 mg by mouth nightly. docusate sodium (COLACE) 100 mg capsule Take 100 mg by mouth three (3) times daily as needed. sennosides (SENNA) 8.6 mg cap Take 1 Cap by mouth nightly. febuxostat (ULORIC) 40 mg tab tablet Take 80 mg by mouth daily. potassium chloride SR (KLOR-CON 10) 10 mEq tablet Take 30 mEq by mouth two (2) times a day. insulin glargine (LANTUS,BASAGLAR) 100 unit/mL (3 mL) inpn 50 Units by SubCUTAneous route daily. !! insulin CONCENTRATED regular (HUMULIN R U-500) 500 unit/mL soln 200 Units by SubCUTAneous route Daily (before breakfast). !! insulin CONCENTRATED regular (HUMULIN R U-500) 500 unit/mL soln 50 Units by SubCUTAneous route Daily (before dinner). !! - Potential duplicate medications found. Please discuss with provider.       STOP taking these medications       chlorthalidone (HYGROTEN) 25 mg tablet Comments:   Reason for Stopping:         aspirin delayed-release 81 mg tablet Comments:   Reason for Stopping: Follow-up Information     Follow up With Specialties Details Why Contact Info    Patient's Own Medication is located in the Patient's:  [address]   Please follow-up with your medical team at the [facility] as soon as possible. [address] [place], [place] [id number]  [address] [place] [address] can arrange home health care for you. Please discuss this with your medical team and request this service.      [facility]            ________________________________________________________________    Risk of deterioration: Moderate    Condition at Discharge:  Stable  __________________________________________________________________    Disposition  Home with family, no needs    ____________________________________________________________________    Code Status: Full Code  ___________________________________________________________________      Total time in minutes spent coordinating this discharge (includes going over instructions, follow-up, prescriptions, and preparing report for sign off to her PCP) :  35 minutes    Signed:  Satinder Baez MD

## 2019-12-05 NOTE — PROGRESS NOTES
Problem: Pressure Injury - Risk of  Goal: *Prevention of pressure injury  Description  Document Jose Roberto Scale and appropriate interventions in the flowsheet. Outcome: Progressing Towards Goal  Note: Pressure Injury Interventions:  Sensory Interventions: Assess changes in LOC, Assess need for specialty bed, Avoid rigorous massage over bony prominences, Chair cushion, Check visual cues for pain, Discuss PT/OT consult with provider, Float heels, Keep linens dry and wrinkle-free, Maintain/enhance activity level, Minimize linen layers    Moisture Interventions: Absorbent underpads, Apply protective barrier, creams and emollients, Assess need for specialty bed, Check for incontinence Q2 hours and as needed, Offer toileting Q_hr, Moisture barrier, Minimize layers, Limit adult briefs, Maintain skin hydration (lotion/cream)    Activity Interventions: Assess need for specialty bed, Chair cushion, Increase time out of bed, Pressure redistribution bed/mattress(bed type), PT/OT evaluation    Mobility Interventions: Chair cushion, Float heels, Assess need for specialty bed, HOB 30 degrees or less, Pressure redistribution bed/mattress (bed type), PT/OT evaluation, Turn and reposition approx.  every two hours(pillow and wedges)    Nutrition Interventions: Document food/fluid/supplement intake    Friction and Shear Interventions: Apply protective barrier, creams and emollients, Feet elevated on foot rest, Foam dressings/transparent film/skin sealants, HOB 30 degrees or less, Lift sheet, Lift team/patient mobility team, Minimize layers                Problem: Patient Education: Go to Patient Education Activity  Goal: Patient/Family Education  Outcome: Progressing Towards Goal

## 2019-12-05 NOTE — DISCHARGE INSTRUCTIONS
HOSPITALIST DISCHARGE INSTRUCTIONS    NAME: Soumya José   :  1945   MRN:  [id number]     Date/Time:  2019 2:53 PM    ADMIT DATE: 2019     DISCHARGE DATE: 2019     DISCHARGE DIAGNOSIS:  New onset A. Fib   Chest pain   CAD status post stent   Gross hematuria   Metastatic renal cell cancer status post right nephrectomy  Chronic Heart Failure  Diabetes Type 2 insulin  CKD3     MEDICATIONS:  · It is important that you take the medication exactly as they are prescribed. · Keep your medication in the bottles provided by the pharmacist and keep a list of the medication names, dosages, and times to be taken in your wallet. · Do not take other medications without consulting your doctor. Pain Management: per above medications    What to do at Home    Recommended diet:  Resume previous diet    Recommended activity: Activity as tolerated    If you have questions regarding the hospital related prescriptions or hospital related issues [phone]. If you experience any of the following symptoms then please call your primary care physician or return to the emergency room if you cannot get hold of your doctor:  Fever, chills, nausea, vomiting, diarrhea, change in mentation, falling, bleeding, shortness of breath      Information obtained by :  I understand that if any problems occur once I am at home I am to contact my physician. I understand and acknowledge receipt of the instructions indicated above.                                                                                                                                            Physician's or R.N.'s Signature                                                                  Date/Time                                                                                                                                              Patient or Representative Signature Date/Time

## 2019-12-05 NOTE — PROGRESS NOTES
Bedside shift change report given to Uriel Kathleen (oncoming nurse) by Selin Nixon (offgoing nurse). Report included the following information SBAR.     1750 - Patient states he has his insulin he can take at home before he eats dinner; he has not eaten anything at this time.

## 2020-02-05 ENCOUNTER — APPOINTMENT (OUTPATIENT)
Dept: GENERAL RADIOLOGY | Age: 75
DRG: 291 | End: 2020-02-05
Attending: EMERGENCY MEDICINE
Payer: MEDICARE

## 2020-02-05 ENCOUNTER — HOSPITAL ENCOUNTER (INPATIENT)
Age: 75
LOS: 12 days | Discharge: HOSPICE | DRG: 291 | End: 2020-02-18
Attending: EMERGENCY MEDICINE | Admitting: INTERNAL MEDICINE
Payer: MEDICARE

## 2020-02-05 DIAGNOSIS — R53.81 DEBILITY: ICD-10-CM

## 2020-02-05 DIAGNOSIS — I50.1 PULMONARY EDEMA CARDIAC CAUSE (HCC): ICD-10-CM

## 2020-02-05 DIAGNOSIS — N18.9 ACUTE ON CHRONIC RENAL INSUFFICIENCY: ICD-10-CM

## 2020-02-05 DIAGNOSIS — R54 FRAILTY: ICD-10-CM

## 2020-02-05 DIAGNOSIS — Z71.89 DNR (DO NOT RESUSCITATE) DISCUSSION: ICD-10-CM

## 2020-02-05 DIAGNOSIS — G89.29 OTHER CHRONIC PAIN: ICD-10-CM

## 2020-02-05 DIAGNOSIS — I50.9 ACUTE ON CHRONIC CONGESTIVE HEART FAILURE, UNSPECIFIED HEART FAILURE TYPE (HCC): Primary | ICD-10-CM

## 2020-02-05 DIAGNOSIS — R09.02 HYPOXIA: ICD-10-CM

## 2020-02-05 DIAGNOSIS — R05.9 COUGH: ICD-10-CM

## 2020-02-05 DIAGNOSIS — R60.0 LOCALIZED EDEMA: ICD-10-CM

## 2020-02-05 DIAGNOSIS — N28.9 ACUTE ON CHRONIC RENAL INSUFFICIENCY: ICD-10-CM

## 2020-02-05 DIAGNOSIS — R63.0 ANOREXIA: ICD-10-CM

## 2020-02-05 LAB
ALBUMIN SERPL-MCNC: 3 G/DL (ref 3.5–5)
ALBUMIN/GLOB SERPL: 0.8 {RATIO} (ref 1.1–2.2)
ALP SERPL-CCNC: 602 U/L (ref 45–117)
ALT SERPL-CCNC: 24 U/L (ref 12–78)
ANION GAP SERPL CALC-SCNC: 6 MMOL/L (ref 5–15)
AST SERPL-CCNC: 37 U/L (ref 15–37)
BASOPHILS # BLD: 0.1 K/UL (ref 0–0.1)
BASOPHILS NFR BLD: 1 % (ref 0–1)
BILIRUB SERPL-MCNC: 0.6 MG/DL (ref 0.2–1)
BNP SERPL-MCNC: 3089 PG/ML
BUN SERPL-MCNC: 63 MG/DL (ref 6–20)
BUN/CREAT SERPL: 35 (ref 12–20)
CALCIUM SERPL-MCNC: 9 MG/DL (ref 8.5–10.1)
CHLORIDE SERPL-SCNC: 95 MMOL/L (ref 97–108)
CK SERPL-CCNC: 96 U/L (ref 39–308)
CO2 SERPL-SCNC: 31 MMOL/L (ref 21–32)
CREAT SERPL-MCNC: 1.82 MG/DL (ref 0.7–1.3)
DIFFERENTIAL METHOD BLD: ABNORMAL
EOSINOPHIL # BLD: 0.3 K/UL (ref 0–0.4)
EOSINOPHIL NFR BLD: 4 % (ref 0–7)
ERYTHROCYTE [DISTWIDTH] IN BLOOD BY AUTOMATED COUNT: 18.9 % (ref 11.5–14.5)
GLOBULIN SER CALC-MCNC: 3.6 G/DL (ref 2–4)
GLUCOSE SERPL-MCNC: 216 MG/DL (ref 65–100)
HCT VFR BLD AUTO: 33.6 % (ref 36.6–50.3)
HGB BLD-MCNC: 10.2 G/DL (ref 12.1–17)
IMM GRANULOCYTES # BLD AUTO: 0.1 K/UL (ref 0–0.04)
IMM GRANULOCYTES NFR BLD AUTO: 1 % (ref 0–0.5)
LACTATE SERPL-SCNC: 1.8 MMOL/L (ref 0.4–2)
LYMPHOCYTES # BLD: 1.3 K/UL (ref 0.8–3.5)
LYMPHOCYTES NFR BLD: 16 % (ref 12–49)
MCH RBC QN AUTO: 28.9 PG (ref 26–34)
MCHC RBC AUTO-ENTMCNC: 30.4 G/DL (ref 30–36.5)
MCV RBC AUTO: 95.2 FL (ref 80–99)
MONOCYTES # BLD: 0.8 K/UL (ref 0–1)
MONOCYTES NFR BLD: 10 % (ref 5–13)
NEUTS SEG # BLD: 5.7 K/UL (ref 1.8–8)
NEUTS SEG NFR BLD: 68 % (ref 32–75)
NRBC # BLD: 0.08 K/UL (ref 0–0.01)
NRBC BLD-RTO: 1 PER 100 WBC
PLATELET # BLD AUTO: 292 K/UL (ref 150–400)
PMV BLD AUTO: 9 FL (ref 8.9–12.9)
POTASSIUM SERPL-SCNC: 5.1 MMOL/L (ref 3.5–5.1)
PROT SERPL-MCNC: 6.6 G/DL (ref 6.4–8.2)
RBC # BLD AUTO: 3.53 M/UL (ref 4.1–5.7)
SODIUM SERPL-SCNC: 132 MMOL/L (ref 136–145)
TROPONIN I BLD-MCNC: <0.04 NG/ML (ref 0–0.08)
TROPONIN I SERPL-MCNC: <0.05 NG/ML
WBC # BLD AUTO: 8.4 K/UL (ref 4.1–11.1)

## 2020-02-05 PROCEDURE — 85025 COMPLETE CBC W/AUTO DIFF WBC: CPT

## 2020-02-05 PROCEDURE — 96374 THER/PROPH/DIAG INJ IV PUSH: CPT

## 2020-02-05 PROCEDURE — 87040 BLOOD CULTURE FOR BACTERIA: CPT

## 2020-02-05 PROCEDURE — 74011000250 HC RX REV CODE- 250: Performed by: EMERGENCY MEDICINE

## 2020-02-05 PROCEDURE — 94660 CPAP INITIATION&MGMT: CPT

## 2020-02-05 PROCEDURE — 82550 ASSAY OF CK (CPK): CPT

## 2020-02-05 PROCEDURE — 36415 COLL VENOUS BLD VENIPUNCTURE: CPT

## 2020-02-05 PROCEDURE — 51702 INSERT TEMP BLADDER CATH: CPT

## 2020-02-05 PROCEDURE — 83605 ASSAY OF LACTIC ACID: CPT

## 2020-02-05 PROCEDURE — 80053 COMPREHEN METABOLIC PANEL: CPT

## 2020-02-05 PROCEDURE — 83880 ASSAY OF NATRIURETIC PEPTIDE: CPT

## 2020-02-05 PROCEDURE — 93005 ELECTROCARDIOGRAM TRACING: CPT

## 2020-02-05 PROCEDURE — 71045 X-RAY EXAM CHEST 1 VIEW: CPT

## 2020-02-05 PROCEDURE — 84484 ASSAY OF TROPONIN QUANT: CPT

## 2020-02-05 PROCEDURE — 99285 EMERGENCY DEPT VISIT HI MDM: CPT

## 2020-02-05 RX ORDER — BUMETANIDE 0.25 MG/ML
1 INJECTION INTRAMUSCULAR; INTRAVENOUS
Status: COMPLETED | OUTPATIENT
Start: 2020-02-05 | End: 2020-02-05

## 2020-02-05 RX ADMIN — BUMETANIDE 1 MG: 0.25 INJECTION INTRAMUSCULAR; INTRAVENOUS at 23:48

## 2020-02-06 ENCOUNTER — PATIENT OUTREACH (OUTPATIENT)
Dept: CASE MANAGEMENT | Age: 75
End: 2020-02-06

## 2020-02-06 ENCOUNTER — APPOINTMENT (OUTPATIENT)
Dept: CT IMAGING | Age: 75
DRG: 291 | End: 2020-02-06
Attending: INTERNAL MEDICINE
Payer: MEDICARE

## 2020-02-06 PROBLEM — G89.29 CHRONIC PAIN: Status: ACTIVE | Noted: 2020-02-06

## 2020-02-06 PROBLEM — E78.5 HYPERLIPIDEMIA: Status: ACTIVE | Noted: 2020-02-06

## 2020-02-06 PROBLEM — R60.1 ANASARCA: Status: ACTIVE | Noted: 2020-02-06

## 2020-02-06 PROBLEM — I10 HTN (HYPERTENSION): Status: ACTIVE | Noted: 2020-02-06

## 2020-02-06 PROBLEM — I48.0 PAROXYSMAL A-FIB (HCC): Status: ACTIVE | Noted: 2019-12-01

## 2020-02-06 PROBLEM — I50.33 ACUTE ON CHRONIC DIASTOLIC HEART FAILURE (HCC): Status: ACTIVE | Noted: 2020-02-06

## 2020-02-06 PROBLEM — E11.9 TYPE 2 DIABETES MELLITUS (HCC): Status: ACTIVE | Noted: 2020-02-06

## 2020-02-06 PROBLEM — N40.0 BPH (BENIGN PROSTATIC HYPERPLASIA): Status: ACTIVE | Noted: 2020-02-06

## 2020-02-06 PROBLEM — J96.21 ACUTE ON CHRONIC RESPIRATORY FAILURE WITH HYPOXIA (HCC): Status: ACTIVE | Noted: 2020-02-06

## 2020-02-06 PROBLEM — I25.10 CAD (CORONARY ARTERY DISEASE): Status: ACTIVE | Noted: 2020-02-06

## 2020-02-06 PROBLEM — M10.9 GOUT: Status: ACTIVE | Noted: 2020-02-06

## 2020-02-06 LAB
ALBUMIN SERPL-MCNC: 2.8 G/DL (ref 3.5–5)
ALBUMIN/GLOB SERPL: 0.8 {RATIO} (ref 1.1–2.2)
ALP SERPL-CCNC: 548 U/L (ref 45–117)
ALT SERPL-CCNC: 22 U/L (ref 12–78)
ANION GAP SERPL CALC-SCNC: 7 MMOL/L (ref 5–15)
APPEARANCE UR: CLEAR
AST SERPL-CCNC: 33 U/L (ref 15–37)
ATRIAL RATE: 30 BPM
BACTERIA URNS QL MICRO: NEGATIVE /HPF
BASOPHILS # BLD: 0.1 K/UL (ref 0–0.1)
BASOPHILS NFR BLD: 1 % (ref 0–1)
BILIRUB SERPL-MCNC: 0.6 MG/DL (ref 0.2–1)
BILIRUB UR QL: NEGATIVE
BUN SERPL-MCNC: 65 MG/DL (ref 6–20)
BUN/CREAT SERPL: 38 (ref 12–20)
CALCIUM SERPL-MCNC: 8.9 MG/DL (ref 8.5–10.1)
CALCULATED R AXIS, ECG10: -23 DEGREES
CALCULATED T AXIS, ECG11: 54 DEGREES
CHLORIDE SERPL-SCNC: 95 MMOL/L (ref 97–108)
CO2 SERPL-SCNC: 30 MMOL/L (ref 21–32)
COLOR UR: ABNORMAL
CREAT SERPL-MCNC: 1.73 MG/DL (ref 0.7–1.3)
DIAGNOSIS, 93000: NORMAL
DIFFERENTIAL METHOD BLD: ABNORMAL
EOSINOPHIL # BLD: 0.2 K/UL (ref 0–0.4)
EOSINOPHIL NFR BLD: 3 % (ref 0–7)
EPITH CASTS URNS QL MICRO: ABNORMAL /LPF
ERYTHROCYTE [DISTWIDTH] IN BLOOD BY AUTOMATED COUNT: 18.6 % (ref 11.5–14.5)
GLOBULIN SER CALC-MCNC: 3.4 G/DL (ref 2–4)
GLUCOSE BLD STRIP.AUTO-MCNC: 248 MG/DL (ref 65–100)
GLUCOSE BLD STRIP.AUTO-MCNC: 249 MG/DL (ref 65–100)
GLUCOSE BLD STRIP.AUTO-MCNC: 253 MG/DL (ref 65–100)
GLUCOSE BLD STRIP.AUTO-MCNC: 279 MG/DL (ref 65–100)
GLUCOSE SERPL-MCNC: 217 MG/DL (ref 65–100)
GLUCOSE UR STRIP.AUTO-MCNC: NEGATIVE MG/DL
HCT VFR BLD AUTO: 31.5 % (ref 36.6–50.3)
HCT VFR BLD AUTO: 31.9 % (ref 36.6–50.3)
HGB BLD-MCNC: 9.4 G/DL (ref 12.1–17)
HGB BLD-MCNC: 9.5 G/DL (ref 12.1–17)
HGB UR QL STRIP: ABNORMAL
HYALINE CASTS URNS QL MICRO: ABNORMAL /LPF
IMM GRANULOCYTES # BLD AUTO: 0.1 K/UL (ref 0–0.04)
IMM GRANULOCYTES NFR BLD AUTO: 1 % (ref 0–0.5)
KETONES UR QL STRIP.AUTO: NEGATIVE MG/DL
LEUKOCYTE ESTERASE UR QL STRIP.AUTO: NEGATIVE
LYMPHOCYTES # BLD: 1.2 K/UL (ref 0.8–3.5)
LYMPHOCYTES NFR BLD: 17 % (ref 12–49)
MCH RBC QN AUTO: 28.4 PG (ref 26–34)
MCHC RBC AUTO-ENTMCNC: 29.8 G/DL (ref 30–36.5)
MCV RBC AUTO: 95.5 FL (ref 80–99)
MONOCYTES # BLD: 0.7 K/UL (ref 0–1)
MONOCYTES NFR BLD: 10 % (ref 5–13)
NEUTS SEG # BLD: 4.9 K/UL (ref 1.8–8)
NEUTS SEG NFR BLD: 68 % (ref 32–75)
NITRITE UR QL STRIP.AUTO: NEGATIVE
NRBC # BLD: 0.1 K/UL (ref 0–0.01)
NRBC BLD-RTO: 1.4 PER 100 WBC
PH UR STRIP: 5.5 [PH] (ref 5–8)
PLATELET # BLD AUTO: 243 K/UL (ref 150–400)
PMV BLD AUTO: 9.1 FL (ref 8.9–12.9)
POTASSIUM SERPL-SCNC: 5 MMOL/L (ref 3.5–5.1)
PROT SERPL-MCNC: 6.2 G/DL (ref 6.4–8.2)
PROT UR STRIP-MCNC: ABNORMAL MG/DL
Q-T INTERVAL, ECG07: 430 MS
QRS DURATION, ECG06: 96 MS
QTC CALCULATION (BEZET), ECG08: 411 MS
RBC # BLD AUTO: 3.34 M/UL (ref 4.1–5.7)
RBC #/AREA URNS HPF: ABNORMAL /HPF
SERVICE CMNT-IMP: ABNORMAL
SODIUM SERPL-SCNC: 132 MMOL/L (ref 136–145)
SP GR UR REFRACTOMETRY: 1.02 (ref 1–1.03)
UA: UC IF INDICATED,UAUC: ABNORMAL
UROBILINOGEN UR QL STRIP.AUTO: 1 EU/DL (ref 0.2–1)
VENTRICULAR RATE, ECG03: 55 BPM
WBC # BLD AUTO: 7.2 K/UL (ref 4.1–11.1)
WBC URNS QL MICRO: ABNORMAL /HPF

## 2020-02-06 PROCEDURE — 74011000250 HC RX REV CODE- 250: Performed by: INTERNAL MEDICINE

## 2020-02-06 PROCEDURE — 94640 AIRWAY INHALATION TREATMENT: CPT

## 2020-02-06 PROCEDURE — 74011636637 HC RX REV CODE- 636/637: Performed by: INTERNAL MEDICINE

## 2020-02-06 PROCEDURE — 82962 GLUCOSE BLOOD TEST: CPT

## 2020-02-06 PROCEDURE — 65660000000 HC RM CCU STEPDOWN

## 2020-02-06 PROCEDURE — 74176 CT ABD & PELVIS W/O CONTRAST: CPT

## 2020-02-06 PROCEDURE — 36415 COLL VENOUS BLD VENIPUNCTURE: CPT

## 2020-02-06 PROCEDURE — 81001 URINALYSIS AUTO W/SCOPE: CPT

## 2020-02-06 PROCEDURE — 85025 COMPLETE CBC W/AUTO DIFF WBC: CPT

## 2020-02-06 PROCEDURE — 74011250637 HC RX REV CODE- 250/637: Performed by: EMERGENCY MEDICINE

## 2020-02-06 PROCEDURE — 74011250637 HC RX REV CODE- 250/637: Performed by: INTERNAL MEDICINE

## 2020-02-06 PROCEDURE — 85018 HEMOGLOBIN: CPT

## 2020-02-06 PROCEDURE — 80053 COMPREHEN METABOLIC PANEL: CPT

## 2020-02-06 PROCEDURE — 77010033678 HC OXYGEN DAILY

## 2020-02-06 RX ORDER — HEPARIN SODIUM 5000 [USP'U]/ML
5000 INJECTION, SOLUTION INTRAVENOUS; SUBCUTANEOUS EVERY 8 HOURS
Status: DISCONTINUED | OUTPATIENT
Start: 2020-02-06 | End: 2020-02-06

## 2020-02-06 RX ORDER — TERAZOSIN 5 MG/1
10 CAPSULE ORAL
Status: DISCONTINUED | OUTPATIENT
Start: 2020-02-06 | End: 2020-02-18 | Stop reason: HOSPADM

## 2020-02-06 RX ORDER — ISOSORBIDE DINITRATE 30 MG/1
20 TABLET ORAL EVERY EVENING
Status: ON HOLD | COMMUNITY
End: 2020-02-07

## 2020-02-06 RX ORDER — METOLAZONE 2.5 MG/1
5 TABLET ORAL ONCE
Status: DISCONTINUED | OUTPATIENT
Start: 2020-02-06 | End: 2020-02-06

## 2020-02-06 RX ORDER — DEXTROSE 50 % IN WATER (D50W) INTRAVENOUS SYRINGE
12.5-25 AS NEEDED
Status: DISCONTINUED | OUTPATIENT
Start: 2020-02-06 | End: 2020-02-18 | Stop reason: HOSPADM

## 2020-02-06 RX ORDER — BUMETANIDE 0.25 MG/ML
2 INJECTION INTRAMUSCULAR; INTRAVENOUS 2 TIMES DAILY
Status: DISCONTINUED | OUTPATIENT
Start: 2020-02-06 | End: 2020-02-06

## 2020-02-06 RX ORDER — FAMOTIDINE 20 MG/1
20 TABLET, FILM COATED ORAL 2 TIMES DAILY
Status: DISCONTINUED | OUTPATIENT
Start: 2020-02-06 | End: 2020-02-07

## 2020-02-06 RX ORDER — METOPROLOL TARTRATE 25 MG/1
25 TABLET, FILM COATED ORAL 2 TIMES DAILY
Status: DISCONTINUED | OUTPATIENT
Start: 2020-02-06 | End: 2020-02-07

## 2020-02-06 RX ORDER — ATORVASTATIN CALCIUM 40 MG/1
40 TABLET, FILM COATED ORAL
Status: DISCONTINUED | OUTPATIENT
Start: 2020-02-06 | End: 2020-02-11

## 2020-02-06 RX ORDER — METOLAZONE 2.5 MG/1
5 TABLET ORAL ONCE
Status: COMPLETED | OUTPATIENT
Start: 2020-02-06 | End: 2020-02-06

## 2020-02-06 RX ORDER — OXYCODONE AND ACETAMINOPHEN 5; 325 MG/1; MG/1
1 TABLET ORAL
Status: DISCONTINUED | OUTPATIENT
Start: 2020-02-06 | End: 2020-02-09

## 2020-02-06 RX ORDER — FEBUXOSTAT 40 MG/1
80 TABLET, FILM COATED ORAL DAILY
Status: DISCONTINUED | OUTPATIENT
Start: 2020-02-06 | End: 2020-02-18 | Stop reason: HOSPADM

## 2020-02-06 RX ORDER — RANITIDINE 15 MG/ML
150 SYRUP ORAL 2 TIMES DAILY
Status: DISCONTINUED | OUTPATIENT
Start: 2020-02-06 | End: 2020-02-06 | Stop reason: SDUPTHER

## 2020-02-06 RX ORDER — FINASTERIDE 5 MG/1
5 TABLET, FILM COATED ORAL
Status: DISCONTINUED | OUTPATIENT
Start: 2020-02-06 | End: 2020-02-18 | Stop reason: HOSPADM

## 2020-02-06 RX ORDER — SODIUM CHLORIDE 0.9 % (FLUSH) 0.9 %
5-40 SYRINGE (ML) INJECTION EVERY 8 HOURS
Status: DISCONTINUED | OUTPATIENT
Start: 2020-02-06 | End: 2020-02-18 | Stop reason: HOSPADM

## 2020-02-06 RX ORDER — INSULIN GLARGINE 100 [IU]/ML
35 INJECTION, SOLUTION SUBCUTANEOUS DAILY
Status: DISCONTINUED | OUTPATIENT
Start: 2020-02-06 | End: 2020-02-07

## 2020-02-06 RX ORDER — INSULIN GLARGINE 100 [IU]/ML
30 INJECTION, SOLUTION SUBCUTANEOUS DAILY
Status: DISCONTINUED | OUTPATIENT
Start: 2020-02-06 | End: 2020-02-06

## 2020-02-06 RX ORDER — ONDANSETRON 2 MG/ML
4 INJECTION INTRAMUSCULAR; INTRAVENOUS
Status: DISCONTINUED | OUTPATIENT
Start: 2020-02-06 | End: 2020-02-18 | Stop reason: HOSPADM

## 2020-02-06 RX ORDER — BUMETANIDE 0.25 MG/ML
2 INJECTION INTRAMUSCULAR; INTRAVENOUS EVERY 12 HOURS
Status: DISCONTINUED | OUTPATIENT
Start: 2020-02-06 | End: 2020-02-06

## 2020-02-06 RX ORDER — PANTOPRAZOLE SODIUM 40 MG/1
40 TABLET, DELAYED RELEASE ORAL DAILY
COMMUNITY

## 2020-02-06 RX ORDER — IPRATROPIUM BROMIDE AND ALBUTEROL SULFATE 2.5; .5 MG/3ML; MG/3ML
3 SOLUTION RESPIRATORY (INHALATION)
Status: DISCONTINUED | OUTPATIENT
Start: 2020-02-06 | End: 2020-02-09

## 2020-02-06 RX ORDER — ACETAMINOPHEN 325 MG/1
650 TABLET ORAL
Status: DISCONTINUED | OUTPATIENT
Start: 2020-02-06 | End: 2020-02-11

## 2020-02-06 RX ORDER — OXYCODONE HYDROCHLORIDE 5 MG/1
10 TABLET ORAL
Status: COMPLETED | OUTPATIENT
Start: 2020-02-06 | End: 2020-02-06

## 2020-02-06 RX ORDER — INSULIN LISPRO 100 [IU]/ML
INJECTION, SOLUTION INTRAVENOUS; SUBCUTANEOUS
Status: DISCONTINUED | OUTPATIENT
Start: 2020-02-06 | End: 2020-02-18 | Stop reason: HOSPADM

## 2020-02-06 RX ORDER — BUMETANIDE 0.25 MG/ML
2 INJECTION INTRAMUSCULAR; INTRAVENOUS EVERY 12 HOURS
Status: DISCONTINUED | OUTPATIENT
Start: 2020-02-06 | End: 2020-02-07

## 2020-02-06 RX ORDER — MAGNESIUM SULFATE 100 %
4 CRYSTALS MISCELLANEOUS AS NEEDED
Status: DISCONTINUED | OUTPATIENT
Start: 2020-02-06 | End: 2020-02-18 | Stop reason: HOSPADM

## 2020-02-06 RX ORDER — SODIUM CHLORIDE 0.9 % (FLUSH) 0.9 %
5-40 SYRINGE (ML) INJECTION AS NEEDED
Status: DISCONTINUED | OUTPATIENT
Start: 2020-02-06 | End: 2020-02-18 | Stop reason: HOSPADM

## 2020-02-06 RX ADMIN — BUMETANIDE 2 MG: 0.25 INJECTION INTRAMUSCULAR; INTRAVENOUS at 09:08

## 2020-02-06 RX ADMIN — Medication 10 ML: at 21:16

## 2020-02-06 RX ADMIN — ATORVASTATIN CALCIUM 40 MG: 40 TABLET, FILM COATED ORAL at 04:19

## 2020-02-06 RX ADMIN — Medication 20 ML: at 06:00

## 2020-02-06 RX ADMIN — ATORVASTATIN CALCIUM 40 MG: 40 TABLET, FILM COATED ORAL at 22:32

## 2020-02-06 RX ADMIN — BUMETANIDE 2 MG: 0.25 INJECTION INTRAMUSCULAR; INTRAVENOUS at 04:14

## 2020-02-06 RX ADMIN — INSULIN LISPRO 5 UNITS: 100 INJECTION, SOLUTION INTRAVENOUS; SUBCUTANEOUS at 12:21

## 2020-02-06 RX ADMIN — FINASTERIDE 5 MG: 5 TABLET, FILM COATED ORAL at 22:34

## 2020-02-06 RX ADMIN — INSULIN LISPRO 3 UNITS: 100 INJECTION, SOLUTION INTRAVENOUS; SUBCUTANEOUS at 09:07

## 2020-02-06 RX ADMIN — INSULIN LISPRO 5 UNITS: 100 INJECTION, SOLUTION INTRAVENOUS; SUBCUTANEOUS at 17:43

## 2020-02-06 RX ADMIN — BUMETANIDE 2 MG: 0.25 INJECTION INTRAMUSCULAR; INTRAVENOUS at 22:24

## 2020-02-06 RX ADMIN — POLYMYXIN B SULFATE, BACITRACIN ZINC, NEOMYCIN SULFATE: 5000; 3.5; 4 OINTMENT TOPICAL at 21:20

## 2020-02-06 RX ADMIN — Medication 10 ML: at 15:40

## 2020-02-06 RX ADMIN — OXYCODONE AND ACETAMINOPHEN 1 TABLET: 5; 325 TABLET ORAL at 22:37

## 2020-02-06 RX ADMIN — APIXABAN 5 MG: 5 TABLET, FILM COATED ORAL at 04:21

## 2020-02-06 RX ADMIN — METOLAZONE 5 MG: 2.5 TABLET ORAL at 15:39

## 2020-02-06 RX ADMIN — IPRATROPIUM BROMIDE AND ALBUTEROL SULFATE 3 ML: .5; 3 SOLUTION RESPIRATORY (INHALATION) at 15:30

## 2020-02-06 RX ADMIN — IPRATROPIUM BROMIDE AND ALBUTEROL SULFATE 3 ML: .5; 3 SOLUTION RESPIRATORY (INHALATION) at 09:45

## 2020-02-06 RX ADMIN — IPRATROPIUM BROMIDE AND ALBUTEROL SULFATE 3 ML: .5; 3 SOLUTION RESPIRATORY (INHALATION) at 19:30

## 2020-02-06 RX ADMIN — TERAZOSIN HYDROCHLORIDE 10 MG: 5 CAPSULE ORAL at 04:15

## 2020-02-06 RX ADMIN — OXYCODONE 10 MG: 5 TABLET ORAL at 01:38

## 2020-02-06 RX ADMIN — TERAZOSIN HYDROCHLORIDE 10 MG: 5 CAPSULE ORAL at 22:35

## 2020-02-06 RX ADMIN — INSULIN GLARGINE 35 UNITS: 100 INJECTION, SOLUTION SUBCUTANEOUS at 09:07

## 2020-02-06 RX ADMIN — FAMOTIDINE 20 MG: 20 TABLET, FILM COATED ORAL at 17:42

## 2020-02-06 RX ADMIN — FAMOTIDINE 20 MG: 20 TABLET, FILM COATED ORAL at 09:09

## 2020-02-06 RX ADMIN — INSULIN LISPRO 3 UNITS: 100 INJECTION, SOLUTION INTRAVENOUS; SUBCUTANEOUS at 22:28

## 2020-02-06 RX ADMIN — BUMETANIDE 2 MG: 0.25 INJECTION INTRAMUSCULAR; INTRAVENOUS at 15:39

## 2020-02-06 NOTE — PROGRESS NOTES
Problem: Falls - Risk of  Goal: *Absence of Falls  Description  Document [place] Fall Risk and appropriate interventions in the flowsheet. Outcome: Progressing Towards Goal  Note: Fall Risk Interventions:            Medication Interventions: Bed/chair exit alarm    Elimination Interventions: Call light in reach, Bed/chair exit alarm              Problem: Pressure Injury - Risk of  Goal: *Prevention of pressure injury  Description  Document Jose Roberto Scale and appropriate interventions in the flowsheet.   Outcome: Progressing Towards Goal  Note: Pressure Injury Interventions:  Sensory Interventions: Assess changes in LOC    Moisture Interventions: Absorbent underpads    Activity Interventions: Increase time out of bed    Mobility Interventions: HOB 30 degrees or less    Nutrition Interventions: Document food/fluid/supplement intake    Friction and Shear Interventions: HOB 30 degrees or less                Problem: Patient Education: Go to Patient Education Activity  Goal: Patient/Family Education  Outcome: Progressing Towards Goal

## 2020-02-06 NOTE — PROGRESS NOTES
Pt has rash through out body he claims as cancer. There is heels ulcers, sacrum, excoriation under folds, and penis is bleeding/ blistered. Bilateral weeping legs, possible cellulitis. Consult wound care. Pt needs to be turned every 2 hours. Pt valdemar with pauses. Not asymptotic. Confirmed he wants to be full code.

## 2020-02-06 NOTE — CONSULTS
Requesting Provider: Danica Otero MD - Reason for Consultation: \"penile lesion\"  Pre-existing [facility] Patient:   No                Patient: Bee Fong MRN: [id number]  SSN: [id number]    YOB: 1945  Age: 75 y.o. Sex: male     Location: 2241/01       Code Status: Full Code   PCP: Jorge, MD Brittany  - None   Emergency Contact:  Primary Emergency Contact: Brenna José, Home Phone: [phone]   Race/Buddhist/Language: [facility] / Jonna Drought / [place]: Payor: Heraclio Jung / Plan: Josh Magic / Product Type: Medicare /    Prior Admission Data: 12/5/19 MRM [address]   Hospitalized:  Hospital Day: 2 - Admitted 2/5/2020  8:47 PM     CONSULTANTS  IP CONSULT TO HOSPITALIST  IP CONSULT TO NEPHROLOGY  IP CONSULT TO UROLOGY  IP CONSULT TO PULMONOLOGY   ADMISSION DIAGNOSES    ICD-10-CM ICD-9-CM   1. Acute on chronic congestive heart failure, unspecified heart failure type (HCC) I50.9 428.0   2. Pulmonary edema cardiac cause (HCC) I50.1 514   3. Hypoxia R09.02 799.02   4. Acute on chronic renal insufficiency N28.9 593.9    N18.9 585.9         Assessment/Plan:       · Gross hematuria    Manually irrigate catheter PRN. Defer davis care to ordering physician. Placed for strict I&O. · Penile laceration, d/t penile swelling    -Wound care consult    · Scrotal swelling    -Elevate scrotum w/ towel     CC: Respiratory Distress   HPI: He is a 75 y.o. male admitted for cc of worsening shortness of breath. PmHx of  DM, HTN, CKD 3, right renal carcinoma, right nephrectomy, hematuria, CT showing retroperitoneal lymph node and osseous metastases. Chronic edema of all 4 extremities, admitted to [place] for diuresis recently. Pt in bed receiving breathing txs, audible wheezing noted. CT ordered for today. Exam showing penile edema, laceration on penis likely d/t edema, and scrotal edema. Davis in place draining bloody urine, on eliquis.         Problem: penile wound; Location: penis; Severity: moderate; Timin weeks, Context: as above; Better/Worse: wound care, Associated s/s: edema     Temp (24hrs), Av.2 °F (36.8 °C), Min:97.9 °F (36.6 °C), Max:98.6 °F (37 °C)    Urinary Status: Guzman  Creatinine   Date/Time Value Ref Range Status   2020 05:34 AM 1.73 (H) 0.70 - 1.30 MG/DL Final   2020 09:06 PM 1.82 (H) 0.70 - 1.30 MG/DL Final   2019 03:30 AM 1.43 (H) 0.70 - 1.30 MG/DL Final   2019 02:56 AM 1.62 (H) 0.70 - 1.30 MG/DL Final   2019 03:38 AM 1.61 (H) 0.70 - 1.30 MG/DL Final     Current Antimicrobial Therapy (168h ago, onward)    None        Key Anti-Platelet Anticoagulant Meds             apixaban (ELIQUIS) 5 mg tablet Take 1 Tab by mouth every twelve (12) hours.         Diet: DIET DIABETIC CONSISTENT CARB Regular; 2 GM NA (House Low NA) -       Labs     Lab Results   Component Value Date/Time    Lactic acid 1.8 2020 09:06 PM    WBC 7.2 2020 05:34 AM    HCT 31.9 (L) 2020 05:34 AM    PLATELET 612  05:34 AM    Sodium 132 (L) 2020 05:34 AM    Potassium 5.0 2020 05:34 AM    Chloride 95 (L) 2020 05:34 AM    CO2 30 2020 05:34 AM    BUN 65 (H) 2020 05:34 AM    Creatinine 1.73 (H) 2020 05:34 AM    Glucose 217 (H) 2020 05:34 AM    Calcium 8.9 2020 05:34 AM    Magnesium 2.7 (H) 2019 03:30 AM     UA:   Lab Results   Component Value Date/Time    Color YELLOW/STRAW 2020 05:28 AM    Appearance CLEAR 2020 05:28 AM    Specific gravity 1.016 2020 05:28 AM    pH (UA) 5.5 2020 05:28 AM    Protein TRACE (A) 2020 05:28 AM    Glucose NEGATIVE  2020 05:28 AM    Ketone NEGATIVE  2020 05:28 AM    Bilirubin NEGATIVE  2020 05:28 AM    Urobilinogen 1.0 2020 05:28 AM    Nitrites NEGATIVE  2020 05:28 AM    Leukocyte Esterase NEGATIVE  2020 05:28 AM    Epithelial cells FEW 2020 05:28 AM    Bacteria NEGATIVE 02/06/2020 05:28 AM    WBC 0-4 02/06/2020 05:28 AM    RBC  02/06/2020 05:28 AM     Imaging     Results for orders placed during the hospital encounter of 12/01/19   CT ABD PELV WO CONT    Addendum Addendum: CORRECTION: The lungs are not clear. There are small patchy infiltrates in the upper lobes bilaterally and a focal airspace opacity in  the right lower lobe. There is a 1.1 x 1.2 cm left upper lobe pulmonary nodule  (4, 32). ADDITIONAL IMPRESSION: Patchy airspace infiltrate in the apices and right  lower lobe which may be infectious. 1.1 x 1.2 cm left upper lobe pulmonary  nodule suspicious for metastatic deposit in patient with suspected metastatic  neoplasm. Shantal Lindsay MD 12/2/2019  9:17 AM          Narrative EXAM: CT CHEST WO CONT, CT ABD PELV WO CONT    INDICATION: chest pain with hematuria; eval for dissection    COMPARISON: Chest x-ray of earlier today and MRI abdomen of 1/28/2014. TECHNIQUE:  5 mm axial images were obtained through the chest, abdomen, and  pelvis. Oral and IV contrast were not administered. Coronal and sagittal  reconstructions were generated. CT dose reduction was achieved through use of a  standardized protocol tailored for this examination and automatic exposure  control for dose modulation. FINDINGS:    THYROID: No nodule. MEDIASTINUM: No mass or lymphadenopathy. SUSIE: No mass or lymphadenopathy. THORACIC AORTA: Atherosclerotic calcification without aneurysm. MAIN PULMONARY ARTERY: Normal in caliber. TRACHEA/BRONCHI: Patent. ESOPHAGUS: No wall thickening or dilatation. HEART: The heart is normal in size without pericardial effusion. There is fatty  metaplasia of the right ventricular wall. Coronary artery calcifications are  noted. PLEURA: No effusion or pneumothorax. LUNGS: No nodule, mass, or airspace disease. LIVER: No mass or biliary dilation. GALLBLADDER: Unremarkable. SPLEEN: Unremarkable. PANCREAS: No mass or ductal dilation.   ADRENALS: Unremarkable. KIDNEYS: Marked renal atrophy with tiny right kidney similar to appearance to  prior MRI. Left kidney appears unremarkable with no hydronephrosis. STOMACH: Unremarkable  SMALL BOWEL: No dilatation or wall thickening. COLON: No dilatation or wall thickening. APPENDIX: Unremarkable. PERITONEUM: No ascites or pneumoperitoneum. RETROPERITONEUM: Atherosclerotic calcifications without aneurysm. Left pelvic  sidewall lymph node measures 1.6 x 3.4 cm (2, 114) No other enlarged  lymphadenopathy. REPRODUCTIVE ORGANS: Prostate and seminal vesicles appear unremarkable. URINARY BLADDER: No mass or calculus. BONES: The spine change. Multiple sclerotic osseous metastases within the  vertebral bodies and pelvis. ADDITIONAL COMMENTS: N/A      Impression IMPRESSION: No acute findings on unenhanced exam which may not be sensitive for  dissection. Incidentals as above including multiple sclerotic osseous metastases  and left pelvic sidewall lymph node suspicious for possible metastases in this  patient with known cancer, possibly prostate. US Results (most recent):  Results from [place] encounter on 12/01/19   US RETROPERITONEUM COMP    Narrative EXAM:  US RETROPERITONEUM COMP    INDICATION:  Gross hematuria, status post right nephrectomy, eval for bladder  renal mass. COMPARISON: None. TECHNIQUE:  Real-time sonography of the kidneys, retroperitoneum and bladder was performed  with multiple static images obtained. FINDINGS:  The left kidney measures 12.3 cm and there is no hydronephrosis. Patient status post right nephrectomy. The nephrectomy bed there is a 4.5 cm  hypoechoic lesion. The aorta is obscured by bowel gas  The proximal iliac arteries are obscured by  bowel gas  The IVC is obscured by bowel gas fraction    The urinary bladder is normal.      Impression IMPRESSION:   1. Left kidney is within normal limits. 2. Patient is status post right nephrectomy.  In the right nephrectomy bed, there  is a 4.5 cm nonspecific hypoechoic mass. 3. Urinary bladder is unremarkable. Cultures     All Micro Results     Procedure Component Value Units Date/Time    CULTURE, BLOOD, PAIRED [[id number]] Collected:  02/05/20 2106    Order Status:  Completed Specimen:  Blood Updated:  02/05/20 2210           Past History: (Complete 2+/3 areas)     Allergies   Allergen Reactions    Allopurinol Rash    Gabapentin Other (comments)     nightmares    Paradione Hives      Current Facility-Administered Medications   Medication Dose Route Frequency    sodium chloride (NS) flush 5-40 mL  5-40 mL IntraVENous Q8H    sodium chloride (NS) flush 5-40 mL  5-40 mL IntraVENous PRN    acetaminophen (TYLENOL) tablet 650 mg  650 mg Oral Q6H PRN    ondansetron (ZOFRAN) injection 4 mg  4 mg IntraVENous Q4H PRN    [Held by provider] apixaban (ELIQUIS) tablet 5 mg  5 mg Oral Q12H    atorvastatin (LIPITOR) tablet 40 mg  40 mg Oral QHS    . PHARMACY TO SUBSTITUTE PER PROTOCOL (Reordered from: cabozantinib 40 mg tab)    Per Protocol    febuxostat (ULORIC) tablet 80 mg  80 mg Oral DAILY    finasteride (PROSCAR) tablet 5 mg  5 mg Oral QHS    metoprolol tartrate (LOPRESSOR) tablet 25 mg  25 mg Oral BID    oxyCODONE-acetaminophen (PERCOCET) 5-325 mg per tablet 1 Tab  1 Tab Oral Q6H PRN    terazosin (HYTRIN) capsule 10 mg  10 mg Oral QHS    insulin lispro (HUMALOG) injection   SubCUTAneous AC&HS    glucose chewable tablet 16 g  4 Tab Oral PRN    dextrose (D50W) injection syrg 12.5-25 g  12.5-25 g IntraVENous PRN    glucagon (GLUCAGEN) injection 1 mg  1 mg IntraMUSCular PRN    famotidine (PEPCID) tablet 20 mg  20 mg Oral BID    albuterol-ipratropium (DUO-NEB) 2.5 MG-0.5 MG/3 ML  3 mL Nebulization Q6H RT    insulin glargine (LANTUS) injection 35 Units  35 Units SubCUTAneous DAILY    bumetanide (BUMEX) injection 2 mg  2 mg IntraVENous Q12H    metOLazone (ZAROXOLYN) tablet 5 mg  5 mg Oral ONCE    Prior to Admission medications    Medication Sig Start Date End Date Taking? Authorizing Provider   metoprolol tartrate (LOPRESSOR) 100 mg IR tablet Take 1 Tab by mouth two (2) times a day. 12/5/19   Micah Mott MD   apixaban (ELIQUIS) 5 mg tablet Take 1 Tab by mouth every twelve (12) hours. 12/5/19   Micah Mott MD   pyridoxine, vitamin B6, (VITAMIN B-6) 50 mg cap Take 1 Cap by mouth daily. Provider, Historical   cabozantinib 40 mg tab Take 40 mg by mouth daily. Provider, Historical   raNITIdine (ZANTAC) 150 mg tablet Take 150 mg by mouth two (2) times a day. Provider, Historical   cyanocobalamin (VITAMIN B12) 250 mcg tablet Take 1,000 mcg by mouth two (2) times a day. Provider, Historical   cholecalciferol (VITAMIN D3) 25 mcg (1,000 unit) cap Take 2,000 Units by mouth daily. Provider, Historical   multivitamin (ONE A DAY) tablet Take 1 Tab by mouth daily. Provider, Historical   nitroglycerin (NITROSTAT) 0.4 mg SL tablet 0.4 mg by SubLINGual route every five (5) minutes as needed for Chest Pain. Up to 3 doses. Provider, Historical   folic acid (FOLVITE) 1 mg tablet Take 3 mg by mouth nightly. Provider, Historical   oxyCODONE-acetaminophen (PERCOCET) 5-325 mg per tablet Take 1 Tab by mouth every six (6) hours as needed for Pain. Provider, Historical   torsemide (DEMADEX) 20 mg tablet Take 60 mg by mouth two (2) times a day. Provider, Historical   finasteride (PROSCAR) 5 mg tablet Take 5 mg by mouth nightly. Provider, Historical   terazosin (HYTRIN) 10 mg capsule Take 10 mg by mouth nightly. Provider, Historical   atorvastatin (LIPITOR) 80 mg tablet Take 40 mg by mouth nightly. Provider, Historical   docusate sodium (COLACE) 100 mg capsule Take 100 mg by mouth three (3) times daily as needed. Provider, Historical   sennosides (SENNA) 8.6 mg cap Take 1 Cap by mouth nightly. Provider, Historical   febuxostat (ULORIC) 40 mg tab tablet Take 80 mg by mouth daily.     Provider, Historical   potassium chloride SR (KLOR-CON 10) 10 mEq tablet Take 30 mEq by mouth two (2) times a day. Provider, Historical   insulin glargine (LANTUS,BASAGLAR) 100 unit/mL (3 mL) inpn 50 Units by SubCUTAneous route daily. Provider, Historical   insulin CONCENTRATED regular (HUMULIN R U-500) 500 unit/mL soln 200 Units by SubCUTAneous route Daily (before breakfast). Provider, Historical   insulin CONCENTRATED regular (HUMULIN R U-500) 500 unit/mL soln 50 Units by SubCUTAneous route Daily (before dinner). Provider, Historical        PMHx:  has a past medical history of CAD (coronary artery disease), Cancer ([facility] [address] CKD (chronic kidney disease) stage 3, GFR 30-59 ml/min ([facility] [address] Diabetes ([facility] [address] Hypertension, Peripheral neuropathy, and Sleep disorder. PSurgHx:  has a past surgical history that includes hx nephrostomy; hx orthopaedic; pr cardiac surg procedure unlist; and ir stent placement. PSocHx:  reports that he has never smoked. He has never used smokeless tobacco. He reports that he does not drink alcohol or use drugs.    ROS:  (Complete - 10 systems) - DENIES: Weightloss (Constitutional), Dry mouth (ENMT), Chest pain (CV), SOB (Respiratory), Constipation (GI), Weakness (MS), Pallor (Skin), TIA Sx (Neuro), Confusion (Psych), Easy bruising (Heme)    Physical Exam: (Comprehesive - 8+ 1995 Systems)     (1) Constitutional:  FIO2: FIO2 (%): 50 % on SpO2: O2 Sat (%): 93 %  O2 Device: Ventimask O2 Flow Rate (L/min): 12 l/min  Patient Vitals for the past 24 hrs:   BP Temp Pulse Resp SpO2 Height Weight   02/06/20 1048 145/90 98.6 °F (37 °C) 71 15 93 %     02/06/20 0948     94 %     02/06/20 0908 152/59  68       02/06/20 0900   60       02/06/20 0735 132/51 98.2 °F (36.8 °C) 73 22 93 %     02/06/20 0501 144/62 98.2 °F (36.8 °C) 60 20 94 %  (!) 165 kg (363 lb 12.1 oz)   02/06/20 0414 154/67  62       02/06/20 0400 145/49  (!) 59 14 94 %     02/06/20 0355     95 %     02/06/20 0039     90 %     02/06/20 0030 143/55  (!) 51 15 95 %     02/06/20 0015 133/61  (!) 54 15 95 %     02/05/20 2355     95 %     02/05/20 2348 133/48  (!) 54       02/05/20 2345 133/48  (!) 55 16 (!) 86 %     02/05/20 2330 145/69  (!) 54 14 93 %     02/05/20 2315 135/56  (!) 54 12 94 %     02/05/20 2300 127/56  (!) 53 14 99 %     02/05/20 2245 135/53  (!) 50 12 99 %     02/05/20 2111     90 %     02/05/20 2051 110/70 97.9 °F (36.6 °C) (!) 57 19 91 % 5' 11\" (1.803 m) (!) 161 kg (355 lb)       Date 02/05/20 0700 - 02/06/20 0659 02/06/20 0700 - 02/07/20 0659   Shift 5647-3809 7155-8741 24 Hour Total 3461-9755 5336-1062 24 Hour Total   INTAKE   Shift Total(mL/kg)         OUTPUT   Urine  275(0.1) 275(0.1)        Urine Voided  175 175        Urine Output (mL) (Urinary Catheter 02/06/20 Guzman)  100 100      Shift Total(mL/kg)  275(1.7) 275(1.7)      NET  -275 -275      Weight (kg)  165 165 165 165 165      (2) ENMT:   moist mucous membranes, normal sinuses   (3) Respiratory:   Audible wheezing, difficulty breathing   (4) GI:  no abdominal masses, tenderness   (5) :   Guzman in place, gross hematuria present, wound on penis   (6) Lymphatic:  no adenopathy, neck supple   (7) Muscloskeletal:  no gross deformity, normal ROM   (8) Skin:  no rash, warm & dry   (9) Neuro:  no focal deficits, normal speech      Signed By: Ramakrishna Arroyo NP  - February 6, 2020

## 2020-02-06 NOTE — ED PROVIDER NOTES
EMERGENCY DEPARTMENT HISTORY AND PHYSICAL EXAM     ----------------------------------------------------------------------------  Please note that this dictation was completed with [employer], the computer voice recognition software. Quite often unanticipated grammatical, syntax, homophones, and other interpretive errors are inadvertently transcribed by the computer software. Please disregard these errors. Please excuse any errors that have escaped final proofreading  ----------------------------------------------------------------------------      Date: 2/5/2020  Patient Name: Juan Manuel Galindo    History of Presenting Illness     Chief Complaint   Patient presents with    Respiratory Distress       History Provided By:  Patient and family    HPI: Juan Manuel Galindo is a 75 y.o. male, with significant pmhx of DM with peripheral neuropathy, CAD, CRF, Afib (eliquis), who presents via EMS to the ED with c/o shortness of breath and wheezing. Patient's family notes that he was discharged from rehab yesterday after exceeding his 20-day Medicare funded stay after a 2-week hospitalization at the [place]. Patient noted to have metastatic lung cancer from previous renal cell carcinoma. Has been undergoing chemo for the last 6 years through the [place]. Patient's family reports that in December he was undergoing a stress prompting them to bring him to the ER at the [place]. Patient was subsequently admitted for several days with further work-up. Patient had additional admission in the month of January from which she was discharged to the [facility] rehab facility. Patient's family notes that since his last admission he has had progressively worsening edema and generally. Family reports that he was sent home with home health services to be set up and start tomorrow. Family notes that he is on home oxygen at 2 L nasal cannula.   Patient was reportedly feeling mildly short of breath and decided to take an albuterol treatment at 6 PM this evening and stopped using his oxygen during this time. During this treatment he began feeling worse prompting them to call 911 and be brought to our facility. Patient started on BiPAP by EMS due to room air sats in the 60s on their arrival improvement to 93%. Patient also specifically denies any associated fevers, chills, CP, nausea, vomiting, diarrhea, abd pain, changes in BM, urinary sxs, or headache. Social Hx: denies tobacco  denies EtOH , denies Illicit Drugs    There are no other complaints, changes, or physical findings at this time. PCP: Jorge, MD Brittany    Allergies   Allergen Reactions    Allopurinol Rash    Gabapentin Other (comments)     nightmares    Paradione Hives       Current Outpatient Medications   Medication Sig Dispense Refill    metoprolol tartrate (LOPRESSOR) 100 mg IR tablet Take 1 Tab by mouth two (2) times a day. 60 Tab 0    apixaban (ELIQUIS) 5 mg tablet Take 1 Tab by mouth every twelve (12) hours. 60 Tab 0    pyridoxine, vitamin B6, (VITAMIN B-6) 50 mg cap Take 1 Cap by mouth daily.  cabozantinib 40 mg tab Take 40 mg by mouth daily.  raNITIdine (ZANTAC) 150 mg tablet Take 150 mg by mouth two (2) times a day.  cyanocobalamin (VITAMIN B12) 250 mcg tablet Take 1,000 mcg by mouth two (2) times a day.  cholecalciferol (VITAMIN D3) 25 mcg (1,000 unit) cap Take 2,000 Units by mouth daily.  multivitamin (ONE A DAY) tablet Take 1 Tab by mouth daily.  nitroglycerin (NITROSTAT) 0.4 mg SL tablet 0.4 mg by SubLINGual route every five (5) minutes as needed for Chest Pain. Up to 3 doses.  folic acid (FOLVITE) 1 mg tablet Take 3 mg by mouth nightly.  oxyCODONE-acetaminophen (PERCOCET) 5-325 mg per tablet Take 1 Tab by mouth every six (6) hours as needed for Pain.  torsemide (DEMADEX) 20 mg tablet Take 60 mg by mouth two (2) times a day.  finasteride (PROSCAR) 5 mg tablet Take 5 mg by mouth nightly.       terazosin (HYTRIN) 10 mg capsule Take 10 mg by mouth nightly.  atorvastatin (LIPITOR) 80 mg tablet Take 40 mg by mouth nightly.  docusate sodium (COLACE) 100 mg capsule Take 100 mg by mouth three (3) times daily as needed.  sennosides (SENNA) 8.6 mg cap Take 1 Cap by mouth nightly.  febuxostat (ULORIC) 40 mg tab tablet Take 80 mg by mouth daily.  potassium chloride SR (KLOR-CON 10) 10 mEq tablet Take 30 mEq by mouth two (2) times a day.  insulin glargine (LANTUS,BASAGLAR) 100 unit/mL (3 mL) inpn 50 Units by SubCUTAneous route daily.  insulin CONCENTRATED regular (HUMULIN R U-500) 500 unit/mL soln 200 Units by SubCUTAneous route Daily (before breakfast).  insulin CONCENTRATED regular (HUMULIN R U-500) 500 unit/mL soln 50 Units by SubCUTAneous route Daily (before dinner). Past History     Past Medical History:  Past Medical History:   Diagnosis Date    CAD (coronary artery disease)     [facility] [address]     RENAL     Diabetes ([facility] [address]     TYPE 2    Hypertension     Peripheral neuropathy     Sleep disorder     SLEEP APNEA       Past Surgical History:  Past Surgical History:   Procedure Laterality Date    CARDIAC SURG PROCEDURE UNLIST      HX NEPHROSTOMY      HX ORTHOPAEDIC      2 KNEE REPLACEMENTS    IR STENT PLACEMENT         Family History:  No family history on file. Social History:  Social History     Tobacco Use    Smoking status: Never Smoker    Smokeless tobacco: Never Used   Substance Use Topics    Alcohol use: Never     Frequency: Never    Drug use: Never       Allergies: Allergies   Allergen Reactions    Allopurinol Rash    Gabapentin Other (comments)     nightmares    Paradione Hives         Review of Systems   Review of Systems   Constitutional: Negative for chills and fever. HENT: Negative. Eyes: Negative. Respiratory: Positive for shortness of breath and wheezing. Negative for cough and chest tightness. Cardiovascular: Positive for leg swelling.  Negative for chest pain.   Gastrointestinal: Negative for abdominal pain, diarrhea, nausea and vomiting. Endocrine: Negative. Genitourinary: Negative for difficulty urinating and dysuria. Musculoskeletal: Negative for myalgias. Skin: Negative. Neurological: Negative. Psychiatric/Behavioral: Negative. All other systems reviewed and are negative. Physical Exam   Physical Exam  Vitals signs and nursing note reviewed. Constitutional:       General: He is not in acute distress. Appearance: He is well-developed. He is obese. He is not diaphoretic. HENT:      Head: Normocephalic and atraumatic. Nose: Nose normal.      Mouth/Throat:      Pharynx: No oropharyngeal exudate. Eyes:      Conjunctiva/sclera: Conjunctivae normal.      Pupils: Pupils are equal, round, and reactive to light. Neck:      Musculoskeletal: Normal range of motion and neck supple. Vascular: No JVD. Cardiovascular:      Rate and Rhythm: Normal rate and regular rhythm. Heart sounds: Normal heart sounds. No murmur. No friction rub. Pulmonary:      Effort: Pulmonary effort is normal. No respiratory distress. Breath sounds: Normal breath sounds. No stridor. No wheezing or rales. Abdominal:      General: Bowel sounds are normal. There is no distension. Palpations: Abdomen is soft. Tenderness: There is no abdominal tenderness. There is no rebound. Genitourinary:     Comments: Genital edema  Musculoskeletal: Normal range of motion. General: No tenderness. Right lower leg: Edema present. Left lower leg: Edema present. Skin:     General: Skin is warm and dry. Findings: Bruising present. No rash. Neurological:      Mental Status: He is alert and oriented to person, place, and time. Cranial Nerves: No cranial nerve deficit. Psychiatric:         Speech: Speech normal.         Behavior: Behavior normal.         Thought Content:  Thought content normal.         Judgment: Judgment normal.           Diagnostic Study Results     Labs -     Recent Results (from the past 12 hour(s))   EKG, 12 LEAD, INITIAL    Collection Time: 02/05/20  9:02 PM   Result Value Ref Range    Ventricular Rate 55 BPM    Atrial Rate 30 BPM    QRS Duration 96 ms    Q-T Interval 430 ms    QTC Calculation (Bezet) 411 ms    Calculated R Axis -23 degrees    Calculated T Axis 54 degrees    Diagnosis       Atrial fibrillation with slow ventricular response  Low voltage QRS  When compared with ECG of 02-DEC-2019 00:14,  Nonspecific T wave abnormality no longer evident in Inferior leads  Nonspecific T wave abnormality, improved in Anterolateral leads     CBC WITH AUTOMATED DIFF    Collection Time: 02/05/20  9:06 PM   Result Value Ref Range    WBC 8.4 4.1 - 11.1 K/uL    RBC 3.53 (L) 4.10 - 5.70 M/uL    HGB 10.2 (L) 12.1 - 17.0 g/dL    HCT 33.6 (L) 36.6 - 50.3 %    MCV 95.2 80.0 - 99.0 FL    MCH 28.9 26.0 - 34.0 PG    MCHC 30.4 30.0 - 36.5 g/dL    RDW 18.9 (H) 11.5 - 14.5 %    PLATELET 646 173 - 602 K/uL    MPV 9.0 8.9 - 12.9 FL    NRBC 1.0 (H) 0  WBC    ABSOLUTE NRBC 0.08 (H) 0.00 - 0.01 K/uL    NEUTROPHILS 68 32 - 75 %    LYMPHOCYTES 16 12 - 49 %    MONOCYTES 10 5 - 13 %    EOSINOPHILS 4 0 - 7 %    BASOPHILS 1 0 - 1 %    IMMATURE GRANULOCYTES 1 (H) 0.0 - 0.5 %    ABS. NEUTROPHILS 5.7 1.8 - 8.0 K/UL    ABS. LYMPHOCYTES 1.3 0.8 - 3.5 K/UL    ABS. MONOCYTES 0.8 0.0 - 1.0 K/UL    ABS. EOSINOPHILS 0.3 0.0 - 0.4 K/UL    ABS. BASOPHILS 0.1 0.0 - 0.1 K/UL    ABS. IMM.  GRANS. 0.1 (H) 0.00 - 0.04 K/UL    DF AUTOMATED     METABOLIC PANEL, COMPREHENSIVE    Collection Time: 02/05/20  9:06 PM   Result Value Ref Range    Sodium 132 (L) 136 - 145 mmol/L    Potassium 5.1 3.5 - 5.1 mmol/L    Chloride 95 (L) 97 - 108 mmol/L    CO2 31 21 - 32 mmol/L    Anion gap 6 5 - 15 mmol/L    Glucose 216 (H) 65 - 100 mg/dL    BUN 63 (H) 6 - 20 MG/DL    Creatinine 1.82 (H) 0.70 - 1.30 MG/DL    BUN/Creatinine ratio 35 (H) 12 - 20      GFR est AA 44 (L) >60 ml/min/1.73m2    GFR est non-AA 37 (L) >60 ml/min/1.73m2    Calcium 9.0 8.5 - 10.1 MG/DL    Bilirubin, total 0.6 0.2 - 1.0 MG/DL    ALT (SGPT) 24 12 - 78 U/L    AST (SGOT) 37 15 - 37 U/L    Alk. phosphatase 602 (H) 45 - 117 U/L    Protein, total 6.6 6.4 - 8.2 g/dL    Albumin 3.0 (L) 3.5 - 5.0 g/dL    Globulin 3.6 2.0 - 4.0 g/dL    A-G Ratio 0.8 (L) 1.1 - 2.2     CK W/ REFLX CKMB    Collection Time: 02/05/20  9:06 PM   Result Value Ref Range    CK 96 39 - 308 U/L   TROPONIN I    Collection Time: 02/05/20  9:06 PM   Result Value Ref Range    Troponin-I, Qt. <0.05 <0.05 ng/mL   LACTIC ACID    Collection Time: 02/05/20  9:06 PM   Result Value Ref Range    Lactic acid 1.8 0.4 - 2.0 MMOL/L   POC TROPONIN-I    Collection Time: 02/05/20  9:06 PM   Result Value Ref Range    Troponin-I (POC) <0.04 0.00 - 0.08 ng/mL   NT-PRO BNP    Collection Time: 02/05/20  9:06 PM   Result Value Ref Range    NT pro-BNP 3,089 (H) <450 PG/ML       Radiologic Studies -   XR CHEST PORT   Final Result   IMPRESSION:   CHF pattern is suspected. .           CT Results  (Last 48 hours)    None        CXR Results  (Last 48 hours)               02/05/20 2136  XR CHEST PORT Final result    Impression:  IMPRESSION:   CHF pattern is suspected. .           Narrative:  INDICATION:  Shortness of breath        EXAM: Chest single view. COMPARISON: 12/1/2019. FINDINGS: A single frontal view of the chest at 2103 hours shows perihilar   parenchymal opacities new since the prior study, with a poor inspiratory   effort. .  The heart, mediastinum and pulmonary vasculature are stable with   cardiomegaly . The bony thorax is unremarkable for age. .                   Medical Decision Making   I am the first provider for this patient. I reviewed the vital signs, available nursing notes, past medical history, past surgical history, family history and social history. Vital Signs-Reviewed the patient's vital signs.   Patient Vitals for the past 12 hrs: Temp Pulse Resp BP SpO2   02/05/20 2355     95 %   02/05/20 2348  (!) 54  133/48    02/05/20 2345  (!) 55 16 133/48 (!) 86 %   02/05/20 2330  (!) 54 14 145/69 93 %   02/05/20 2315  (!) 54 12 135/56 94 %   02/05/20 2300  (!) 53 14 127/56 99 %   02/05/20 2245  (!) 50 12 135/53 99 %   02/05/20 2111     90 %   02/05/20 2051 97.9 °F (36.6 °C) (!) 57 19 110/70 91 %       Pulse Oximetry Analysis - 90% on 100%, BiPAP    Cardiac Monitor:   Rate: 57 bpm  Rhythm: Atrial fibrillation (chronic)    Records Reviewed: Nursing Notes, Old Medical Records, Previous electrocardiograms, Ambulance Run Sheet, Previous Radiology Studies and Previous Laboratory Studies    Provider Notes (Medical Decision Making):     DDX:  CHF exacerbation, pulmonary edema, pneumonia, influenza, arrhythmia, electrolyte abnormality, anasarca    Plan:  EKG, labs, continue BiPAP, DuoNeb, steroids, chest x-ray, Bumex    Impression:  chf exacerbation, pulmonary edema    ED Course:   Initial assessment performed. The patients presenting problems have been discussed, and they are in agreement with the care plan formulated and outlined with them. I have encouraged them to ask questions as they arise throughout their visit. I reviewed our electronic medical record system for any past medical records that were available that may contribute to the patients current condition, the nursing notes and and vital signs from today's visit    Nursing notes will be reviewed as they become available in realtime while the pt has been in the ED. Mook Peace MD    EKG interpretation 2110: Atrial fibrillation with slow ventricular response, normal Axis, rate 55; NC not calculated, QRS 96, QTc 411; no acute ischemia; interpreted by Mook Peace MD    I personally reviewed pt's imaging. Official read by radiology noted above. Mook Peace MD    PROGRESS NOTE:  11:09 PM  Pt noted to be much more comfortable. Will trial off of CPAP with Ventimask. Chest x-ray shows pulmonary edema pattern. Will provide Bumex as well. Rad Betts MD      CONSULT NOTE:   12:20 AM  Rad Betts MD spoke with Dr. Neris Chambers,   Specialty: Bertin Chambers due to chf exacerbation, anasarca, hypoxia. Discussed pt's HPI and available diagnostic results thus far. Expressed concerns for needed admission. Consultant will evaluate for admission. Rad Betts MD    ADMISSION NOTE:  12:20 AM  Patient is being admitted to the hospital by Dr. Neris Chambers. The results of their tests and reasons for their admission have been discussed with them and/or available family. They convey agreement and understanding for the need to be admitted and for their admission diagnosis. Rad Betts MD             Critical Care Time:     none      Diagnosis     Clinical Impression:   1. Acute on chronic congestive heart failure, unspecified heart failure type ([address]    2. Pulmonary edema cardiac cause ([address]    3. Hypoxia    4. Acute on chronic renal insufficiency        PLAN:  1. Admit to hospitalist          This note will not be viewable in [address]

## 2020-02-06 NOTE — CONSULTS
Palliative Medicine Consult  Jose: [phone] ([phone])    Patient Name: Juan Manuel Galindo  YOB: 1945    Date of Initial Consult: 2/6/2020  Reason for Consult: End Stage Disease  Requesting Provider: Lizzy Singh MD  Primary Care Physician: Brittany Patel MD     SUMMARY:   Juan Manuel Galindo is a 75 y.o. with a past history of widely metastatic renal cell carcinoma, CAD, DM, HTN, Peripheral neuropathy, and chronic a-fib, who was admitted on 2/5/2020 from home with a diagnosis of Acute on chronic respiratory failure with hypoxia and anasarca. 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  He has been  to his wife Nikhil Birmingham for 23 years. They have no children, but Nikhil Birmingham has a daughter who lives in [facility] [address]:   1. DNR Discussion  2. Debility  3. Frailty  4. Anorexia  5. Edema       PLAN:   1. Met with Mr José along with his wife and her daughter  2. They are a charming family, Mrs Angella Husain is quite an entertaining lady, but she is facing caregiver strain with his progressive decline. In her words \"I have not slept in 2 years!\"  3. She is quite worried about her ability to continue to care for him, as his health is getting much worse as time goes on. She shared that \"the cancer is eating him up, and its not getting any better\"  4. He is essentially bedbound now- and most recently he was able to get from bed to scooter if he had a doctors appointment, but sometimes if he was gone for over an hour, she could not get him off the scooter and back to bed (he would stay on the scooter the entire time he was gone until he got back home and into bed)  5.  Rehab is not an option for them, the [place] sent him there once, but as his baseline is bed to chair existence, with most of his time in bed, Mrs Cherie Vale shared \"I am not sure what they thought they were going to accomplish!\"  6. We talked about his wishes at the end of life, and Mr Cherie Vale is adamants that he wants to remain a full code, and for his body to be kept alive as long as possible. Mrs Cherie Vale talked with him at length, reminding him that he has never wanted to be kept alive on machines, and with the cancer, he will not have any quality of life if this is the case  7. Outside the room, she shared with me that she thinks he is worried about her, because if he dies, she will loose her financial benefits. 8. I let them know we will follow up tomorrow to talk more, and encouraged him to think about this and talk it over with his wife  5. Initial consult note routed to primary continuity provider and/or primary health care team members  10. Communicated plan of care with: Palliative [facility] Team     GOALS OF CARE / TREATMENT PREFERENCES:     GOALS OF CARE:       TREATMENT PREFERENCES:   Code Status: Full Code    Advance Care Planning:  [x] The [facility] Interdisciplinary Team has updated the ACP Navigator with Health Care Decision Maker and Patient Capacity      Primary Decision Maker: Whitney Larson - [phone]    Advance Care Planning 2/7/2020   Patient's Healthcare Decision Maker is: Legal Next of Kin   Confirm Advance Directive None   Patient Would Like to Complete Advance Directive No       Medical Interventions: Full interventions     Other Instructions: Other:    As far as possible, the palliative care team has discussed with patient / health care proxy about goals of care / treatment preferences for patient.      HISTORY:     History obtained from: chart, patient, wife    CHIEF COMPLAINT: none    HPI/SUBJECTIVE:    The patient is:   [x] Verbal and participatory  [] Non-participatory due to:   Participated in conversation some, but generally let his wife speak unless he was asked a direct yes/no question     Clinical Pain Assessment (nonverbal scale for severity on nonverbal patients):   Clinical Pain Assessment  Severity: 0          Duration: for how long has pt been experiencing pain (e.g., 2 days, 1 month, years)  Frequency: how often pain is an issue (e.g., several times per day, once every few days, constant)     FUNCTIONAL ASSESSMENT:     Palliative Performance Scale (PPS):  PPS: 30       PSYCHOSOCIAL/SPIRITUAL SCREENING:     Palliative IDT has assessed this patient for cultural preferences / practices and a referral made as appropriate to needs (Cultural Services, Patient Advocacy, Ethics, etc.)    Any spiritual / Congregational concerns:  [] Yes /  [x] No    Caregiver Burnout:  [] Yes /  [x] No /  [] No Caregiver Present      Anticipatory grief assessment:   [x] Normal  / [] Maladaptive       ESAS Anxiety: Anxiety: 0    ESAS Depression:          REVIEW OF SYSTEMS:     Positive and pertinent negative findings in ROS are noted above in HPI. The following systems were [x] reviewed / [] unable to be reviewed as noted in HPI  Other findings are noted below. Systems: constitutional, ears/nose/mouth/throat, respiratory, gastrointestinal, genitourinary, musculoskeletal, integumentary, neurologic, psychiatric, endocrine. Positive findings noted below. Modified ESAS Completed by: provider   Fatigue: 4       Pain: 0   Anxiety: 0 Nausea: 0   Anorexia: 5 Dyspnea: 4                    PHYSICAL EXAM:     From RN flowsheet:  Wt Readings from Last 3 Encounters:   02/07/20 (!) 361 lb 8.9 oz (164 kg)   01/28/14 (!) 375 lb (170.1 kg)     Blood pressure 129/59, pulse 89, temperature 97.6 °F (36.4 °C), resp. rate 19, height 5' 11\" (1.803 m), weight (!) 361 lb 8.9 oz (164 kg), SpO2 94 %.     Pain Scale 1: Numeric (0 - 10)  Pain Intensity 1: 6  Pain Onset 1: chronic  Pain Location 1: Back  Pain Orientation 1: Mid  Pain Description 1: Constant  Pain Intervention(s) 1: Medication (see MAR), Repositioned  Last bowel movement, if known:     Constitutional: alert, minimally participatory, obese but appears frail and chronically ill  Eyes: pupils equal, anicteric  ENMT: no nasal discharge, moist mucous membranes  Cardiovascular: very edematous in his legs and feet  Respiratory: breathing slightly labored,   Gastrointestinal: soft non-tender, +bowel sounds  Musculoskeletal: no deformity, no tenderness to palpation  Skin: warm, dry  Neurologic: following commands, moving all extremities  Psychiatric: full affect, no hallucinations  Other:       HISTORY:     Active Problems:    Renal cell carcinoma ([facility] (12/1/2019)      Paroxysmal A-fib ([facility]) (12/1/2019)      Acute on chronic respiratory failure with hypoxia ([facility]) (2/6/2020)      Anasarca (2/6/2020)      Acute on chronic diastolic heart failure ([facility]) (2/6/2020)      Chronic pain (2/6/2020)      HTN (hypertension) (2/6/2020)      Type 2 diabetes mellitus ([facility] (2/6/2020)      CAD (coronary artery disease) (2/6/2020)      BPH (benign prostatic hyperplasia) (2/6/2020)      Hyperlipidemia (2/6/2020)      Gout (2/6/2020)      Past Medical History:   Diagnosis Date    CAD (coronary artery disease)     Cancer ([facility]     RENAL     CKD (chronic kidney disease) stage 3, GFR 30-59 ml/min ([facility])     Diabetes ([facility]     TYPE 2    Hypertension     Peripheral neuropathy     Sleep disorder     SLEEP APNEA      Past Surgical History:   Procedure Laterality Date    CARDIAC SURG PROCEDURE UNLIST      HX NEPHROSTOMY      HX ORTHOPAEDIC      2 KNEE REPLACEMENTS    IR STENT PLACEMENT        No family history on file. History reviewed, no pertinent family history.   Social History     Tobacco Use    Smoking status: Never Smoker    Smokeless tobacco: Never Used   Substance Use Topics    Alcohol use: Never     Frequency: Never     Allergies   Allergen Reactions    Allopurinol Rash    Gabapentin Other (comments)     nightmares    Paradione Hives      Current Facility-Administered Medications   Medication Dose Route Frequency    insulin glargine (LANTUS) injection 40 Units  40 Units SubCUTAneous DAILY    bumetanide (BUMEX) injection 3 mg  3 mg IntraVENous Q8H    hydrALAZINE (APRESOLINE) 20 mg/mL injection 20 mg  20 mg IntraVENous Q6H PRN    cyanocobalamin (VITAMIN B12) tablet 1,000 mcg  1,000 mcg Oral BID    [START ON 9/1/6171] folic acid (FOLVITE) tablet 2 mg  2 mg Oral 7am    [START ON 2/8/2020] pantoprazole (PROTONIX) tablet 40 mg  40 mg Oral DAILY    potassium chloride SR (KLOR-CON 10) tablet 30 mEq  30 mEq Oral BID    [START ON 2/8/2020] pyridoxine (vitamin B6) (VITAMIN B-6) tablet 100 mg  100 mg Oral DAILY    [START ON 2/8/2020] cholecalciferol (VITAMIN D3) (1000 Units /25 mcg) tablet 2 Tab  2,000 Units Oral DAILY    insulin lispro (HUMALOG) injection 5 Units  5 Units SubCUTAneous TIDAC    **Can pt supply own Cabozantinib 40 mg tab (Cabometyx, Cometriq )    1 Each Other DAILY    [START ON 2/8/2020] metoprolol tartrate (LOPRESSOR) tablet 6.25 mg  6.25 mg Oral BID    sodium chloride (NS) flush 5-40 mL  5-40 mL IntraVENous Q8H    sodium chloride (NS) flush 5-40 mL  5-40 mL IntraVENous PRN    acetaminophen (TYLENOL) tablet 650 mg  650 mg Oral Q6H PRN    ondansetron (ZOFRAN) injection 4 mg  4 mg IntraVENous Q4H PRN    [Held by provider] apixaban (ELIQUIS) tablet 5 mg  5 mg Oral Q12H    atorvastatin (LIPITOR) tablet 40 mg  40 mg Oral QHS    . PHARMACY TO SUBSTITUTE PER PROTOCOL (Reordered from: cabozantinib 40 mg tab)    Per Protocol    febuxostat (ULORIC) tablet 80 mg  80 mg Oral DAILY    finasteride (PROSCAR) tablet 5 mg  5 mg Oral QHS    oxyCODONE-acetaminophen (PERCOCET) 5-325 mg per tablet 1 Tab  1 Tab Oral Q6H PRN    terazosin (HYTRIN) capsule 10 mg  10 mg Oral QHS    insulin lispro (HUMALOG) injection   SubCUTAneous AC&HS    glucose chewable tablet 16 g  4 Tab Oral PRN    dextrose (D50W) injection syrg 12.5-25 g  12.5-25 g IntraVENous PRN    glucagon (GLUCAGEN) injection 1 mg  1 mg IntraMUSCular PRN    albuterol-ipratropium (DUO-NEB) 2.5 MG-0.5 MG/3 ML  3 mL Nebulization Q6H RT    neomycin-bacitracin-polymyxin (NEOSPORIN) ointment   Topical BID          LAB AND IMAGING FINDINGS:     Lab Results   Component Value Date/Time    WBC 7.3 02/07/2020 12:17 AM    HGB 8.6 (L) 02/07/2020 10:52 AM    PLATELET 331 53/64/3079 12:17 AM     Lab Results   Component Value Date/Time    Sodium 133 (L) 02/07/2020 12:17 AM    Potassium 4.8 02/07/2020 12:17 AM    Chloride 96 (L) 02/07/2020 12:17 AM    CO2 30 02/07/2020 12:17 AM    BUN 65 (H) 02/07/2020 12:17 AM    Creatinine 1.82 (H) 02/07/2020 12:17 AM    Calcium 8.6 02/07/2020 12:17 AM    Calcium 8.5 02/07/2020 12:17 AM    Magnesium 2.7 (H) 12/05/2019 03:30 AM      Lab Results   Component Value Date/Time    AST (SGOT) 26 02/07/2020 12:17 AM    Alk. phosphatase 505 (H) 02/07/2020 12:17 AM    Protein, total 5.7 (L) 02/07/2020 12:17 AM    Albumin 2.6 (L) 02/07/2020 12:17 AM    Globulin 3.1 02/07/2020 12:17 AM     No results found for: INR, PTMR, PTP, PT1, PT2, APTT, INREXT, INREXT   No results found for: IRON, FE, TIBC, IBCT, PSAT, FERR   No results found for: PH, PCO2, PO2  No components found for: GLPOC   No results found for: CPK, CKMB             Total time:   Counseling / coordination time, spent as noted above:   > 50% counseling / coordination?:     Prolonged service was provided for  []30 min   []75 min in face to face time in the presence of the patient, spent as noted above. Time Start:   Time End:   Note: this can only be billed with 40881 (initial) or 93181 (follow up). If multiple start / stop times, list each separately.

## 2020-02-06 NOTE — PROGRESS NOTES
Transitional Care Team: Initial HUG Note    Date of Assessment: 02/06/20  Time of Assessment:  10:53 AM    [facility] Gunnar Salazar is a 75 y.o. male inpatient at  [facility]. Assessment & Plan   Pt alert but with NRB mask in place. Minimal discussion to maintain optimum oxygenation. Will need revisit once oxygen demand is more stabilized. Pt is morbidly obese, and s/p nephrectomy for renal cell cancer, and has urology and nephrology consults         Primary Diagnosis: heart failure    Advance Directive:  . See ACP note from me. Current Code Status:  full    Referral to Hospice/ Palliative Care Appropriate: not yet. Await to see if progresses. Awareness of Medical Conditions: (Trajectory of illness and pts expectations). await progress for more clear idea of his trajectory of illness    Discharge Needs: (to include safety issues) await hospital course    Barriers Identified: none yet    Patient is willing to go to SNF/Inpatient Rehab if recommended: yes    Medication Review:  Await AVS.    Can patient afford medications:  Yes. Current med list    Patient is Compliant with Medication regimen:yes    Who manages medications at home: self    Best Patient Contact Number: [phone]      G (Healthy Understanding of Goals) program introduced to patient/family. The Transitional Care Team bridges the gaps in care and education surrounding discharge from the acute care facility. The objective is to empower the patient and family in taking a proactive role in preventing readmission within the first thirty days after discharge. The team is also involved in the efforts to reduce readmission to the acute care setting after stabilization and discharge from the acute care environment either to skilled nursing facilities or community. [facility] RN/NP will return with [facility] Calendar/ follow up appointments/ Ambulatory Nurse Navigator name and contact information when the patient is ready for discharge.     No future appointments. Non-[facility] follow up appointment(s): none yet    [facility]: call information given to on discharge calendar      Patient education focused on readmission zones as described as: The Red Zone: High risk for readmission, days 1-21  The Yellow Zone: Moderate  risk for readmission, days 22-29   The Green Zone: Lower risk for readmission, days                30 and after    [facility] Team will attempt to follow the patient from a distance while inpatient as well as be available for further transition/disposition needs. The [facility] team will continue to offer support during the 30- 90 day discharge from acute care setting. Will notify Ambulatory {Blank Single Select Template:20061[de-identified] \"PIA   RN.     Past Medical History:   Diagnosis Date    CAD (coronary artery disease)     Cancer ([facility]     RENAL     Diabetes ([facility]     TYPE 2    Hypertension     Peripheral neuropathy     Sleep disorder     SLEEP APNEA

## 2020-02-06 NOTE — ED TRIAGE NOTES
Patient was just released from cardiac rehab and experiencing SOB/respiratory distress. RA reading pta was 69, ems placed on CPAP, oxygen is up 93%. Was in cardiac rehab for chest pain prior to stress test being done. Patient lost brother within month and other brother states he has been depressed.

## 2020-02-06 NOTE — CONSULTS
[facility]  Pulmonary, Critical Care, and Sleep Medicine    Initial Patient Consult    Name: Binh [facility] Marie MRN: [id number]   : 1945 Hospital: [facility]   Date: 2020        IMPRESSION:   · Acute respiratory failure  · Pulmonary edema  · Bilateral pulmonary nodules seen on a chest ct from 19 (2 MONTHS AGO)  · End stage metastatic renal cell carcinoma      RECOMMENDATIONS:   · O2  · Diureses  · Pt has been told by oncologist at [facility] 2 years ago that he has widely metastatic disease  · Pulmonary nodules seen on a chest ct 2 MONTHS OLD probably metastatic disease, this the least of this pt's problems  · Recommend palliative care/hospice consult  · Recommend re address code status  · This pt current overall condition, quality of life and performance status is very poor  · His overall prognosis is very poor and this pt is obviously not a candidate for heroics     Subjective: This patient has been seen and evaluated at the request of Dr. Franne Lefort for bilateral pulmonary nodules. Patient is a 75 y.o. male morbidly obese, bed ridden with end stage renal cell carcinoma admitted with pulmonary edema and sob  Given diuretics with acceptable results  Pt today in no severe distress, on NC O2      Past Medical History:   Diagnosis Date    CAD (coronary artery disease)     Cancer ([facility] [address]     RENAL     CKD (chronic kidney disease) stage 3, GFR 30-59 ml/min [facility])     Diabetes ([facility] [address]     TYPE 2    Hypertension     Peripheral neuropathy     Sleep disorder     SLEEP APNEA      Past Surgical History:   Procedure Laterality Date    CARDIAC SURG PROCEDURE UNLIST      HX NEPHROSTOMY      HX ORTHOPAEDIC      2 KNEE REPLACEMENTS    IR STENT PLACEMENT        Prior to Admission medications    Medication Sig Start Date End Date Taking? Authorizing Provider   metoprolol tartrate (LOPRESSOR) 100 mg IR tablet Take 1 Tab by mouth two (2) times a day.  19   Doretha Flynn, MD   apixaban (ELIQUIS) 5 mg tablet Take 1 Tab by mouth every twelve (12) hours. 12/5/19   Noel Benton MD   pyridoxine, vitamin B6, (VITAMIN B-6) 50 mg cap Take 1 Cap by mouth daily. Provider, Historical   cabozantinib 40 mg tab Take 40 mg by mouth daily. Provider, Historical   raNITIdine (ZANTAC) 150 mg tablet Take 150 mg by mouth two (2) times a day. Provider, Historical   cyanocobalamin (VITAMIN B12) 250 mcg tablet Take 1,000 mcg by mouth two (2) times a day. Provider, Historical   cholecalciferol (VITAMIN D3) 25 mcg (1,000 unit) cap Take 2,000 Units by mouth daily. Provider, Historical   multivitamin (ONE A DAY) tablet Take 1 Tab by mouth daily. Provider, Historical   nitroglycerin (NITROSTAT) 0.4 mg SL tablet 0.4 mg by SubLINGual route every five (5) minutes as needed for Chest Pain. Up to 3 doses. Provider, Historical   folic acid (FOLVITE) 1 mg tablet Take 3 mg by mouth nightly. Provider, Historical   oxyCODONE-acetaminophen (PERCOCET) 5-325 mg per tablet Take 1 Tab by mouth every six (6) hours as needed for Pain. Provider, Historical   torsemide (DEMADEX) 20 mg tablet Take 60 mg by mouth two (2) times a day. Provider, Historical   finasteride (PROSCAR) 5 mg tablet Take 5 mg by mouth nightly. Provider, Historical   terazosin (HYTRIN) 10 mg capsule Take 10 mg by mouth nightly. Provider, Historical   atorvastatin (LIPITOR) 80 mg tablet Take 40 mg by mouth nightly. Provider, Historical   docusate sodium (COLACE) 100 mg capsule Take 100 mg by mouth three (3) times daily as needed. Provider, Historical   sennosides (SENNA) 8.6 mg cap Take 1 Cap by mouth nightly. Provider, Historical   febuxostat (ULORIC) 40 mg tab tablet Take 80 mg by mouth daily. Provider, Historical   potassium chloride SR (KLOR-CON 10) 10 mEq tablet Take 30 mEq by mouth two (2) times a day.     Provider, Historical   insulin glargine (LANTUS,BASAGLAR) 100 unit/mL (3 mL) inpn 50 Units by SubCUTAneous route daily. Provider, Historical   insulin CONCENTRATED regular (HUMULIN R U-500) 500 unit/mL soln 200 Units by SubCUTAneous route Daily (before breakfast). Provider, Historical   insulin CONCENTRATED regular (HUMULIN R U-500) 500 unit/mL soln 50 Units by SubCUTAneous route Daily (before dinner). Provider, Historical     Allergies   Allergen Reactions    Allopurinol Rash    Gabapentin Other (comments)     nightmares    Paradione Hives      Social History     Tobacco Use    Smoking status: Never Smoker    Smokeless tobacco: Never Used   Substance Use Topics    Alcohol use: Never     Frequency: Never      No family history on file.      Current Facility-Administered Medications   Medication Dose Route Frequency    sodium chloride (NS) flush 5-40 mL  5-40 mL IntraVENous Q8H    [Held by provider] apixaban (ELIQUIS) tablet 5 mg  5 mg Oral Q12H    atorvastatin (LIPITOR) tablet 40 mg  40 mg Oral QHS    febuxostat (ULORIC) tablet 80 mg  80 mg Oral DAILY    finasteride (PROSCAR) tablet 5 mg  5 mg Oral QHS    metoprolol tartrate (LOPRESSOR) tablet 25 mg  25 mg Oral BID    terazosin (HYTRIN) capsule 10 mg  10 mg Oral QHS    insulin lispro (HUMALOG) injection   SubCUTAneous AC&HS    famotidine (PEPCID) tablet 20 mg  20 mg Oral BID    albuterol-ipratropium (DUO-NEB) 2.5 MG-0.5 MG/3 ML  3 mL Nebulization Q6H RT    insulin glargine (LANTUS) injection 35 Units  35 Units SubCUTAneous DAILY    bumetanide (BUMEX) injection 2 mg  2 mg IntraVENous Q12H    metOLazone (ZAROXOLYN) tablet 5 mg  5 mg Oral ONCE    neomycin-bacitracin-polymyxin (NEOSPORIN) ointment   Topical BID       Review of Systems:  A comprehensive review of systems was negative except for: Respiratory: positive for dyspnea on exertion    Objective:   Vital Signs:    Visit Vitals  /90 (BP 1 Location: Left arm, BP Patient Position: At rest)   Pulse 71   Temp 98.6 °F (37 °C)   Resp 15   Ht 5' 11\" (1.803 m)   Wt (!) 165 kg (363 lb 12.1 oz)   SpO2 93%   BMI 50.73 kg/m²       O2 Device: Ventimask   O2 Flow Rate (L/min): 12 l/min   Temp (24hrs), Av.2 °F (36.8 °C), Min:97.9 °F (36.6 °C), Max:98.6 °F (37 °C)       Intake/Output:   Last shift:      No intake/output data recorded. Last 3 shifts:  1901 -  0700  In: -   Out: 275 [Urine:275]    Intake/Output Summary (Last 24 hours) at 2020 1353  Last data filed at 2020 0541  Gross per 24 hour   Intake    Output 275 ml   Net -275 ml      Physical Exam:   General:  Alert, cooperative, no distress, appears stated age. Head:  Normocephalic, without obvious abnormality, atraumatic. Eyes:  Conjunctivae/corneas clear. PERRL, EOMs intact. Nose: Nares normal. Septum midline. Mucosa normal. No drainage or sinus tenderness. Throat: Lips, mucosa, and tongue normal. Teeth and gums normal.   Neck: Supple, symmetrical, trachea midline, no adenopathy, thyroid: no enlargment/tenderness/nodules, no carotid bruit and no JVD. Back:   Symmetric, no curvature. ROM normal.   Lungs: Few ronchi bilaterally. Chest wall:  No tenderness or deformity. Heart:  Regular rate and rhythm, S1, S2 normal, no murmur, click, rub or gallop. Abdomen:   Soft, non-tender. Bowel sounds normal. No masses,  No organomegaly. Extremities: Extremities normal, atraumatic, no cyanosis, ++ edema. Pulses: 2+ and symmetric all extremities.    Skin: Skin color, texture, turgor normal. No rashes or lesions   Lymph nodes: Cervical, supraclavicular, and axillary nodes normal.   Neurologic: Grossly nonfocal     Data review:     Recent Results (from the past 24 hour(s))   EKG, 12 LEAD, INITIAL    Collection Time: 20  9:02 PM   Result Value Ref Range    Ventricular Rate 55 BPM    Atrial Rate 30 BPM    QRS Duration 96 ms    Q-T Interval 430 ms    QTC Calculation (Bezet) 411 ms    Calculated R Axis -23 degrees    Calculated T Axis 54 degrees    Diagnosis       Atrial fibrillation with slow ventricular response  Low voltage QRS  When compared with ECG of 02-DEC-2019 00:14,  Nonspecific T wave abnormality no longer evident in Inferior leads  Nonspecific T wave abnormality, improved in Anterolateral leads     CBC WITH AUTOMATED DIFF    Collection Time: 02/05/20  9:06 PM   Result Value Ref Range    WBC 8.4 4.1 - 11.1 K/uL    RBC 3.53 (L) 4.10 - 5.70 M/uL    HGB 10.2 (L) 12.1 - 17.0 g/dL    HCT 33.6 (L) 36.6 - 50.3 %    MCV 95.2 80.0 - 99.0 FL    MCH 28.9 26.0 - 34.0 PG    MCHC 30.4 30.0 - 36.5 g/dL    RDW 18.9 (H) 11.5 - 14.5 %    PLATELET 588 361 - 873 K/uL    MPV 9.0 8.9 - 12.9 FL    NRBC 1.0 (H) 0  WBC    ABSOLUTE NRBC 0.08 (H) 0.00 - 0.01 K/uL    NEUTROPHILS 68 32 - 75 %    LYMPHOCYTES 16 12 - 49 %    MONOCYTES 10 5 - 13 %    EOSINOPHILS 4 0 - 7 %    BASOPHILS 1 0 - 1 %    IMMATURE GRANULOCYTES 1 (H) 0.0 - 0.5 %    ABS. NEUTROPHILS 5.7 1.8 - 8.0 K/UL    ABS. LYMPHOCYTES 1.3 0.8 - 3.5 K/UL    ABS. MONOCYTES 0.8 0.0 - 1.0 K/UL    ABS. EOSINOPHILS 0.3 0.0 - 0.4 K/UL    ABS. BASOPHILS 0.1 0.0 - 0.1 K/UL    ABS. IMM. GRANS. 0.1 (H) 0.00 - 0.04 K/UL    DF AUTOMATED     METABOLIC PANEL, COMPREHENSIVE    Collection Time: 02/05/20  9:06 PM   Result Value Ref Range    Sodium 132 (L) 136 - 145 mmol/L    Potassium 5.1 3.5 - 5.1 mmol/L    Chloride 95 (L) 97 - 108 mmol/L    CO2 31 21 - 32 mmol/L    Anion gap 6 5 - 15 mmol/L    Glucose 216 (H) 65 - 100 mg/dL    BUN 63 (H) 6 - 20 MG/DL    Creatinine 1.82 (H) 0.70 - 1.30 MG/DL    BUN/Creatinine ratio 35 (H) 12 - 20      GFR est AA 44 (L) >60 ml/min/1.73m2    GFR est non-AA 37 (L) >60 ml/min/1.73m2    Calcium 9.0 8.5 - 10.1 MG/DL    Bilirubin, total 0.6 0.2 - 1.0 MG/DL    ALT (SGPT) 24 12 - 78 U/L    AST (SGOT) 37 15 - 37 U/L    Alk.  phosphatase 602 (H) 45 - 117 U/L    Protein, total 6.6 6.4 - 8.2 g/dL    Albumin 3.0 (L) 3.5 - 5.0 g/dL    Globulin 3.6 2.0 - 4.0 g/dL    A-G Ratio 0.8 (L) 1.1 - 2.2     CK W/ REFLX CKMB    Collection Time: 02/05/20  9:06 PM   Result Value Ref Range    CK 96 39 - 308 U/L   TROPONIN I    Collection Time: 02/05/20  9:06 PM   Result Value Ref Range    Troponin-I, Qt. <0.05 <0.05 ng/mL   LACTIC ACID    Collection Time: 02/05/20  9:06 PM   Result Value Ref Range    Lactic acid 1.8 0.4 - 2.0 MMOL/L   POC TROPONIN-I    Collection Time: 02/05/20  9:06 PM   Result Value Ref Range    Troponin-I (POC) <0.04 0.00 - 0.08 ng/mL   NT-PRO BNP    Collection Time: 02/05/20  9:06 PM   Result Value Ref Range    NT pro-BNP 3,089 (H) <450 PG/ML   URINALYSIS W/ REFLEX CULTURE    Collection Time: 02/06/20  5:28 AM   Result Value Ref Range    Color YELLOW/STRAW      Appearance CLEAR CLEAR      Specific gravity 1.016 1.003 - 1.030      pH (UA) 5.5 5.0 - 8.0      Protein TRACE (A) NEG mg/dL    Glucose NEGATIVE  NEG mg/dL    Ketone NEGATIVE  NEG mg/dL    Bilirubin NEGATIVE  NEG      Blood LARGE (A) NEG      Urobilinogen 1.0 0.2 - 1.0 EU/dL    Nitrites NEGATIVE  NEG      Leukocyte Esterase NEGATIVE  NEG      WBC 0-4 /hpf    RBC  /hpf    Epithelial cells FEW /lpf    Bacteria NEGATIVE  /hpf    UA:UC IF INDICATED CULTURE NOT INDICATED BY UA RESULT      Hyaline cast 26-96 /lpf   METABOLIC PANEL, COMPREHENSIVE    Collection Time: 02/06/20  5:34 AM   Result Value Ref Range    Sodium 132 (L) 136 - 145 mmol/L    Potassium 5.0 3.5 - 5.1 mmol/L    Chloride 95 (L) 97 - 108 mmol/L    CO2 30 21 - 32 mmol/L    Anion gap 7 5 - 15 mmol/L    Glucose 217 (H) 65 - 100 mg/dL    BUN 65 (H) 6 - 20 MG/DL    Creatinine 1.73 (H) 0.70 - 1.30 MG/DL    BUN/Creatinine ratio 38 (H) 12 - 20      GFR est AA 47 (L) >60 ml/min/1.73m2    GFR est non-AA 39 (L) >60 ml/min/1.73m2    Calcium 8.9 8.5 - 10.1 MG/DL    Bilirubin, total 0.6 0.2 - 1.0 MG/DL    ALT (SGPT) 22 12 - 78 U/L    AST (SGOT) 33 15 - 37 U/L    Alk.  phosphatase 548 (H) 45 - 117 U/L    Protein, total 6.2 (L) 6.4 - 8.2 g/dL    Albumin 2.8 (L) 3.5 - 5.0 g/dL    Globulin 3.4 2.0 - 4.0 g/dL    A-G Ratio 0.8 (L) 1.1 - 2.2     CBC WITH AUTOMATED DIFF Collection Time: 02/06/20  5:34 AM   Result Value Ref Range    WBC 7.2 4.1 - 11.1 K/uL    RBC 3.34 (L) 4.10 - 5.70 M/uL    HGB 9.5 (L) 12.1 - 17.0 g/dL    HCT 31.9 (L) 36.6 - 50.3 %    MCV 95.5 80.0 - 99.0 FL    MCH 28.4 26.0 - 34.0 PG    MCHC 29.8 (L) 30.0 - 36.5 g/dL    RDW 18.6 (H) 11.5 - 14.5 %    PLATELET 574 979 - 912 K/uL    MPV 9.1 8.9 - 12.9 FL    NRBC 1.4 (H) 0  WBC    ABSOLUTE NRBC 0.10 (H) 0.00 - 0.01 K/uL    NEUTROPHILS 68 32 - 75 %    LYMPHOCYTES 17 12 - 49 %    MONOCYTES 10 5 - 13 %    EOSINOPHILS 3 0 - 7 %    BASOPHILS 1 0 - 1 %    IMMATURE GRANULOCYTES 1 (H) 0.0 - 0.5 %    ABS. NEUTROPHILS 4.9 1.8 - 8.0 K/UL    ABS. LYMPHOCYTES 1.2 0.8 - 3.5 K/UL    ABS. MONOCYTES 0.7 0.0 - 1.0 K/UL    ABS. EOSINOPHILS 0.2 0.0 - 0.4 K/UL    ABS. BASOPHILS 0.1 0.0 - 0.1 K/UL    ABS. IMM.  GRANS. 0.1 (H) 0.00 - 0.04 K/UL    DF AUTOMATED     GLUCOSE, POC    Collection Time: 02/06/20  7:17 AM   Result Value Ref Range    Glucose (POC) 249 (H) 65 - 100 mg/dL    Performed by [facility] (PCT)    GLUCOSE, POC    Collection Time: 02/06/20 10:49 AM   Result Value Ref Range    Glucose (POC) 279 (H) 65 - 100 mg/dL    Performed by Pin[facility] Karma (PCT)        Imaging:  I have personally reviewed the patients radiographs and have reviewed the reports:  CXR: c/w pulmonary edema  CHEST CT from 12/1/19 reviewed        Malia Rawls MD

## 2020-02-06 NOTE — PROGRESS NOTES
Reason for Admission:   Patient came to ed with sob and wheezing. He states he lives in one story home with his wife. He states he was able to feed himself and his wife assisted with bathing and dressing. He denies using home oxygen however per chart family states patient is on oxygen 2 liters nasal cannula. Called wife at home however no answer. He does have a cpap machine at home however states he does not wear it a lot. He has been undergoing chemo therapy for renal cancer. He states [employer] which is a private transportation company takes him to his follow up appointments. Patient gets his medical care from the [place]. RUR Score:   22%               Do you (patient/family) have any concerns for transition/discharge? No              Plan for utilizing home health:   He has used home health services in the past. He was just discharged from [facility] . Current Advanced Directive/Advance Care Plan:  Not on file. Discussed with patient and he will talk with his family about this. Transition of Care Plan:     Patient is being worked up medically at this time. He states he will follow up with his medical care team. He is open to home health if needed. Heart failure bundle letter given to patient . His medical person he sees at the [place] is [facility].. The number is [phone] with extension [phone]. Judit Flaherty RN BSN CRM        [phone]

## 2020-02-06 NOTE — WOUND CARE
Wound care nurse consult from staff nurse for multiple skin issues POA. Patient is a super morbid obese 74 y/o CM admitted with acute resp failure. Past Medical History:  
Diagnosis Date  CAD (coronary artery disease)  Cancer ([facility] [address] RENAL   
 CKD (chronic kidney disease) stage 3, GFR 30-59 ml/min ([facility])  Diabetes ([facility] [address] TYPE 2  
 Hypertension  Peripheral neuropathy  Sleep disorder SLEEP APNEA Past Surgical History:  
Procedure Laterality Date  CARDIAC SURG PROCEDURE UNLIST  HX NEPHROSTOMY  HX ORTHOPAEDIC    
 2 KNEE REPLACEMENTS  
 IR STENT PLACEMENT Patient is 5'11\", 363 lbs and is anasarcic. Wounds: 
1. Left heel Unstageable PI POA 2. Penis trauma/split from penial edema WOUND POA CONDITIONS Wound Gluteal fold/cleft (Active) Number of days: 72 Wound Heel Left Unstageable PI (Active) Dressing Status Removed 2/6/2020  1:28 PM  
Dressing Type Foam 2/6/2020  1:28 PM  
Pressure Injury Unstageable 2/6/2020  1:28 PM  
Wound Length (cm) 2 cm 2/6/2020  1:28 PM  
Wound Width (cm) 4 cm 2/6/2020  1:28 PM  
Wound Surface Area (cm^2) 8 cm^2 2/6/2020  1:28 PM  
Condition of Base Eschar;[place] 2/6/2020  1:28 PM  
Condition of Edges Calloused 2/6/2020  1:28 PM  
Tissue Type Percent Black 70 % 2/6/2020  1:28 PM  
Tissue Type Percent Pink 30 2/6/2020  1:28 PM  
Drainage Amount None 2/6/2020  1:28 PM  
Wound Odor None 2/6/2020  1:28 PM  
Marielos-wound Assessment Intact 2/6/2020  1:28 PM  
Dressing Type Applied Open to air 2/6/2020  1:28 PM  
Procedure Tolerated Well 2/6/2020  1:28 PM  
Number of days: 0 Wound Penis Anterior edema skin split 02/06/20 (Active) Dressing Type Open to air 2/6/2020  1:28 PM  
Non-staged Wound Description Partial thickness 2/6/2020  1:28 PM  
Wound Length (cm) 0.2 cm 2/6/2020  1:28 PM  
Wound Width (cm) 3 cm 2/6/2020  1:28 PM  
Wound Depth (cm) 0.1 cm 2/6/2020  1:28 PM  
Wound Surface Area (cm^2) 0.6 cm^2 2/6/2020  1:28 PM  
 Wound Volume (cm^3) 0.06 cm^3 2/6/2020  1:28 PM  
Condition of Base [place] 2/6/2020  1:28 PM  
Condition of Edges Open 2/6/2020  1:28 PM  
Drainage Amount Scant 2/6/2020  1:28 PM  
Drainage Color Serous 2/6/2020  1:28 PM  
Wound Odor None 2/6/2020  1:28 PM  
Marielos-wound Assessment Edema 2/6/2020  1:28 PM  
Dressing Type Applied Open to air 2/6/2020  1:28 PM  
Procedure Tolerated Well 2/6/2020  1:28 PM  
Number of days: 0 Recommend: 
 
1. Specialty bed: Compella bariatric with air 2. Left heel: daily, paint wound with betadine/povidine, let air dry and leave [place] and floating. 3. Penis skin tear: BID, cleanse with NS moist 4x4 and apply antibacterial ointment, leave [place]. Skin folds are clean, no rashes.  
 
4. Scrotum:denuded irritated skin clean with soap and water, pat dry and apply z-guard zinc cream. 
 
Sophie Conley RN, Brittaney Avalos

## 2020-02-06 NOTE — PROGRESS NOTES
Hospitalist Progress Note    NAME: Paul Monge   :  1945   MRN:  [id number]       Assessment / Plan:  Acute on chronic respiratory failure with hypoxia, baseline 2L   Anasarca  [place] with CKD3  Due to Acute on chronic diastolic heart failure  Continue Ventimask  PRN BiPAP  Given 1mg IV Bumex in ED without diuresis, c/w 2mg IV BID, Monitor I/Os, daily weight and lytes  Nephrology consult  UA negative for proteinuria   Check Bladder scan and renal US  Renal US showed :  1. Left kidney is within normal limits. 2. Patient is status post right nephrectomy. In the right nephrectomy bed, there  is a 4.5 cm nonspecific hypoechoic mass. 3. Urinary bladder is unremarkable.      Hematuria   Penile lesion   uro consulted   Hb stable , monitor H&H q8h as pt on eliquis   Hold eliquis for now     B/l leg redness without warmth and tenderness  Monitor leg swelling as risk for cellulitis, low threshold to start abx     Renal cell carcinoma s/p R Nephrectomy     Chronic A-fib, rate controlled  HTN  Hold eliquis due to hematuria until urology evaluation   Mild bradycardia, doesn't remember the exact dose for metoprolol and claims meds has been adjusted recently  Will continue metoprolol at 25mg PO BID  Pharmacy consult for medrec     Chronic pain   Continue Percocet     Type 2 diabetes mellitus   BS elevated , increase lantus   SSI     CAD (coronary artery disease)     BPH (benign prostatic hyperplasia)  Continue Hytrin and Finesteride     Hyperlipidemia   Continue statins     Gout   Continue Uloric    Morbid obesity      Code Status: Full  Surrogate Decision Maker: Wife     DVT Prophylaxis: Eliquis  GI Prophylaxis: not indicated     Baseline: functional      40 or above Morbid obesity / Body mass index is 50.73 kg/m². Subjective:     Chief Complaint / Reason for Physician Visit  FU CHF . Pt feels SOB   Discussed with RN events overnight.      Review of Systems:  Symptom Y/N Comments  Symptom Y/N Comments Fever/Chills    Chest Pain n    Poor Appetite    Edema y    Cough    Abdominal Pain n    Sputum    Joint Pain     SOB/LOMBARDI y   Pruritis/Rash     Nausea/vomit n   Tolerating PT/OT     Diarrhea    Tolerating Diet     Constipation    Other       Could NOT obtain due to:      Objective:     VITALS:   Last 24hrs VS reviewed since prior progress note. Most recent are:  Patient Vitals for the past 24 hrs:   Temp Pulse Resp BP SpO2   02/06/20 0735 98.2 °F (36.8 °C) 73 22 132/51 93 %   02/06/20 0501 98.2 °F (36.8 °C) 60 20 144/62 94 %   02/06/20 0414  62  154/67    02/06/20 0400  (!) 59 14 145/49 94 %   02/06/20 0355     95 %   02/06/20 0039     90 %   02/06/20 0030  (!) 51 15 143/55 95 %   02/06/20 0015  (!) 54 15 133/61 95 %   02/05/20 2355     95 %   02/05/20 2348  (!) 54  133/48    02/05/20 2345  (!) 55 16 133/48 (!) 86 %   02/05/20 2330  (!) 54 14 145/69 93 %   02/05/20 2315  (!) 54 12 135/56 94 %   02/05/20 2300  (!) 53 14 127/56 99 %   02/05/20 2245  (!) 50 12 135/53 99 %   02/05/20 2111     90 %   02/05/20 2051 97.9 °F (36.6 °C) (!) 57 19 110/70 91 %       Intake/Output Summary (Last 24 hours) at 2/6/2020 0818  Last data filed at 2/6/2020 0541  Gross per 24 hour   Intake    Output 275 ml   Net -275 ml        PHYSICAL EXAM:  General: WD, WN. Alert, cooperative, no acute distress    EENT:  EOMI. Anicteric sclerae. MMM  Resp:  Diminished breath sounds b/l  , no wheezing or rales. No accessory muscle use  CV:  Regular  rhythm, LE  Edema 3+   GI:  Soft, Non distended, Non tender.  +Bowel sounds  Neurologic:  Alert and oriented X 3, normal speech,   Psych:   Good insight. Not anxious nor agitated  Skin:  No rashes.   No jaundice, petichae on b/l legs   Penile lesion   : swollen testicles and hematuria in davis bag     Reviewed most current lab test results and cultures  YES  Reviewed most current radiology test results   YES  Review and summation of old records today    NO  Reviewed patient's current orders and MAR    YES  PMH/SH reviewed - no change compared to H&P  ________________________________________________________________________  Care Plan discussed with:    Comments   Patient x    Family      RN x    Care Manager     Consultant                       x Multidiciplinary team rounds were held today with , nursing, pharmacist and clinical coordinator. Patient's plan of care was discussed; medications were reviewed and discharge planning was addressed. ________________________________________________________________________  Total NON critical care TIME:  35  Minutes    Total CRITICAL CARE TIME Spent:   Minutes non procedure based      Comments   >50% of visit spent in counseling and coordination of care x    ________________________________________________________________________  Shannan Heredia MD     Procedures: see electronic medical records for all procedures/Xrays and details which were not copied into this note but were reviewed prior to creation of Plan. LABS:  I reviewed today's most current labs and imaging studies.   Pertinent labs include:  Recent Labs     02/06/20  0534 02/05/20  2106   WBC 7.2 8.4   HGB 9.5* 10.2*   HCT 31.9* 33.6*    292     Recent Labs     02/06/20  0534 02/05/20  2106   * 132*   K 5.0 5.1   CL 95* 95*   CO2 30 31   * 216*   BUN 65* 63*   CREA 1.73* 1.82*   CA 8.9 9.0   ALB 2.8* 3.0*   TBILI 0.6 0.6   SGOT 33 37   ALT 22 24       Signed: Shannan Heredia MD

## 2020-02-06 NOTE — ED NOTES
Patient started to de-sat because he took his venti-mask off, informed him that he has to keep the mask on in order for oxygen saturation to stay up and not be in respiratory distress.

## 2020-02-06 NOTE — CONSULTS
Palliative Medicine  [phone]    Brief note    Met with patient and his wife  Still a full code but will follow up tomorrow  Wife supports DNR, but for now patient is protesting this    Wife shared that he has never wanted to be kept alive on machines, but now she thinks he is scared to die because she will be \"cut off\" financially when he passess    Full note to follow    Lynn Krishna, ESTEFANY

## 2020-02-06 NOTE — CONSULTS
Nephrology Consult Note     Douglas Solis     [facility]. [facility]              Phone - [phone]   Patient: Kayley Broderick   YOB: 1945    Date- 2/6/2020  MRN: [id number]             REASON FOR CONSULTATION: renal failure  CONSULTING PHYSICIAN:     ADMIT DATE:2/5/2020 PATIENT PCP:Other, Brittany, MD     ASSESSMENT & PLAN:     ckd 3 - likely due to DM and HTN  Edema/ chf  H/o right nephrectomy  Resp. Failure  Anemia  Hyponatremia likely due to CHF  Hematuria  H/o renal cancer  CT abdo showing retroperitoneal lymphnode and osseous metastases  Pulmonary nodule    PLAN-  Continue bumex  Add metolazone  Check CT ABDO without contrast  Consult urology  Check hepatitis panel, spep  Avoid acei or arb  Check urine pr/cr ratio  Check TSH and cortisol            Active Problems:    Renal cell carcinoma (HCC) (12/1/2019)      Paroxysmal A-fib (HCC) (12/1/2019)      Acute on chronic respiratory failure with hypoxia (HCC) (2/6/2020)      Anasarca (2/6/2020)      Acute on chronic diastolic heart failure (HCC) (2/6/2020)      Chronic pain (2/6/2020)      HTN (hypertension) (2/6/2020)      Type 2 diabetes mellitus ([facility] [address] (2/6/2020)      CAD (coronary artery disease) (2/6/2020)      BPH (benign prostatic hyperplasia) (2/6/2020)      Hyperlipidemia (2/6/2020)      Gout (2/6/2020)        [x] High complexity decision making was performed  [x] Patient is at high-risk of decompensation with multiple organ involvement    Subjective:   HPI: Kayley Broderick is a 75 y.o. male. He has pmh of ckd, dm, htn  He is admitted with sob. His cr. 1.7 and bun 65 and na 132 today. He has h/o renal cancer requiring right nephrectomy  He has davis placed - he has hematuria since admission  His PRO BNP is 3089  His cr.  Was 1.6 in dec 2019  He is followed at [address] [place] [address]  He is on torsemide as out pt  He c./o sob at rest  No recent antibiotics use  No hypotension since admission  No h/o NSAIDS use recently  No recent iv contrast exposure  He has hematuria and proteinuria on Urine analysis  No history of nephrolithiasis in the past.   no history of pyelonephritis. no history of hepatitis or jaundice. No h/o  herbal supplements use    c/o sob,   No c/o chest pain,   No c/o nausea or vomiting  No c/o  fever. Review of Systems:      A 11 point review of system was performed today. Pertinent positives and negatives are mentioned in the HPI. The reminder of the ROS is negative and noncontributory. Past Medical History:   Diagnosis Date    CAD (coronary artery disease)     Cancer ([facility] [address]     RENAL     CKD (chronic kidney disease) stage 3, GFR 30-59 ml/min [facility]     Diabetes ([facility] [address]     TYPE 2    Hypertension     Peripheral neuropathy     Sleep disorder     SLEEP APNEA      Past Surgical History:   Procedure Laterality Date    CARDIAC SURG PROCEDURE UNLIST      HX NEPHROSTOMY      HX ORTHOPAEDIC      2 KNEE REPLACEMENTS    IR STENT PLACEMENT        Prior to Admission medications    Medication Sig Start Date End Date Taking? Authorizing Provider   metoprolol tartrate (LOPRESSOR) 100 mg IR tablet Take 1 Tab by mouth two (2) times a day. 12/5/19   Sid Vasquez MD   apixaban (ELIQUIS) 5 mg tablet Take 1 Tab by mouth every twelve (12) hours. 12/5/19   Sid Vasquez MD   pyridoxine, vitamin B6, (VITAMIN B-6) 50 mg cap Take 1 Cap by mouth daily. Provider, Historical   cabozantinib 40 mg tab Take 40 mg by mouth daily. Provider, Historical   raNITIdine (ZANTAC) 150 mg tablet Take 150 mg by mouth two (2) times a day. Provider, Historical   cyanocobalamin (VITAMIN B12) 250 mcg tablet Take 1,000 mcg by mouth two (2) times a day. Provider, Historical   cholecalciferol (VITAMIN D3) 25 mcg (1,000 unit) cap Take 2,000 Units by mouth daily. Provider, Historical   multivitamin (ONE A DAY) tablet Take 1 Tab by mouth daily.     Provider, Historical   nitroglycerin (NITROSTAT) 0.4 mg SL tablet 0.4 mg by SubLINGual route every five (5) minutes as needed for Chest Pain. Up to 3 doses. Provider, Historical   folic acid (FOLVITE) 1 mg tablet Take 3 mg by mouth nightly. Provider, Historical   oxyCODONE-acetaminophen (PERCOCET) 5-325 mg per tablet Take 1 Tab by mouth every six (6) hours as needed for Pain. Provider, Historical   torsemide (DEMADEX) 20 mg tablet Take 60 mg by mouth two (2) times a day. Provider, Historical   finasteride (PROSCAR) 5 mg tablet Take 5 mg by mouth nightly. Provider, Historical   terazosin (HYTRIN) 10 mg capsule Take 10 mg by mouth nightly. Provider, Historical   atorvastatin (LIPITOR) 80 mg tablet Take 40 mg by mouth nightly. Provider, Historical   docusate sodium (COLACE) 100 mg capsule Take 100 mg by mouth three (3) times daily as needed. Provider, Historical   sennosides (SENNA) 8.6 mg cap Take 1 Cap by mouth nightly. Provider, Historical   febuxostat (ULORIC) 40 mg tab tablet Take 80 mg by mouth daily. Provider, Historical   potassium chloride SR (KLOR-CON 10) 10 mEq tablet Take 30 mEq by mouth two (2) times a day. Provider, Historical   insulin glargine (LANTUS,BASAGLAR) 100 unit/mL (3 mL) inpn 50 Units by SubCUTAneous route daily. Provider, Historical   insulin CONCENTRATED regular (HUMULIN R U-500) 500 unit/mL soln 200 Units by SubCUTAneous route Daily (before breakfast). Provider, Historical   insulin CONCENTRATED regular (HUMULIN R U-500) 500 unit/mL soln 50 Units by SubCUTAneous route Daily (before dinner). Provider, Historical     Allergies   Allergen Reactions    Allopurinol Rash    Gabapentin Other (comments)     nightmares    Paradione Hives      Social History     Tobacco Use    Smoking status: Never Smoker    Smokeless tobacco: Never Used   Substance Use Topics    Alcohol use: Never     Frequency: Never      No family history on file.      Objective:      Patient Vitals for the past 24 hrs:   Temp Pulse Resp BP SpO2   02/06/20 1048 98.6 °F (37 °C) 71 15 145/90 93 %   02/06/20 0948     94 %   02/06/20 0908  68  152/59    02/06/20 0900  60      02/06/20 0735 98.2 °F (36.8 °C) 73 22 132/51 93 %   02/06/20 0501 98.2 °F (36.8 °C) 60 20 144/62 94 %   02/06/20 0414  62  154/67    02/06/20 0400  (!) 59 14 145/49 94 %   02/06/20 0355     95 %   02/06/20 0039     90 %   02/06/20 0030  (!) 51 15 143/55 95 %   02/06/20 0015  (!) 54 15 133/61 95 %   02/05/20 2355     95 %   02/05/20 2348  (!) 54  133/48    02/05/20 2345  (!) 55 16 133/48 (!) 86 %   02/05/20 2330  (!) 54 14 145/69 93 %   02/05/20 2315  (!) 54 12 135/56 94 %   02/05/20 2300  (!) 53 14 127/56 99 %   02/05/20 2245  (!) 50 12 135/53 99 %   02/05/20 2111     90 %   02/05/20 2051 97.9 °F (36.6 °C) (!) 57 19 110/70 91 %     No intake/output data recorded. Physical Exam:  General:Alert, MILD distress,   Eyes:No scleral icterus, No conjunctival pallor  Neck:Supple,no mass palpable,no thyromegaly  Lungs:COARSE to auscultation Bilaterally, INCREASED respiratory effort  CVS:RRR, S1 S2 normal,  No rub,  Abdomen:Soft, Non tender, No hepatosplenomegaly  Extremities: ++ LE edema  Skin:REDNESS +  Psych: appropriate affect    :  BLANCO + HEMATURIA +  Musculoskeletal : no redness, no joint tenderness  NEURO: Normal Speech, Non focal        CODE STATUS: FULL  Care Plan discussed with:  PATIENT, DR. COLUNGA     Chart reviewed. TOTAL TIME: 76 Minutes   y Reviewed previous records   y Discussion with patient and/or family and questions answered   y >50% of visit spent in counseling and coordination of care        ECG[de-identified] Rev:yes  Xray/CT/US/MRI REV:yes. CT ABDO FROM DEC. 2019  Addendum: CORRECTION: The lungs are not clear. There are small patchy  infiltrates in the upper lobes bilaterally and a focal airspace opacity in the  right lower lobe.  There is a 1.1 x 1.2 cm left upper lobe pulmonary nodule (4,  32).     ADDITIONAL IMPRESSION: Patchy airspace infiltrate in the apices and right lower  lobe which may be infectious. 1.1 x 1.2 cm left upper lobe pulmonary nodule  suspicious for metastatic deposit in patient with suspected metastatic neoplasm.         Addended by Anjali Cool MD on 12/2/2019  9:17 AM      Study Result     EXAM: CT CHEST WO CONT, CT ABD PELV WO CONT     INDICATION: chest pain with hematuria; eval for dissection     COMPARISON: Chest x-ray of earlier today and MRI abdomen of 1/28/2014.     TECHNIQUE:  5 mm axial images were obtained through the chest, abdomen, and  pelvis. Oral and IV contrast were not administered. Coronal and sagittal  reconstructions were generated. CT dose reduction was achieved through use of a  standardized protocol tailored for this examination and automatic exposure  control for dose modulation.     FINDINGS:     THYROID: No nodule. MEDIASTINUM: No mass or lymphadenopathy. SUSIE: No mass or lymphadenopathy. THORACIC AORTA: Atherosclerotic calcification without aneurysm. MAIN PULMONARY ARTERY: Normal in caliber. TRACHEA/BRONCHI: Patent. ESOPHAGUS: No wall thickening or dilatation. HEART: The heart is normal in size without pericardial effusion. There is fatty  metaplasia of the right ventricular wall. Coronary artery calcifications are  noted. PLEURA: No effusion or pneumothorax. LUNGS: No nodule, mass, or airspace disease. LIVER: No mass or biliary dilation. GALLBLADDER: Unremarkable. SPLEEN: Unremarkable. PANCREAS: No mass or ductal dilation. ADRENALS: Unremarkable. KIDNEYS: Marked renal atrophy with tiny right kidney similar to appearance to  prior MRI. Left kidney appears unremarkable with no hydronephrosis. STOMACH: Unremarkable  SMALL BOWEL: No dilatation or wall thickening. COLON: No dilatation or wall thickening. APPENDIX: Unremarkable. PERITONEUM: No ascites or pneumoperitoneum. RETROPERITONEUM: Atherosclerotic calcifications without aneurysm.  Left pelvic  sidewall lymph node measures 1.6 x 3.4 cm (2, 114) No other enlarged  lymphadenopathy. REPRODUCTIVE ORGANS: Prostate and seminal vesicles appear unremarkable. URINARY BLADDER: No mass or calculus. BONES: The spine change. Multiple sclerotic osseous metastases within the  vertebral bodies and pelvis. ADDITIONAL COMMENTS: N/A     IMPRESSION  IMPRESSION: No acute findings on unenhanced exam which may not be sensitive for  dissection. Incidentals as above including multiple sclerotic osseous metastases  and left pelvic sidewall lymph node suspicious for possible metastases in this  patient with known cancer, possibly prostate.                Lab Data Personally Reviewed: (see below)  Recent Labs     02/06/20  0534 02/05/20  2106   WBC 7.2 8.4   HGB 9.5* 10.2*    292   ANEU 4.9 5.7   * 132*   K 5.0 5.1   * 216*   BUN 65* 63*   CREA 1.73* 1.82*   ALT 22 24   SGOT 33 37   TBILI 0.6 0.6   * 602*   CA 8.9 9.0     Lab Results   Component Value Date/Time    Color YELLOW/STRAW 02/06/2020 05:28 AM    Appearance CLEAR 02/06/2020 05:28 AM    Specific gravity 1.016 02/06/2020 05:28 AM    pH (UA) 5.5 02/06/2020 05:28 AM    Protein TRACE (A) 02/06/2020 05:28 AM    Glucose NEGATIVE  02/06/2020 05:28 AM    Ketone NEGATIVE  02/06/2020 05:28 AM    Bilirubin NEGATIVE  02/06/2020 05:28 AM    Urobilinogen 1.0 02/06/2020 05:28 AM    Nitrites NEGATIVE  02/06/2020 05:28 AM    Leukocyte Esterase NEGATIVE  02/06/2020 05:28 AM    Epithelial cells FEW 02/06/2020 05:28 AM    Bacteria NEGATIVE  02/06/2020 05:28 AM    WBC 0-4 02/06/2020 05:28 AM    RBC  02/06/2020 05:28 AM       No results found for: IRON, FE, TIBC, IBCT, PSAT, FERR  No results found for: CULT  Prior to Admission Medications   Prescriptions Last Dose Informant Patient Reported? Taking? apixaban (ELIQUIS) 5 mg tablet   No No   Sig: Take 1 Tab by mouth every twelve (12) hours. atorvastatin (LIPITOR) 80 mg tablet  Parent Yes No   Sig: Take 40 mg by mouth nightly.    cabozantinib 40 mg tab Parent Yes No   Sig: Take 40 mg by mouth daily. cholecalciferol (VITAMIN D3) 25 mcg (1,000 unit) cap  Parent Yes No   Sig: Take 2,000 Units by mouth daily. cyanocobalamin (VITAMIN B12) 250 mcg tablet  Parent Yes No   Sig: Take 1,000 mcg by mouth two (2) times a day. docusate sodium (COLACE) 100 mg capsule  Parent Yes No   Sig: Take 100 mg by mouth three (3) times daily as needed. febuxostat (ULORIC) 40 mg tab tablet  Parent Yes No   Sig: Take 80 mg by mouth daily. finasteride (PROSCAR) 5 mg tablet  Parent Yes No   Sig: Take 5 mg by mouth nightly. folic acid (FOLVITE) 1 mg tablet  Parent Yes No   Sig: Take 3 mg by mouth nightly. insulin CONCENTRATED regular (HUMULIN R U-500) 500 unit/mL soln  Parent Yes No   Si Units by SubCUTAneous route Daily (before breakfast). insulin CONCENTRATED regular (HUMULIN R U-500) 500 unit/mL soln  Parent Yes No   Si Units by SubCUTAneous route Daily (before dinner). insulin glargine (LANTUS,BASAGLAR) 100 unit/mL (3 mL) inpn  Parent Yes No   Si Units by SubCUTAneous route daily. metoprolol tartrate (LOPRESSOR) 100 mg IR tablet   No No   Sig: Take 1 Tab by mouth two (2) times a day. multivitamin (ONE A DAY) tablet  Parent Yes No   Sig: Take 1 Tab by mouth daily. nitroglycerin (NITROSTAT) 0.4 mg SL tablet  Parent Yes No   Si.4 mg by SubLINGual route every five (5) minutes as needed for Chest Pain. Up to 3 doses. oxyCODONE-acetaminophen (PERCOCET) 5-325 mg per tablet  Parent Yes No   Sig: Take 1 Tab by mouth every six (6) hours as needed for Pain.   potassium chloride SR (KLOR-CON 10) 10 mEq tablet  Parent Yes No   Sig: Take 30 mEq by mouth two (2) times a day. pyridoxine, vitamin B6, (VITAMIN B-6) 50 mg cap  Parent Yes No   Sig: Take 1 Cap by mouth daily. raNITIdine (ZANTAC) 150 mg tablet  Parent Yes No   Sig: Take 150 mg by mouth two (2) times a day. sennosides (SENNA) 8.6 mg cap  Parent Yes No   Sig: Take 1 Cap by mouth nightly. terazosin (HYTRIN) 10 mg capsule  Parent Yes No   Sig: Take 10 mg by mouth nightly. torsemide (DEMADEX) 20 mg tablet  Parent Yes No   Sig: Take 60 mg by mouth two (2) times a day. Facility-Administered Medications: None     Imaging:    Medications list Personally Reviewed   [x]      Yes     []               No    Thank you for allowing us to participate in the care this patient. We will follow patient with you. Signed By: Claudell Medina, MD  [facility]  [facility], [address]  Phone - [phone]         Fax - [phone] [facility] Office  [address] [place] [address]  Phone - [phone]        Fax - [phone]     [email or web address]

## 2020-02-06 NOTE — ED TRIAGE NOTES
Patient has received 3 breathing treatments PTA but still experiencing the SOB/respiratory distress.

## 2020-02-06 NOTE — H&P
Hospitalist Admission Note    NAME: Nat Mcdaniel   :  1945   MRN:  [id number]     Date/Time:  2020 2:39 AM    Patient PCP: Brittany Patel MD  ______________________________________________________________________  Given the patient's current clinical presentation, I have a high level of concern for decompensation if discharged from the emergency department. Complex decision making was performed, which includes reviewing the patient's available past medical records, laboratory results, and x-ray films. My assessment of this patient's clinical condition and my plan of care is as follows. 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 Will ask urology consult.  Place davis's for now    Acute on chronic respiratory failure with hypoxia, baseline 2L   Anasarca  Brenden with CKD3  Due to Acute on chronic diastolic heart failure  Admit to PCU  Continue Ventimask  PRN BiPAP  Given 1mg IV Bumex in ED without diuresis, will place on 2mg IV BID, first dose now  Monitor I/Os, daily weight and lytes  Nephrology consult  Check UA to look for proteinurea  Check Bladder scan and renal US    B/l leg redness without warmth and tenderness  Monitor leg swelling as risk for cellulitis, low threshold to start abx    Renal cell carcinoma s/p R Nephrectomy    Chronic A-fib, rate controlled  HTN  Continue Eliquis  Mild bradycardia, doesn't remember the exact dose for metoprolol and claims meds has been adjusted recently  Will continue metoprolol at 25mg PO BID  Pharmacy consult for medrec    Chronic pain   Continue Percocet    Type 2 diabetes mellitus   Will continue lantus at lower dose 30 units daily  SSI    CAD (coronary artery disease)  On Eliquis    BPH (benign prostatic hyperplasia)  Continue Hytrin and Finesteride    Hyperlipidemia   Continue statins    Gout   Continue Uloric    Code Status: Full  Surrogate Decision Maker: Wife    DVT Prophylaxis: Eliquis  GI Prophylaxis: not indicated    Baseline: functional      Subjective:   CHIEF COMPLAINT: shortness of breath    HISTORY OF PRESENT ILLNESS:     Mervat Linda is a 75 y.o.  male who presents with shortness of breath. As per patient, he is been chronically sick and has been having worsening swelling of all 4 extremities and has been recently admitted to [place] for diuresis. Pt reported today breathing got much worse and came to ED for evaluation. Pt denies any fever, chills, nausea, vomiting, diarrhea, chest pain, productive cough. In ED pt presented on BiPAP and then placed on Ventimask, pt noted to be on 2L O2 at home. In ED CXR showed CHF pattern. We were asked to admit for work up and evaluation of the above problems. Past Medical History:   Diagnosis Date    CAD (coronary artery disease)     Cancer ([facility] [address]     RENAL     Diabetes ([facility] [address]     TYPE 2    Hypertension     Peripheral neuropathy     Sleep disorder     SLEEP APNEA        Past Surgical History:   Procedure Laterality Date    CARDIAC SURG PROCEDURE UNLIST      HX NEPHROSTOMY      HX ORTHOPAEDIC      2 KNEE REPLACEMENTS    IR STENT PLACEMENT         Social History     Tobacco Use    Smoking status: Never Smoker    Smokeless tobacco: Never Used   Substance Use Topics    Alcohol use: Never     Frequency: Never        Family history: positive for DM, HTN and Heart Disease    Allergies   Allergen Reactions    Allopurinol Rash    Gabapentin Other (comments)     nightmares    Paradione Hives        Prior to Admission medications    Medication Sig Start Date End Date Taking? Authorizing Provider   metoprolol tartrate (LOPRESSOR) 100 mg IR tablet Take 1 Tab by mouth two (2) times a day. 12/5/19   Arabella Fermin MD   apixaban (ELIQUIS) 5 mg tablet Take 1 Tab by mouth every twelve (12) hours. 12/5/19   Arabella Fermin MD   pyridoxine, vitamin B6, (VITAMIN B-6) 50 mg cap Take 1 Cap by mouth daily.     Provider, Historical cabozantinib 40 mg tab Take 40 mg by mouth daily. Provider, Historical   raNITIdine (ZANTAC) 150 mg tablet Take 150 mg by mouth two (2) times a day. Provider, Historical   cyanocobalamin (VITAMIN B12) 250 mcg tablet Take 1,000 mcg by mouth two (2) times a day. Provider, Historical   cholecalciferol (VITAMIN D3) 25 mcg (1,000 unit) cap Take 2,000 Units by mouth daily. Provider, Historical   multivitamin (ONE A DAY) tablet Take 1 Tab by mouth daily. Provider, Historical   nitroglycerin (NITROSTAT) 0.4 mg SL tablet 0.4 mg by SubLINGual route every five (5) minutes as needed for Chest Pain. Up to 3 doses. Provider, Historical   folic acid (FOLVITE) 1 mg tablet Take 3 mg by mouth nightly. Provider, Historical   oxyCODONE-acetaminophen (PERCOCET) 5-325 mg per tablet Take 1 Tab by mouth every six (6) hours as needed for Pain. Provider, Historical   torsemide (DEMADEX) 20 mg tablet Take 60 mg by mouth two (2) times a day. Provider, Historical   finasteride (PROSCAR) 5 mg tablet Take 5 mg by mouth nightly. Provider, Historical   terazosin (HYTRIN) 10 mg capsule Take 10 mg by mouth nightly. Provider, Historical   atorvastatin (LIPITOR) 80 mg tablet Take 40 mg by mouth nightly. Provider, Historical   docusate sodium (COLACE) 100 mg capsule Take 100 mg by mouth three (3) times daily as needed. Provider, Historical   sennosides (SENNA) 8.6 mg cap Take 1 Cap by mouth nightly. Provider, Historical   febuxostat (ULORIC) 40 mg tab tablet Take 80 mg by mouth daily. Provider, Historical   potassium chloride SR (KLOR-CON 10) 10 mEq tablet Take 30 mEq by mouth two (2) times a day. Provider, Historical   insulin glargine (LANTUS,BASAGLAR) 100 unit/mL (3 mL) inpn 50 Units by SubCUTAneous route daily. Provider, Historical   insulin CONCENTRATED regular (HUMULIN R U-500) 500 unit/mL soln 200 Units by SubCUTAneous route Daily (before breakfast).     Provider, Historical   insulin CONCENTRATED regular (HUMULIN R U-500) 500 unit/mL soln 50 Units by SubCUTAneous route Daily (before dinner). Provider, Historical       REVIEW OF SYSTEMS:     I am not able to complete the review of systems because: The patient is intubated and sedated    The patient has altered mental status due to his acute medical problems    The patient has baseline aphasia from prior stroke(s)    The patient has baseline dementia and is not reliable historian    The patient is in acute medical distress and unable to provide information           Total of 12 systems reviewed as follows:       POSITIVE= underlined text  Negative = text not underlined  General:  fever, chills, sweats, generalized weakness, weight gain,      loss of appetite   Eyes:    blurred vision, eye pain, loss of vision, double vision  ENT:    rhinorrhea, pharyngitis   Respiratory:   cough, sputum production, SOB, LOMBARDI, wheezing, pleuritic pain   Cardiology:   chest pain, palpitations, orthopnea, PND, edema, syncope   Gastrointestinal:  abdominal pain , N/V, diarrhea, dysphagia, constipation, bleeding   Genitourinary:  frequency, urgency, dysuria, hematuria, incontinence   Muskuloskeletal :  arthralgia, myalgia, back pain  Hematology:  easy bruising, nose or gum bleeding, lymphadenopathy   Dermatological: rash, ulceration, pruritis, color change / jaundice  Endocrine:   hot flashes or polydipsia   Neurological:  headache, dizziness, confusion, focal weakness, paresthesia,     Speech difficulties, memory loss, gait difficulty  Psychological: Feelings of anxiety, depression, agitation    Objective:   VITALS:    Visit Vitals  /55   Pulse (!) 51   Temp 97.9 °F (36.6 °C)   Resp 15   Ht 5' 11\" (1.803 m)   Wt (!) 161 kg (355 lb)   SpO2 90%   BMI 49.51 kg/m²       PHYSICAL EXAM:    General:    Alert, cooperative, no distress, appears stated age.      HEENT: Atraumatic, anicteric sclerae, pink conjunctivae     No oral ulcers, mucosa moist, throat clear, dentition fair  Neck:  Supple, symmetrical,  thyroid: non tender  Lungs:   Crackles. No Wheezing or Rhonchi. No rales. Chest wall:  No tenderness  No Accessory muscle use. Heart:   Regular  rhythm,  No  murmur   +++ edema  Abdomen:   Soft, non-tender. Not distended. Bowel sounds normal  Extremities: No cyanosis. No clubbing,      Skin turgor normal, Capillary refill normal, Radial dial pulse 2+  Skin:     Not pale. Not Jaundiced  No rashes   Psych:  Good insight. Not depressed. Not anxious or agitated. Neurologic: EOMs intact. No facial asymmetry. No aphasia or slurred speech. Symmetrical strength, Sensation grossly intact. Alert and oriented X 4.     _______________________________________________________________________  Care Plan discussed with:    Comments   Patient y    Family      RN y    Care Manager                    Consultant:  isabel ED physician   _______________________________________________________________________  Expected  Disposition:   Home with Family    HH/PT/OT/RN    SNF/LTC y   [de-identified]    ________________________________________________________________________  TOTAL TIME: 61 Minutes    Critical Care Provided   61  Minutes non procedure based      Comments    y Reviewed previous records   >50% of visit spent in counseling and coordination of care y Discussion with patient and questions answered       ________________________________________________________________________  Signed: Princess Bailon MD    Procedures: see electronic medical records for all procedures/Xrays and details which were not copied into this note but were reviewed prior to creation of Plan.     LAB DATA REVIEWED:    Recent Results (from the past 24 hour(s))   EKG, 12 LEAD, INITIAL    Collection Time: 02/05/20  9:02 PM   Result Value Ref Range    Ventricular Rate 55 BPM    Atrial Rate 30 BPM    QRS Duration 96 ms    Q-T Interval 430 ms    QTC Calculation (Bezet) 411 ms    Calculated R Axis -23 degrees    Calculated T Axis 54 degrees    Diagnosis       Atrial fibrillation with slow ventricular response  Low voltage QRS  When compared with ECG of 02-DEC-2019 00:14,  Nonspecific T wave abnormality no longer evident in Inferior leads  Nonspecific T wave abnormality, improved in Anterolateral leads     CBC WITH AUTOMATED DIFF    Collection Time: 02/05/20  9:06 PM   Result Value Ref Range    WBC 8.4 4.1 - 11.1 K/uL    RBC 3.53 (L) 4.10 - 5.70 M/uL    HGB 10.2 (L) 12.1 - 17.0 g/dL    HCT 33.6 (L) 36.6 - 50.3 %    MCV 95.2 80.0 - 99.0 FL    MCH 28.9 26.0 - 34.0 PG    MCHC 30.4 30.0 - 36.5 g/dL    RDW 18.9 (H) 11.5 - 14.5 %    PLATELET 646 049 - 001 K/uL    MPV 9.0 8.9 - 12.9 FL    NRBC 1.0 (H) 0  WBC    ABSOLUTE NRBC 0.08 (H) 0.00 - 0.01 K/uL    NEUTROPHILS 68 32 - 75 %    LYMPHOCYTES 16 12 - 49 %    MONOCYTES 10 5 - 13 %    EOSINOPHILS 4 0 - 7 %    BASOPHILS 1 0 - 1 %    IMMATURE GRANULOCYTES 1 (H) 0.0 - 0.5 %    ABS. NEUTROPHILS 5.7 1.8 - 8.0 K/UL    ABS. LYMPHOCYTES 1.3 0.8 - 3.5 K/UL    ABS. MONOCYTES 0.8 0.0 - 1.0 K/UL    ABS. EOSINOPHILS 0.3 0.0 - 0.4 K/UL    ABS. BASOPHILS 0.1 0.0 - 0.1 K/UL    ABS. IMM. GRANS. 0.1 (H) 0.00 - 0.04 K/UL    DF AUTOMATED     METABOLIC PANEL, COMPREHENSIVE    Collection Time: 02/05/20  9:06 PM   Result Value Ref Range    Sodium 132 (L) 136 - 145 mmol/L    Potassium 5.1 3.5 - 5.1 mmol/L    Chloride 95 (L) 97 - 108 mmol/L    CO2 31 21 - 32 mmol/L    Anion gap 6 5 - 15 mmol/L    Glucose 216 (H) 65 - 100 mg/dL    BUN 63 (H) 6 - 20 MG/DL    Creatinine 1.82 (H) 0.70 - 1.30 MG/DL    BUN/Creatinine ratio 35 (H) 12 - 20      GFR est AA 44 (L) >60 ml/min/1.73m2    GFR est non-AA 37 (L) >60 ml/min/1.73m2    Calcium 9.0 8.5 - 10.1 MG/DL    Bilirubin, total 0.6 0.2 - 1.0 MG/DL    ALT (SGPT) 24 12 - 78 U/L    AST (SGOT) 37 15 - 37 U/L    Alk.  phosphatase 602 (H) 45 - 117 U/L    Protein, total 6.6 6.4 - 8.2 g/dL    Albumin 3.0 (L) 3.5 - 5.0 g/dL    Globulin 3.6 2.0 - 4.0 g/dL    A-G Ratio 0.8 (L) 1.1 - 2.2     CK W/ REFLX CKMB    Collection Time: 02/05/20  9:06 PM   Result Value Ref Range    CK 96 39 - 308 U/L   TROPONIN I    Collection Time: 02/05/20  9:06 PM   Result Value Ref Range    Troponin-I, Qt. <0.05 <0.05 ng/mL   LACTIC ACID    Collection Time: 02/05/20  9:06 PM   Result Value Ref Range    Lactic acid 1.8 0.4 - 2.0 MMOL/L   POC TROPONIN-I    Collection Time: 02/05/20  9:06 PM   Result Value Ref Range    Troponin-I (POC) <0.04 0.00 - 0.08 ng/mL   NT-PRO BNP    Collection Time: 02/05/20  9:06 PM   Result Value Ref Range    NT pro-BNP 3,089 (H) <450 PG/ML

## 2020-02-06 NOTE — PROGRESS NOTES
Bedside and Verbal shift change report given to Shimon Archer, RN (oncoming nurse) by Galen Jimenez RN (offgoing nurse). Report included the following information SBAR, Kardex, MAR, Recent Results and Cardiac Rhythm NSR.       0800:  Called and spoke with Dr. Chelsea Huang. Pt satting 94% on venti mask at 50%. Pt sounds very tight and wheezing in upper airways. Pt pulled off venti mask and dropped to 84%. MD will place orders and round on pt. Also informed her that pt has gross hematuria post davis insertion, per night nurse it was not a traumatic insertion. Pt already has orders for Urology and Nephrology consult. 0915:  Transport asked about pt going down to US>  Asked for it to be done bedside. 0945:  Urology at bedside. 1030:  Dr Bi Womack rounded on pt. Received verbal order to irrigate davis PRN, if not flushing or flowing to change davis out to large Turkish. 1300: Wound care at bedside. L heel unstagable, R OK, no sore to buttock/sacrum. Wound care will order bed.    1315:  Flushed davis and large clot at connection. Will continue to monitor. 1350:  Dr. Laura Renteria saw pt and spoke with Dr. Chelsea Huang. 1415:  Dr. Chelsea Huang at bedside to see pt.      1430:  Candis Ortiz with palliative care at bedside. H&H drawn and sent to lab    1530:  Sent message to Dr. Naomi Varela MD does want to to receive 3 doses of bumex. 1600:  Flushed davis again and clot came out. Meet some resistance. Pt tolerated. Bladder scan showed 44ml. Sent message to Urology. 1630:  Spoke with Dr. Bi Womack. Received order to increase davis size to a 22 or 24 Turkish. Pt has had clots when irrigating. 1635:  Pt down to CT    1730:  Pt back from CT    1810: Davis changed. Pt tolerated. 50 ml out initially. Scrotum propped. Pt pulled over to new bed. Repositioned.

## 2020-02-07 LAB
ALBUMIN SERPL-MCNC: 2.6 G/DL (ref 3.5–5)
ALBUMIN/GLOB SERPL: 0.8 {RATIO} (ref 1.1–2.2)
ALP SERPL-CCNC: 505 U/L (ref 45–117)
ALT SERPL-CCNC: 20 U/L (ref 12–78)
ANION GAP SERPL CALC-SCNC: 7 MMOL/L (ref 5–15)
AST SERPL-CCNC: 26 U/L (ref 15–37)
BASOPHILS # BLD: 0.1 K/UL (ref 0–0.1)
BASOPHILS NFR BLD: 1 % (ref 0–1)
BILIRUB SERPL-MCNC: 0.6 MG/DL (ref 0.2–1)
BUN SERPL-MCNC: 65 MG/DL (ref 6–20)
BUN/CREAT SERPL: 36 (ref 12–20)
CALCIUM SERPL-MCNC: 8.5 MG/DL (ref 8.5–10.1)
CALCIUM SERPL-MCNC: 8.6 MG/DL (ref 8.5–10.1)
CHLORIDE SERPL-SCNC: 96 MMOL/L (ref 97–108)
CO2 SERPL-SCNC: 30 MMOL/L (ref 21–32)
CORTIS AM PEAK SERPL-MCNC: 14.1 UG/DL (ref 4.3–22.45)
CREAT SERPL-MCNC: 1.82 MG/DL (ref 0.7–1.3)
DIFFERENTIAL METHOD BLD: ABNORMAL
EOSINOPHIL # BLD: 0.1 K/UL (ref 0–0.4)
EOSINOPHIL NFR BLD: 2 % (ref 0–7)
ERYTHROCYTE [DISTWIDTH] IN BLOOD BY AUTOMATED COUNT: 18.8 % (ref 11.5–14.5)
GLOBULIN SER CALC-MCNC: 3.1 G/DL (ref 2–4)
GLUCOSE BLD STRIP.AUTO-MCNC: 239 MG/DL (ref 65–100)
GLUCOSE BLD STRIP.AUTO-MCNC: 255 MG/DL (ref 65–100)
GLUCOSE BLD STRIP.AUTO-MCNC: 299 MG/DL (ref 65–100)
GLUCOSE BLD STRIP.AUTO-MCNC: 304 MG/DL (ref 65–100)
GLUCOSE SERPL-MCNC: 199 MG/DL (ref 65–100)
HAV IGM SER QL: NONREACTIVE
HBV CORE IGM SER QL: NONREACTIVE
HBV SURFACE AG SER QL: <0.1 INDEX
HBV SURFACE AG SER QL: NEGATIVE
HCT VFR BLD AUTO: 27.8 % (ref 36.6–50.3)
HCT VFR BLD AUTO: 27.8 % (ref 36.6–50.3)
HCV AB SERPL QL IA: NONREACTIVE
HCV COMMENT,HCGAC: NORMAL
HGB BLD-MCNC: 8.4 G/DL (ref 12.1–17)
HGB BLD-MCNC: 8.6 G/DL (ref 12.1–17)
IMM GRANULOCYTES # BLD AUTO: 0.1 K/UL (ref 0–0.04)
IMM GRANULOCYTES NFR BLD AUTO: 1 % (ref 0–0.5)
LYMPHOCYTES # BLD: 0.9 K/UL (ref 0.8–3.5)
LYMPHOCYTES NFR BLD: 12 % (ref 12–49)
MCH RBC QN AUTO: 28.9 PG (ref 26–34)
MCHC RBC AUTO-ENTMCNC: 30.2 G/DL (ref 30–36.5)
MCV RBC AUTO: 95.5 FL (ref 80–99)
MONOCYTES # BLD: 0.7 K/UL (ref 0–1)
MONOCYTES NFR BLD: 10 % (ref 5–13)
NEUTS SEG # BLD: 5.5 K/UL (ref 1.8–8)
NEUTS SEG NFR BLD: 74 % (ref 32–75)
NRBC # BLD: 0.06 K/UL (ref 0–0.01)
NRBC BLD-RTO: 0.8 PER 100 WBC
PLATELET # BLD AUTO: 230 K/UL (ref 150–400)
PMV BLD AUTO: 9.1 FL (ref 8.9–12.9)
POTASSIUM SERPL-SCNC: 4.8 MMOL/L (ref 3.5–5.1)
PROT SERPL-MCNC: 5.7 G/DL (ref 6.4–8.2)
PTH-INTACT SERPL-MCNC: 123.7 PG/ML (ref 18.4–88)
RBC # BLD AUTO: 2.91 M/UL (ref 4.1–5.7)
SERVICE CMNT-IMP: ABNORMAL
SODIUM SERPL-SCNC: 133 MMOL/L (ref 136–145)
SP1: NORMAL
SP2: NORMAL
SP3: NORMAL
TSH SERPL DL<=0.05 MIU/L-ACNC: 5.78 UIU/ML (ref 0.36–3.74)
WBC # BLD AUTO: 7.3 K/UL (ref 4.1–11.1)

## 2020-02-07 PROCEDURE — 94640 AIRWAY INHALATION TREATMENT: CPT

## 2020-02-07 PROCEDURE — 74011000250 HC RX REV CODE- 250: Performed by: INTERNAL MEDICINE

## 2020-02-07 PROCEDURE — 77030018836 HC SOL IRR NACL ICUM -A

## 2020-02-07 PROCEDURE — 74011636637 HC RX REV CODE- 636/637: Performed by: INTERNAL MEDICINE

## 2020-02-07 PROCEDURE — 85018 HEMOGLOBIN: CPT

## 2020-02-07 PROCEDURE — 85025 COMPLETE CBC W/AUTO DIFF WBC: CPT

## 2020-02-07 PROCEDURE — 74011250636 HC RX REV CODE- 250/636: Performed by: INTERNAL MEDICINE

## 2020-02-07 PROCEDURE — 74011250637 HC RX REV CODE- 250/637: Performed by: INTERNAL MEDICINE

## 2020-02-07 PROCEDURE — 80074 ACUTE HEPATITIS PANEL: CPT

## 2020-02-07 PROCEDURE — 86160 COMPLEMENT ANTIGEN: CPT

## 2020-02-07 PROCEDURE — 80053 COMPREHEN METABOLIC PANEL: CPT

## 2020-02-07 PROCEDURE — 84443 ASSAY THYROID STIM HORMONE: CPT

## 2020-02-07 PROCEDURE — 82784 ASSAY IGA/IGD/IGG/IGM EACH: CPT

## 2020-02-07 PROCEDURE — 86038 ANTINUCLEAR ANTIBODIES: CPT

## 2020-02-07 PROCEDURE — 36415 COLL VENOUS BLD VENIPUNCTURE: CPT

## 2020-02-07 PROCEDURE — 77010033678 HC OXYGEN DAILY

## 2020-02-07 PROCEDURE — 82962 GLUCOSE BLOOD TEST: CPT

## 2020-02-07 PROCEDURE — 65660000000 HC RM CCU STEPDOWN

## 2020-02-07 PROCEDURE — 83970 ASSAY OF PARATHORMONE: CPT

## 2020-02-07 PROCEDURE — 82533 TOTAL CORTISOL: CPT

## 2020-02-07 RX ORDER — POTASSIUM CHLORIDE 750 MG/1
30 TABLET, FILM COATED, EXTENDED RELEASE ORAL 2 TIMES DAILY
Status: DISCONTINUED | OUTPATIENT
Start: 2020-02-07 | End: 2020-02-09

## 2020-02-07 RX ORDER — FOLIC ACID 1 MG/1
2 TABLET ORAL
Status: DISCONTINUED | OUTPATIENT
Start: 2020-02-08 | End: 2020-02-11

## 2020-02-07 RX ORDER — KETOCONAZOLE 20 MG/ML
SHAMPOO TOPICAL DAILY PRN
COMMUNITY
End: 2020-02-18

## 2020-02-07 RX ORDER — INSULIN GLARGINE 100 [IU]/ML
40 INJECTION, SOLUTION SUBCUTANEOUS DAILY
Status: DISCONTINUED | OUTPATIENT
Start: 2020-02-07 | End: 2020-02-12

## 2020-02-07 RX ORDER — BUMETANIDE 0.25 MG/ML
3 INJECTION INTRAMUSCULAR; INTRAVENOUS EVERY 8 HOURS
Status: DISCONTINUED | OUTPATIENT
Start: 2020-02-07 | End: 2020-02-11

## 2020-02-07 RX ORDER — METOPROLOL TARTRATE 25 MG/1
12.5 TABLET, FILM COATED ORAL 2 TIMES DAILY
Status: DISCONTINUED | OUTPATIENT
Start: 2020-02-07 | End: 2020-02-07

## 2020-02-07 RX ORDER — ISOSORBIDE MONONITRATE 30 MG/1
30 TABLET, EXTENDED RELEASE ORAL DAILY
Status: DISCONTINUED | OUTPATIENT
Start: 2020-02-08 | End: 2020-02-07

## 2020-02-07 RX ORDER — MELATONIN
2000 DAILY
Status: DISCONTINUED | OUTPATIENT
Start: 2020-02-08 | End: 2020-02-11

## 2020-02-07 RX ORDER — IPRATROPIUM BROMIDE AND ALBUTEROL SULFATE 2.5; .5 MG/3ML; MG/3ML
3 SOLUTION RESPIRATORY (INHALATION)
COMMUNITY

## 2020-02-07 RX ORDER — LANOLIN ALCOHOL/MO/W.PET/CERES
100 CREAM (GRAM) TOPICAL DAILY
Status: DISCONTINUED | OUTPATIENT
Start: 2020-02-08 | End: 2020-02-11

## 2020-02-07 RX ORDER — ISOSORBIDE MONONITRATE 30 MG/1
30 TABLET, EXTENDED RELEASE ORAL DAILY
COMMUNITY
End: 2020-02-18

## 2020-02-07 RX ORDER — HYDRALAZINE HYDROCHLORIDE 20 MG/ML
20 INJECTION INTRAMUSCULAR; INTRAVENOUS
Status: DISCONTINUED | OUTPATIENT
Start: 2020-02-07 | End: 2020-02-18 | Stop reason: HOSPADM

## 2020-02-07 RX ORDER — PANTOPRAZOLE SODIUM 40 MG/1
40 TABLET, DELAYED RELEASE ORAL DAILY
Status: DISCONTINUED | OUTPATIENT
Start: 2020-02-08 | End: 2020-02-18 | Stop reason: HOSPADM

## 2020-02-07 RX ORDER — METOPROLOL TARTRATE 25 MG/1
6.25 TABLET, FILM COATED ORAL 2 TIMES DAILY
Status: DISCONTINUED | OUTPATIENT
Start: 2020-02-08 | End: 2020-02-18 | Stop reason: HOSPADM

## 2020-02-07 RX ORDER — INSULIN LISPRO 100 [IU]/ML
5 INJECTION, SOLUTION INTRAVENOUS; SUBCUTANEOUS
Status: DISCONTINUED | OUTPATIENT
Start: 2020-02-07 | End: 2020-02-12

## 2020-02-07 RX ORDER — HYDRALAZINE HYDROCHLORIDE 20 MG/ML
20 INJECTION INTRAMUSCULAR; INTRAVENOUS ONCE
Status: DISCONTINUED | OUTPATIENT
Start: 2020-02-07 | End: 2020-02-07

## 2020-02-07 RX ORDER — LANOLIN ALCOHOL/MO/W.PET/CERES
1000 CREAM (GRAM) TOPICAL 2 TIMES DAILY
Status: DISCONTINUED | OUTPATIENT
Start: 2020-02-07 | End: 2020-02-11

## 2020-02-07 RX ADMIN — IPRATROPIUM BROMIDE AND ALBUTEROL SULFATE 3 ML: .5; 3 SOLUTION RESPIRATORY (INHALATION) at 01:47

## 2020-02-07 RX ADMIN — FINASTERIDE 5 MG: 5 TABLET, FILM COATED ORAL at 21:56

## 2020-02-07 RX ADMIN — POLYMYXIN B SULFATE, BACITRACIN ZINC, NEOMYCIN SULFATE: 5000; 3.5; 4 OINTMENT TOPICAL at 09:00

## 2020-02-07 RX ADMIN — OXYCODONE AND ACETAMINOPHEN 1 TABLET: 5; 325 TABLET ORAL at 09:58

## 2020-02-07 RX ADMIN — POTASSIUM CHLORIDE 30 MEQ: 750 TABLET, FILM COATED, EXTENDED RELEASE ORAL at 17:46

## 2020-02-07 RX ADMIN — Medication 5 ML: at 16:26

## 2020-02-07 RX ADMIN — IPRATROPIUM BROMIDE AND ALBUTEROL SULFATE 3 ML: .5; 3 SOLUTION RESPIRATORY (INHALATION) at 08:00

## 2020-02-07 RX ADMIN — FAMOTIDINE 20 MG: 20 TABLET, FILM COATED ORAL at 09:20

## 2020-02-07 RX ADMIN — BUMETANIDE 3 MG: 0.25 INJECTION INTRAMUSCULAR; INTRAVENOUS at 21:20

## 2020-02-07 RX ADMIN — CYANOCOBALAMIN TAB 500 MCG 1000 MCG: 500 TAB at 17:47

## 2020-02-07 RX ADMIN — Medication 10 ML: at 21:25

## 2020-02-07 RX ADMIN — METOPROLOL TARTRATE 25 MG: 25 TABLET ORAL at 09:20

## 2020-02-07 RX ADMIN — POLYMYXIN B SULFATE, BACITRACIN ZINC, NEOMYCIN SULFATE: 5000; 3.5; 4 OINTMENT TOPICAL at 17:47

## 2020-02-07 RX ADMIN — FEBUXOSTAT 80 MG: 40 TABLET ORAL at 09:28

## 2020-02-07 RX ADMIN — TERAZOSIN HYDROCHLORIDE 10 MG: 5 CAPSULE ORAL at 21:56

## 2020-02-07 RX ADMIN — ACETAMINOPHEN 650 MG: 325 TABLET ORAL at 19:34

## 2020-02-07 RX ADMIN — INSULIN LISPRO 3 UNITS: 100 INJECTION, SOLUTION INTRAVENOUS; SUBCUTANEOUS at 21:26

## 2020-02-07 RX ADMIN — ATORVASTATIN CALCIUM 40 MG: 40 TABLET, FILM COATED ORAL at 21:56

## 2020-02-07 RX ADMIN — INSULIN LISPRO 5 UNITS: 100 INJECTION, SOLUTION INTRAVENOUS; SUBCUTANEOUS at 13:35

## 2020-02-07 RX ADMIN — CHLOROTHIAZIDE SODIUM 500 MG: 500 INJECTION, POWDER, LYOPHILIZED, FOR SOLUTION INTRAVENOUS at 14:19

## 2020-02-07 RX ADMIN — INSULIN LISPRO 3 UNITS: 100 INJECTION, SOLUTION INTRAVENOUS; SUBCUTANEOUS at 09:21

## 2020-02-07 RX ADMIN — INSULIN LISPRO 5 UNITS: 100 INJECTION, SOLUTION INTRAVENOUS; SUBCUTANEOUS at 17:42

## 2020-02-07 RX ADMIN — BUMETANIDE 3 MG: 0.25 INJECTION INTRAMUSCULAR; INTRAVENOUS at 14:20

## 2020-02-07 RX ADMIN — IPRATROPIUM BROMIDE AND ALBUTEROL SULFATE 3 ML: .5; 3 SOLUTION RESPIRATORY (INHALATION) at 13:27

## 2020-02-07 RX ADMIN — INSULIN LISPRO 7 UNITS: 100 INJECTION, SOLUTION INTRAVENOUS; SUBCUTANEOUS at 17:45

## 2020-02-07 RX ADMIN — INSULIN GLARGINE 40 UNITS: 100 INJECTION, SOLUTION SUBCUTANEOUS at 09:21

## 2020-02-07 RX ADMIN — IPRATROPIUM BROMIDE AND ALBUTEROL SULFATE 3 ML: .5; 3 SOLUTION RESPIRATORY (INHALATION) at 19:02

## 2020-02-07 RX ADMIN — BUMETANIDE 2 MG: 0.25 INJECTION INTRAMUSCULAR; INTRAVENOUS at 09:20

## 2020-02-07 RX ADMIN — OXYCODONE AND ACETAMINOPHEN 1 TABLET: 5; 325 TABLET ORAL at 17:37

## 2020-02-07 NOTE — PROGRESS NOTES
ADULT PROTOCOL: JET AEROSOL ASSESSMENT    Patient  [address]     75 y.o.   male     2/6/2020  10:42 PM    Breath Sounds Pre Procedure: Right Breath Sounds: Diminished                               Left Breath Sounds: Diminished    Breath Sounds Post Procedure: Right Breath Sounds: Expiratory wheezing                                 Left Breath Sounds: Expiratory wheezing    Breathing pattern: Pre procedure Breathing Pattern: Regular          Post procedure Breathing Pattern: Tachypneic    Heart Rate: Pre procedure Pulse: 75           Post procedure Pulse: 76    Resp Rate: Pre procedure Respirations: 20           Post procedure Respirations: 24    Cough: Pre procedure Cough: Non-productive               Post procedure Cough: Non-productive  Oxygen: O2 Device: Nasal cannula       SpO2: Pre procedure SpO2: 95 %                 Post procedure SpO2: 95 %     Nebulizer Therapy: Current medications Aerosolized Medications: DuoNeb      Changed:No    Problem List:   Patient Active Problem List   Diagnosis Code    Renal cell carcinoma (HCC) C64.9    Paroxysmal A-fib (HCC) I48.0    Hematuria R31.9    Acute on chronic respiratory failure with hypoxia (HCC) J96.21    Anasarca R60.1    Acute on chronic diastolic heart failure (HCC) I50.33    Chronic pain G89.29    HTN (hypertension) I10    Type 2 diabetes mellitus (HCC) E11.9    CAD (coronary artery disease) I25.10    BPH (benign prostatic hyperplasia) N40.0    Hyperlipidemia E78.5    Gout M10.9       Respiratory Therapist: RT Elizabeth

## 2020-02-07 NOTE — PROGRESS NOTES
PIA PLAN-- Transfer to [employer] with liason at the [place] and she faxed the [place] transfer forms and they are completed. MD signed. Informed patient and he wants his wife to sign. Per liason at the [place] states she is able to sign and will need two witnesses. Wife is not in the room at this time. Called her at home and she will be up tomorrow (Saturday) to sign it and nursing to get in touch with the  to fax the forms and medicals to the [place] (Fax number--[phone]) [facility] forms left on patient's chart for wife to sign when she comes to see the patient. Judit Flaherty  RN BSN CRM        [phone]

## 2020-02-07 NOTE — PROGRESS NOTES
Hospitalist Progress Note    NAME: Quiana Molina   :  1945   MRN:  [id number]       Assessment / Plan:  Acute on chronic respiratory failure with hypoxia, baseline 2L   Anasarca  Brenden with CKD3  Due to Acute on chronic diastolic heart failure  Now on NC 6liter   PRN BiPAP  Given 1mg IV Bumex in ED without diuresis, s/p 2mg IV BID,increased to bumex 3mg q8h per nephro . Also ordered  Diuril 500mg IV once   Receive a dose of metalozone 5mg PO on   Monitor I/Os, daily weight and lytes  Appreciate Nephrology input and management   UA negative for proteinuria   Renal US showed :  1. Left kidney is within normal limits. 2. Patient is status post right nephrectomy. In the right nephrectomy bed, there  is a 4.5 cm nonspecific hypoechoic mass.   3. Urinary bladder is unremarkable.      Hematuria   Penile lesion   uro consulted , persistent hematuria ,had intermittent bladder irrigation and davis changed on   Hb stable , monitor H&H q8h as pt on eliquis   Hold eliquis for now , to be restarted on  per urology     B/l leg redness without warmth and tenderness  Monitor leg swelling as risk for cellulitis, low threshold to start abx     Renal cell carcinoma s/p R Nephrectomy     Chronic A-fib, rate controlled  HTN  Hold eliquis due to hematuria until urology evaluation   Mild bradycardia, doesn't remember the exact dose for metoprolol and claims meds has been adjusted recently  Decrease metoprolol dose to 12.5mbid with holding parameters      Chronic pain   Continue Percocet     Type 2 diabetes mellitus   BS elevated , increase lantus to 40units   Add lipsro 5units TIDAC   SSI     CAD (coronary artery disease)     BPH (benign prostatic hyperplasia)  Continue Hytrin and Finesteride     Hyperlipidemia   Continue statins     Gout   Continue Uloric    Morbid obesity      Code Status: Full  Surrogate Decision Maker: Wife     DVT Prophylaxis: Eliquis  GI Prophylaxis: not indicated     Baseline: functional      40 or above Morbid obesity / Body mass index is 50.43 kg/m². Subjective:     Chief Complaint / Reason for Physician Visit  FU CHF . Still with anasarca and SOB , requiring 6liter of oxygen    Discussed with RN events overnight. Review of Systems:  Symptom Y/N Comments  Symptom Y/N Comments   Fever/Chills    Chest Pain n    Poor Appetite    Edema y    Cough    Abdominal Pain n    Sputum    Joint Pain     SOB/LOMBARDI y   Pruritis/Rash     Nausea/vomit n   Tolerating PT/OT     Diarrhea    Tolerating Diet y    Constipation    Other       Could NOT obtain due to:      Objective:     VITALS:   Last 24hrs VS reviewed since prior progress note. Most recent are:  Patient Vitals for the past 24 hrs:   Temp Pulse Resp BP SpO2   02/07/20 0258 97.4 °F (36.3 °C) 74 22 132/59 92 %   02/07/20 0148     94 %   02/06/20 2224 97.8 °F (36.6 °C) 67 20 145/46 94 %   02/06/20 1930     95 %   02/06/20 1929 97.2 °F (36.2 °C) 82 18 124/45 93 %   02/06/20 1800  60      02/06/20 1538 98.4 °F (36.9 °C) 70 16 (!) 142/93 95 %   02/06/20 1537  68 17  95 %   02/06/20 1530     96 %   02/06/20 1048 98.6 °F (37 °C) 71 15 145/90 93 %   02/06/20 0948     94 %   02/06/20 0908  68  152/59    02/06/20 0900  60          Intake/Output Summary (Last 24 hours) at 2/7/2020 0739  Last data filed at 2/7/2020 0600  Gross per 24 hour   Intake 90 ml   Output 900 ml   Net -810 ml        PHYSICAL EXAM:  General: WD, WN. Alert, cooperative, no acute distress    EENT:  EOMI. Anicteric sclerae. MMM  Resp:  Diminished breath sounds b/l  , no wheezing or rales. No accessory muscle use  CV:  Regular  rhythm, LE  Edema 3+ , anasarca   GI:  Soft, Non distended, Non tender.  +Bowel sounds  Neurologic:  Alert and oriented X 3, normal speech,   Psych:   Good insight. Not anxious nor agitated  Skin:  No rashes.   No jaundice, petichae on b/l legs   Penile lesion   : swollen testicles and hematuria in davis bag     Reviewed most current lab test results and cultures  YES  Reviewed most current radiology test results   YES  Review and summation of old records today    NO  Reviewed patient's current orders and MAR    YES  PMH/SH reviewed - no change compared to H&P  ________________________________________________________________________  Care Plan discussed with:    Comments   Patient x    Family      RN x    Care Manager     Consultant  x nephro                    x Multidiciplinary team rounds were held today with , nursing, pharmacist and clinical coordinator. Patient's plan of care was discussed; medications were reviewed and discharge planning was addressed. ________________________________________________________________________  Total NON critical care TIME:  35  Minutes    Total CRITICAL CARE TIME Spent:   Minutes non procedure based      Comments   >50% of visit spent in counseling and coordination of care x    ________________________________________________________________________  Noble Kelly MD     Procedures: see electronic medical records for all procedures/Xrays and details which were not copied into this note but were reviewed prior to creation of Plan. LABS:  I reviewed today's most current labs and imaging studies.   Pertinent labs include:  Recent Labs     02/07/20 0017 02/06/20  1437 02/06/20  0534 02/05/20  2106   WBC 7.3  --  7.2 8.4   HGB 8.4* 9.4* 9.5* 10.2*   HCT 27.8* 31.5* 31.9* 33.6*     --  243 292     Recent Labs     02/07/20  0017 02/06/20  0534 02/05/20 2106   * 132* 132*   K 4.8 5.0 5.1   CL 96* 95* 95*   CO2 30 30 31   * 217* 216*   BUN 65* 65* 63*   CREA 1.82* 1.73* 1.82*   CA 8.5  8.6 8.9 9.0   ALB 2.6* 2.8* 3.0*   TBILI 0.6 0.6 0.6   SGOT 26 33 37   ALT 20 22 24       Signed: Noble Kelly MD

## 2020-02-07 NOTE — ACP (ADVANCE CARE PLANNING)
Primary Decision Chuy \[facility] [place] [facility]\"" - [phone]  Advance Care Planning 2/7/2020   Patient's Healthcare Decision Maker is: Legal Next of Kin   Confirm Advance Directive None   Patient Would Like to Complete Advance Directive No       Palliative LCSW met with patient and his wife Dawson Lim, her daughter Marly Elliott to discuss his wishes regarding his medical condition and goals of care. Patient seemed a bit uncomfortable, noted that he was just given a dose of his pain medication. He is a rather large man, has difficulty getting comfortable in his bed. He is bed bound at this time. Patient's wife Dawson Lim is his LNOK and decision maker and mPOA. Patient does not have an AMD. His wishes are clear and he has voiced these to his family, that he wants to be kept alive as long as possible. The impetus for his wish is that his wife will continue to receive financial benefits if he is alive (his social security income). This is at the forefront of his mind. Brenna is very realistic and accepting. She knows that patient will not have quality of life if he is sustained on \"machines\", she does not feel he has quality of life now, but as she noted, \"what can I do\"? She noted as did her daughter,Cristal,  present today that if patient's decisions have to be made for him, then [facility] will make the decision to keep him comfortable and not prolong his life. At this time patient wishes to remain Full Code.

## 2020-02-07 NOTE — PROGRESS NOTES
Spiritual Care Assessment/Progress Note  [facility]      NAME: Bee Fong      MRN: [id number]  AGE: 75 y.o.  SEX: male  Alevism Affiliation: Other   Language: English     2/7/2020     Total Time (in minutes): 10     Spiritual Assessment begun in [facility] 2 PROGRESSIVE CARE through conversation with:         [x]Patient        [] Family    [] Friend(s)        Reason for Consult: Palliative Care, Initial/Spiritual Assessment     Spiritual beliefs: (Please include comment if needed)     [] Identifies with a jo tradition:         [] Supported by a jo community:            [] Claims no spiritual orientation:           [] Seeking spiritual identity:                [] Adheres to an individual form of spirituality:           [x] Not able to assess:  See comments below                       Identified resources for coping:      [] Prayer                               [] Music                  [] Guided Imagery     [] Family/friends                 [] Pet visits     [] Devotional reading                         [x] Unknown     [] Other                                             Interventions offered during this visit: (See comments for more details)    Patient Interventions: Affirmation of emotions/emotional suffering, Initial/Spiritual assessment, patient floor           Plan of Care:     [] Support spiritual and/or cultural needs    [] Support AMD and/or advance care planning process      [] Support grieving process   [] Coordinate Rites and/or Rituals    [] Coordination with community clergy   [] No spiritual needs identified at this time   [] Detailed Plan of Care below (See Comments)  [] Make referral to Music Therapy  [] Make referral to Pet Therapy     [] Make referral to Addiction services  [] Make referral to [facility]  [] Make referral to Spiritual Care Partner  [] No future visits requested        [x] Follow up visits as needed     Comments: Visited with Mr. Shantell Higgins for initial spiritual assessment. Pt's case discussed this morning with Palliative IDT and chart reviewed. Interaction with patient was limited during my visit and no family was present. Pt indicated that he was just waking up and he wasn't really sure what was going on. Offered presence and support. Consulted with pt's nurse following my visit. Chaplains remain available for support as needed.     Wilma Danielle, Palliative

## 2020-02-07 NOTE — PROGRESS NOTES
Initial Nutrition Assessment:    INTERVENTIONS/RECOMMENDATIONS:   · Continue current diet  · Supplements: Add Glucerna BID and gelatein daily     ASSESSMENT:   Patient medically noted for acute on chronic respiratory failure, CHF, JOGRE, and pulmonary edema. PMH metastatic renal cell carcinoma. HTN, DM, and CAD. Referral received for PI. Receiving a consistent carb/low Na diet. Patient reports a poor appetite during visit; family confirms that he is only eating bites at a time which has been ongoing for a few months. 100% PO documented this morning per flowsheets. Patient denies weight loss but also reports increased fluid in lower extremities. Has tried supplements in the past. Agreeable to Glucerna shakes and will trial gelatein as well. Provided menu and explanation of room service. Palliative care following. Diet Order: Consistent carb(2g Na)  % Eaten:    Patient Vitals for the past 72 hrs:   % Diet Eaten   02/07/20 0800 100 %       Pertinent Medications: [x]Reviewed []Other: Atorvastatin, Bumex, Famotidine, Lantus, Humalog, Lopressor   Pertinent Labs: [x]Reviewed []Other: Na 133, [phone]  Food Allergies: [x]None []Yes:    Last BM:    [x]Active     []Hyperactive  []Hypoactive       [] Absent BS  Skin:    [] Intact   [] Incision  [x] Breakdown: Unstageable left heel [] Edema []Other:    Anthropometrics:   Height: 5' 11\" (180.3 cm) Weight: (!) 164 kg (361 lb 8.9 oz)   IBW (%IBW):   ( ) UBW (%UBW):   (  %)   Last Weight Metrics:  Weight Loss Metrics 2/7/2020 12/2/2019 1/28/2014   Today's Wt 361 lb 8.9 oz 348 lb 1.7 oz 375 lb   BMI 50.43 kg/m2 48.55 kg/m2 -       BMI: Body mass index is 50.43 kg/m². This BMI is indicative of:   []Underweight    []Normal    []Overweight    [] Obesity   [x] Extreme Obesity (BMI>40)     Estimated Nutrition Needs (Based on):   2127 Kcals/day(BMR (2397) x 1. 2AF -750kcal) , 101 g(1.3 g/kg IBW) Protein  Carbohydrate:  At Least 130 g/day  Fluids: 1800 mL/day (1ml/kcal)    Pt expected to meet estimated nutrient needs: [x]Yes []No    NUTRITION DIAGNOSES:   Problem:  Increased nutrient needs      Etiology: related to wound healing, body habitus     Signs/Symptoms: as evidenced by unstageable left heel, BMI 50      NUTRITION INTERVENTIONS:  Meals/Snacks: General/healthful diet   Supplements: Commercial supplement              GOAL:   PO intake >50% of meals/supplements next 3-5 days    LEARNING NEEDS (Diet, Food/Nutrient-Drug Interaction):    [x] None Identified   [] Identified and Education Provided/Documented   [] Identified and Pt declined/was not appropriate     Cultural, Voodoo, OR Ethnic Dietary Needs:    [x] None Identified   [] Identified and Addressed     [x] Interdisciplinary Care Plan Reviewed/Documented    [x] Discharge Planning: Consistent Carb/Heart healthy diet       MONITORING /EVALUATION:   Food/Nutrient Intake Outcomes:  Total energy intake  Physical Signs/Symptoms Outcomes: Weight/weight change, Electrolyte and renal profile, Glucose profile    NUTRITION RISK:    [x] Patient At Nutritional Risk              [] Patient Not at Nutritional Risk    PT SEEN FOR:    []  MD Consult: []Calorie Count      []Diabetic Diet Education        []Diet Education     []Electrolyte Management     []General Nutrition Management and Supplements     []Management of Tube Feeding     []TPN Recommendations    [x]  RN Referral:  []MST score >=2     []Enteral/Parenteral Nutrition PTA     []Pregnant: Gestational DM or Multigestation     [x]Pressure Ulcer/Wound Care needs        []  Low BMI  []  NAYLA Kanglo 0999  Pager [phone]    Weekend Pager [phone]

## 2020-02-07 NOTE — PROGRESS NOTES
Pharmacist Retrospective Admission Medication History    The patient was not interviewed regarding clarification of the prior to admission medication regimen. [facility]. Information Obtained From: Angel Chaidez    Recommendations/Findings: The following amendments were made to the patient's active medication list on file at [facility]:     1) Additions:   DUO-nebs  Ketoconazole shampoo    2) Removals: NONE    3) Changes: NONE    PTA medication list was corrected to the following:     Prior to Admission Medications   Prescriptions Last Dose Informant Patient Reported? Taking? albuterol-ipratropium (DUO-NEB) 2.5 mg-0.5 mg/3 ml nebu  at Unknown Other Yes Yes   Sig: 3 mL by Nebulization route every six (6) hours as needed for Wheezing. apixaban (ELIQUIS) 5 mg tablet 2/5/2020 at Unknown time Other No Yes   Sig: Take 1 Tab by mouth every twelve (12) hours. atorvastatin (LIPITOR) 80 mg tablet 2/5/2020 at Unknown time Other Yes Yes   Sig: Take 40 mg by mouth nightly. cabozantinib 40 mg tab  at Unknown Other Yes Yes   Sig: Take 40 mg by mouth daily. cholecalciferol (VITAMIN D3) 25 mcg (1,000 unit) cap 2/5/2020 at Unknown time Other Yes Yes   Sig: Take 2,000 Units by mouth daily. cyanocobalamin (VITAMIN B12) 250 mcg tablet 2/5/2020 at Unknown time Other Yes Yes   Sig: Take 1,000 mcg by mouth two (2) times a day. docusate sodium (COLACE) 100 mg capsule 2/5/2020 at Unknown time Other Yes Yes   Sig: Take 200 mg by mouth three (3) times daily. febuxostat (ULORIC) 80 mg tab tablet 2/5/2020 at Unknown time Other Yes Yes   Sig: Take 80 mg by mouth daily. finasteride (PROSCAR) 5 mg tablet 2/5/2020 at Unknown time Other Yes Yes   Sig: Take 5 mg by mouth nightly.    folic acid (FOLVITE) 1 mg tablet 2/5/2020 at Unknown time Other Yes Yes   Sig: Take 2 tabs by mouth every morning and Take 3 tabs by mouth at bedtime   insulin glargine (LANTUS,BASAGLAR) 100 unit/mL (3 mL) inpn 2020 at Unknown time Other Yes Yes   Si Units by SubCUTAneous route daily. insulin lispro (HUMALOG U-100 INSULIN SC)  at Unknown Other Yes Yes   Sig: by SubCUTAneous route. Per sliding scale   isosorbide mononitrate ER (IMDUR) 30 mg tablet 2020 at Unknown Other Yes Yes   Sig: Take 30 mg by mouth daily. ketoconazole (NIZORAL) 2 % shampoo  at Unknown Other Yes Yes   Sig: Apply  to affected area daily as needed for Itching (Apply to body, scalp or face). metoprolol tartrate (LOPRESSOR) 100 mg IR tablet 2020 at Unknown time Other No Yes   Sig: Take 1 Tab by mouth two (2) times a day. multivitamin (ONE A DAY) tablet 2020 at Unknown time Other Yes Yes   Sig: Take 1 Tab by mouth daily. nitroglycerin (NITROSTAT) 0.4 mg SL tablet 2020 at Unknown time Other Yes Yes   Si.4 mg by SubLINGual route every five (5) minutes as needed for Chest Pain. Up to 3 doses. oxyCODONE-acetaminophen (PERCOCET) 5-325 mg per tablet 2020 at Unknown time Other Yes Yes   Sig: Take 1 Tab by mouth every six (6) hours as needed for Pain.   pantoprazole (PROTONIX) 40 mg tablet 2020 at Unknown time Other Yes Yes   Sig: Take 40 mg by mouth daily. potassium chloride SR (KLOR-CON 10) 10 mEq tablet 2020 at Unknown time Other Yes Yes   Sig: Take 30 mEq by mouth two (2) times a day. pyridoxine, vitamin B6, (VITAMIN B-6) 50 mg cap 2020 at Unknown time Other Yes Yes   Sig: Take 1 Cap by mouth daily. raNITIdine (ZANTAC) 150 mg tablet Not Taking at Unknown time Other Yes No   Sig: Take 150 mg by mouth two (2) times a day. sennosides (SENNA) 8.6 mg cap 2020 at Unknown time Other Yes Yes   Sig: Take 1 Cap by mouth nightly. terazosin (HYTRIN) 10 mg capsule 2020 at Unknown time Other Yes Yes   Sig: Take 2 mg by mouth nightly. torsemide (DEMADEX) 20 mg tablet 2020 at Unknown time Other Yes Yes   Sig: Take 60 mg by mouth two (2) times a day.       Facility-Administered Medications: None        Thank you,  Yoon David, PHARMD

## 2020-02-07 NOTE — PROGRESS NOTES
Nephrology Progress Note  [facility]  [email or web address]                  Phone - [phone]       Patient: Amalia Jordan   Date- 2/7/2020        Admit Date: 2/5/2020  YOB: 1945             CC: Follow up for kobi          Subjective: Interval History:   -  Cr. Remained high  Not much urine out put  He is hypoxic  As per wife he hasn't been active for last few months. He was in hospital then he was sent to rehab in [place]. He was discharged from rehab on feb 4th. He came to er on feb 5th. He has been on chemo for metastatic renal cancer for last 5 years. He stopped his chemo on his own last year  He has blood clot in urine. ROS:- as above   Assessment & Plan:     kobi   ckd 3 - likely due to DM and HTN  Edema/ chf  H/o right nephrectomy    Metastatic renal ca- lung mets and bone mets. Patient stopped his chemo on his own last year. Resp. Failure  Anemia  Hyponatremia likely due to CHF  Hematuria  H/o renal cancer         PLAN-  - increase bumex dose  Give diuril  D.w wife regarding code status. She agrees to be DNR but patient wants to be full code. Prognosis POOR.      Physical exam:   GEN: mild distress  NECK- Supple, no mass  RESP: coarse  b/l, no wheezing, + accessory muscle use  CVS: S1,S2  RRR  ABDO: soft , non tender, No mass  NEURO: normal speech, non focal  EXT: + Edema   Guzman +- hematuria +  Care Plan discussed with: patient, wife, nurse  Objective:   Visit Vitals  /70 (BP 1 Location: Left arm, BP Patient Position: At rest)   Pulse 87   Temp 97.5 °F (36.4 °C)   Resp 22   Ht 5' 11\" (1.803 m)   Wt (!) 164 kg (361 lb 8.9 oz)   SpO2 94%   BMI 50.43 kg/m²     Last 3 Recorded Weights in this Encounter    02/05/20 2051 02/06/20 0501 02/07/20 0449   Weight: (!) 161 kg (355 lb) (!) 165 kg (363 lb 12.1 oz) (!) 164 kg (361 lb 8.9 oz)     02/05 1901 - 02/07 0700  In: 90 [P.O.:60]  Out: 1175 [[id number]]    Intake/Output Summary (Last 24 hours) at 2/7/2020 1120  Last data filed at 2/7/2020 1004  Gross per 24 hour   Intake 830 ml   Output 1700 ml   Net -870 ml      Chart reviewed. Pertinent Notes reviewed. Medication list  reviewed   Current Facility-Administered Medications   Medication    insulin glargine (LANTUS) injection 40 Units    bumetanide (BUMEX) injection 3 mg    chlorothiazide (DIURIL) 500 mg in sterile water (preservative free) 18 mL injection    sodium chloride (NS) flush 5-40 mL    sodium chloride (NS) flush 5-40 mL    acetaminophen (TYLENOL) tablet 650 mg    ondansetron (ZOFRAN) injection 4 mg    [Held by provider] apixaban (ELIQUIS) tablet 5 mg    atorvastatin (LIPITOR) tablet 40 mg    . PHARMACY TO SUBSTITUTE PER PROTOCOL (Reordered from: cabozantinib 40 mg tab)    febuxostat (ULORIC) tablet 80 mg    finasteride (PROSCAR) tablet 5 mg    metoprolol tartrate (LOPRESSOR) tablet 25 mg    oxyCODONE-acetaminophen (PERCOCET) 5-325 mg per tablet 1 Tab    terazosin (HYTRIN) capsule 10 mg    insulin lispro (HUMALOG) injection    glucose chewable tablet 16 g    dextrose (D50W) injection syrg 12.5-25 g    glucagon (GLUCAGEN) injection 1 mg    famotidine (PEPCID) tablet 20 mg    albuterol-ipratropium (DUO-NEB) 2.5 MG-0.5 MG/3 ML    neomycin-bacitracin-polymyxin (NEOSPORIN) ointment           Data Review :  Recent Labs     02/07/20  0017 02/06/20  0534 02/05/20  2106   * 132* 132*   K 4.8 5.0 5.1   CL 96* 95* 95*   CO2 30 30 31   BUN 65* 65* 63*   CREA 1.82* 1.73* 1.82*   * 217* 216*   CA 8.5  8.6 8.9 9.0     Recent Labs     02/07/20  1052 02/07/20  0017 02/06/20  1437 02/06/20  0534 02/05/20  2106   WBC  --  7.3  --  7.2 8.4   HGB 8.6* 8.4* 9.4* 9.5* 10.2*   HCT 27.8* 27.8* 31.5* 31.9* 33.6*   PLT  --  230  --  243 292     No results for input(s): FE, TIBC, PSAT, FERR in the last 72 hours.    Recent Labs     02/05/20  2106   TROIQ <0.05     Lab Results Component Value Date/Time    Color YELLOW/STRAW 02/06/2020 05:28 AM    Appearance CLEAR 02/06/2020 05:28 AM    Specific gravity 1.016 02/06/2020 05:28 AM    pH (UA) 5.5 02/06/2020 05:28 AM    Protein TRACE (A) 02/06/2020 05:28 AM    Glucose NEGATIVE  02/06/2020 05:28 AM    Ketone NEGATIVE  02/06/2020 05:28 AM    Bilirubin NEGATIVE  02/06/2020 05:28 AM    Urobilinogen 1.0 02/06/2020 05:28 AM    Nitrites NEGATIVE  02/06/2020 05:28 AM    Leukocyte Esterase NEGATIVE  02/06/2020 05:28 AM    Epithelial cells FEW 02/06/2020 05:28 AM    Bacteria NEGATIVE  02/06/2020 05:28 AM    WBC 0-4 02/06/2020 05:28 AM    RBC  02/06/2020 05:28 AM     Lab Results   Component Value Date/Time    Culture result: NO GROWTH 2 DAYS 02/05/2020 09:06 PM     No results found for: SOFIA  No results found for: Ro Daniels  Results from Hospital Encounter encounter on 02/05/20   XR CHEST PORT    Narrative INDICATION:  Shortness of breath     EXAM: Chest single view. COMPARISON: 12/1/2019. FINDINGS: A single frontal view of the chest at 2103 hours shows perihilar  parenchymal opacities new since the prior study, with a poor inspiratory  effort. .  The heart, mediastinum and pulmonary vasculature are stable with  cardiomegaly . The bony thorax is unremarkable for age. .      Impression IMPRESSION:  CHF pattern is suspected. Lori Somers MD  [facility]   [email or web address] [facility]  [address], [address]  [place], [address]  Phone - [phone]         Fax - [phone]

## 2020-02-07 NOTE — PROGRESS NOTES
Wound noted to R upper ear - this was present on admission per pt's spouse from use of nasal cannula. Also non-blanchable redness to L upper ear likely from oxygen tubing. Tubing cushion covers placed. 0900  Urine bloody -- Guzman irrigated with approx 500cc NS with clots removed until urine clear. Per Nephro- monitor output, if not sufficient/requiring frequent irrigation, request CBI from urology. 4:24 PM  Guzman draining yellow urine with some sediment noted - good output > diuresis. Discussed BP trend and Hydralazine held -- TORB to hold Metop dose now.

## 2020-02-07 NOTE — PROGRESS NOTES
Some clots flushed last night. Larger Guzman placed. Old blood in tubing now. Continue holding Eliquis, may be resume 2/8. Following.

## 2020-02-07 NOTE — PROGRESS NOTES
Palliative Medicine  [facility]: [phone] ([phone])  [facility]: [phone] ([phone])      GOALS OF CARE: Full Restorative Care / Full Code status. Patient has verbalized (and wife has corroborated) to palliative team members that he wants to live as long as possible, because he wants his wife to receive financial benefits of his being alive, he is concerned she won't have enough money to support herself if/when he dies. Wife has reassured patient that she will be fine and can manage, but he continues to worry about her. TREATMENT PREFERENCES:   Code Status: Full Code    Advance Care Planning:  [] The [facility] [place] Interdisciplinary Team has updated the ACP Navigator with [address] and Patient Capacity      Primary Decision Maker: Jesenia Cage - [phone]       Patient / Family Encounter Documentation    Participants (names): Patient, wife [address], her daughter Mirtha Paul and Bubba Lowery LCSW     Narrative: Doretha Lawton is a 74 yo  man with a diagnosis of widely metastatic renal cell CA. He is an obese man, who seems to be essentially bed bound at this time. He came to the ED with SOB. His wife of 22 years, [facility], is his primary care giver. Patient is a [facility] who was admitted to [facility] as the [place] was on diversion. Patient does not seem to have much quality of life, yet he is set on \"staying alive as long as possible\", so his wife can get his social security benefit. He is worried about her financially, although she has reassured him that she will be fine. Psychosocial Issues Identified: 1. Code Status addressed: Patient is verbalizing a desire to remain Full Code, despite knowing that he will not have any quality of life and will likely not survive. His desire, verbally stated today, is that his wife will get financial benefit from his social security as long as he is alive.  He is also very worried that she will only get a death benefit if he dies \"because of my heart or my kidneys\", not if cancer is listed as his cause of death. He notes that he has been associated with [employer] disability. 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 2. Realistic and supportive family: Wife [facility] noted that if it were up to her, she would not prolong patient's life, she would make the decision to keep him comfortable and not let him linger. She does not feel she can make that decision at this time, since he is decisional at this time. Brenna has told patient not to worry about her. 3. Financial worries: Patient remains very concerned about his finances and leaving his wife without income. This is the driving force for his decision to stay alive. 4. Patient remains somewhat angry about his cancer diagnosis, feels the [place] \"did not treat me right\" regarding treating his cancer and \"they gave me fluid so now I have extra fluid\". He does also have a sense of humor which he uses with Brenna. Goals of Care / Plan: Goals are clear at this time.

## 2020-02-07 NOTE — PROGRESS NOTES
Bedside shift change report given to Gunner Hilton RN (oncoming nurse) by Karlo Garcia RN (offgoing nurse). Report included the following information SBAR, Kardex, MAR, Recent Results and Cardiac Rhythm afib-NSR.

## 2020-02-08 LAB
ALBUMIN SERPL-MCNC: 2.6 G/DL (ref 3.5–5)
ALBUMIN/GLOB SERPL: 0.8 {RATIO} (ref 1.1–2.2)
ALP SERPL-CCNC: 462 U/L (ref 45–117)
ALT SERPL-CCNC: 20 U/L (ref 12–78)
ANA SER QL: NEGATIVE
ANION GAP SERPL CALC-SCNC: 7 MMOL/L (ref 5–15)
AST SERPL-CCNC: 22 U/L (ref 15–37)
BASOPHILS # BLD: 0 K/UL (ref 0–0.1)
BASOPHILS NFR BLD: 0 % (ref 0–1)
BILIRUB SERPL-MCNC: 0.7 MG/DL (ref 0.2–1)
BUN SERPL-MCNC: 66 MG/DL (ref 6–20)
BUN/CREAT SERPL: 36 (ref 12–20)
C3 SERPL-MCNC: 110 MG/DL (ref 82–167)
C4 SERPL-MCNC: 34 MG/DL (ref 14–44)
CALCIUM SERPL-MCNC: 8.5 MG/DL (ref 8.5–10.1)
CHLORIDE SERPL-SCNC: 95 MMOL/L (ref 97–108)
CO2 SERPL-SCNC: 31 MMOL/L (ref 21–32)
CREAT SERPL-MCNC: 1.81 MG/DL (ref 0.7–1.3)
DIFFERENTIAL METHOD BLD: ABNORMAL
EOSINOPHIL # BLD: 0.1 K/UL (ref 0–0.4)
EOSINOPHIL NFR BLD: 1 % (ref 0–7)
ERYTHROCYTE [DISTWIDTH] IN BLOOD BY AUTOMATED COUNT: 19 % (ref 11.5–14.5)
GLOBULIN SER CALC-MCNC: 3.2 G/DL (ref 2–4)
GLUCOSE BLD STRIP.AUTO-MCNC: 253 MG/DL (ref 65–100)
GLUCOSE BLD STRIP.AUTO-MCNC: 280 MG/DL (ref 65–100)
GLUCOSE BLD STRIP.AUTO-MCNC: 347 MG/DL (ref 65–100)
GLUCOSE BLD STRIP.AUTO-MCNC: 348 MG/DL (ref 65–100)
GLUCOSE SERPL-MCNC: 224 MG/DL (ref 65–100)
HCT VFR BLD AUTO: 27.4 % (ref 36.6–50.3)
HGB BLD-MCNC: 8.4 G/DL (ref 12.1–17)
IMM GRANULOCYTES # BLD AUTO: 0.1 K/UL (ref 0–0.04)
IMM GRANULOCYTES NFR BLD AUTO: 1 % (ref 0–0.5)
LYMPHOCYTES # BLD: 0.6 K/UL (ref 0.8–3.5)
LYMPHOCYTES NFR BLD: 5 % (ref 12–49)
MCH RBC QN AUTO: 28.9 PG (ref 26–34)
MCHC RBC AUTO-ENTMCNC: 30.7 G/DL (ref 30–36.5)
MCV RBC AUTO: 94.2 FL (ref 80–99)
MONOCYTES # BLD: 1.2 K/UL (ref 0–1)
MONOCYTES NFR BLD: 10 % (ref 5–13)
NEUTS SEG # BLD: 9.5 K/UL (ref 1.8–8)
NEUTS SEG NFR BLD: 83 % (ref 32–75)
NRBC # BLD: 0.05 K/UL (ref 0–0.01)
NRBC BLD-RTO: 0.4 PER 100 WBC
PHOSPHATE SERPL-MCNC: 2.8 MG/DL (ref 2.6–4.7)
PLATELET # BLD AUTO: 209 K/UL (ref 150–400)
PMV BLD AUTO: 9.1 FL (ref 8.9–12.9)
POTASSIUM SERPL-SCNC: 4.2 MMOL/L (ref 3.5–5.1)
PROT SERPL-MCNC: 5.8 G/DL (ref 6.4–8.2)
RBC # BLD AUTO: 2.91 M/UL (ref 4.1–5.7)
RBC MORPH BLD: ABNORMAL
SERVICE CMNT-IMP: ABNORMAL
SODIUM SERPL-SCNC: 133 MMOL/L (ref 136–145)
WBC # BLD AUTO: 11.5 K/UL (ref 4.1–11.1)

## 2020-02-08 PROCEDURE — 74011000250 HC RX REV CODE- 250: Performed by: INTERNAL MEDICINE

## 2020-02-08 PROCEDURE — 85025 COMPLETE CBC W/AUTO DIFF WBC: CPT

## 2020-02-08 PROCEDURE — 84100 ASSAY OF PHOSPHORUS: CPT

## 2020-02-08 PROCEDURE — 74011636637 HC RX REV CODE- 636/637: Performed by: INTERNAL MEDICINE

## 2020-02-08 PROCEDURE — 80053 COMPREHEN METABOLIC PANEL: CPT

## 2020-02-08 PROCEDURE — 74011250637 HC RX REV CODE- 250/637: Performed by: INTERNAL MEDICINE

## 2020-02-08 PROCEDURE — 94640 AIRWAY INHALATION TREATMENT: CPT

## 2020-02-08 PROCEDURE — 77010033678 HC OXYGEN DAILY

## 2020-02-08 PROCEDURE — 36415 COLL VENOUS BLD VENIPUNCTURE: CPT

## 2020-02-08 PROCEDURE — 65660000000 HC RM CCU STEPDOWN

## 2020-02-08 PROCEDURE — 82962 GLUCOSE BLOOD TEST: CPT

## 2020-02-08 RX ADMIN — BUMETANIDE 3 MG: 0.25 INJECTION INTRAMUSCULAR; INTRAVENOUS at 05:44

## 2020-02-08 RX ADMIN — FINASTERIDE 5 MG: 5 TABLET, FILM COATED ORAL at 21:51

## 2020-02-08 RX ADMIN — POLYMYXIN B SULFATE, BACITRACIN ZINC, NEOMYCIN SULFATE: 5000; 3.5; 4 OINTMENT TOPICAL at 09:57

## 2020-02-08 RX ADMIN — IPRATROPIUM BROMIDE AND ALBUTEROL SULFATE 3 ML: .5; 3 SOLUTION RESPIRATORY (INHALATION) at 07:58

## 2020-02-08 RX ADMIN — INSULIN LISPRO 5 UNITS: 100 INJECTION, SOLUTION INTRAVENOUS; SUBCUTANEOUS at 09:08

## 2020-02-08 RX ADMIN — ACETAMINOPHEN 650 MG: 325 TABLET ORAL at 21:51

## 2020-02-08 RX ADMIN — BUMETANIDE 3 MG: 0.25 INJECTION INTRAMUSCULAR; INTRAVENOUS at 15:47

## 2020-02-08 RX ADMIN — TERAZOSIN HYDROCHLORIDE 10 MG: 5 CAPSULE ORAL at 21:51

## 2020-02-08 RX ADMIN — APIXABAN 5 MG: 5 TABLET, FILM COATED ORAL at 21:49

## 2020-02-08 RX ADMIN — IPRATROPIUM BROMIDE AND ALBUTEROL SULFATE 3 ML: .5; 3 SOLUTION RESPIRATORY (INHALATION) at 19:18

## 2020-02-08 RX ADMIN — FOLIC ACID 2 MG: 1 TABLET ORAL at 09:09

## 2020-02-08 RX ADMIN — ACETAMINOPHEN 650 MG: 325 TABLET ORAL at 09:09

## 2020-02-08 RX ADMIN — INSULIN LISPRO 7 UNITS: 100 INJECTION, SOLUTION INTRAVENOUS; SUBCUTANEOUS at 16:22

## 2020-02-08 RX ADMIN — Medication 10 ML: at 15:47

## 2020-02-08 RX ADMIN — IPRATROPIUM BROMIDE AND ALBUTEROL SULFATE 3 ML: .5; 3 SOLUTION RESPIRATORY (INHALATION) at 01:14

## 2020-02-08 RX ADMIN — POLYMYXIN B SULFATE, BACITRACIN ZINC, NEOMYCIN SULFATE: 5000; 3.5; 4 OINTMENT TOPICAL at 18:00

## 2020-02-08 RX ADMIN — FEBUXOSTAT 80 MG: 40 TABLET ORAL at 09:00

## 2020-02-08 RX ADMIN — INSULIN LISPRO 33 UNITS: 100 INJECTION, SOLUTION INTRAVENOUS; SUBCUTANEOUS at 22:01

## 2020-02-08 RX ADMIN — PANTOPRAZOLE SODIUM 40 MG: 40 TABLET, DELAYED RELEASE ORAL at 05:44

## 2020-02-08 RX ADMIN — IPRATROPIUM BROMIDE AND ALBUTEROL SULFATE 3 ML: .5; 3 SOLUTION RESPIRATORY (INHALATION) at 14:10

## 2020-02-08 RX ADMIN — Medication 10 ML: at 21:52

## 2020-02-08 RX ADMIN — CYANOCOBALAMIN TAB 500 MCG 1000 MCG: 500 TAB at 09:09

## 2020-02-08 RX ADMIN — MELATONIN 2 TABLET: at 09:09

## 2020-02-08 RX ADMIN — OXYCODONE AND ACETAMINOPHEN 1 TABLET: 5; 325 TABLET ORAL at 00:27

## 2020-02-08 RX ADMIN — CYANOCOBALAMIN TAB 500 MCG 1000 MCG: 500 TAB at 18:00

## 2020-02-08 RX ADMIN — INSULIN LISPRO 5 UNITS: 100 INJECTION, SOLUTION INTRAVENOUS; SUBCUTANEOUS at 16:22

## 2020-02-08 RX ADMIN — Medication 10 ML: at 05:44

## 2020-02-08 RX ADMIN — INSULIN LISPRO 5 UNITS: 100 INJECTION, SOLUTION INTRAVENOUS; SUBCUTANEOUS at 13:19

## 2020-02-08 RX ADMIN — INSULIN LISPRO 7 UNITS: 100 INJECTION, SOLUTION INTRAVENOUS; SUBCUTANEOUS at 13:19

## 2020-02-08 RX ADMIN — ATORVASTATIN CALCIUM 40 MG: 40 TABLET, FILM COATED ORAL at 21:50

## 2020-02-08 RX ADMIN — ACETAMINOPHEN 650 MG: 325 TABLET ORAL at 16:22

## 2020-02-08 RX ADMIN — INSULIN GLARGINE 40 UNITS: 100 INJECTION, SOLUTION SUBCUTANEOUS at 09:08

## 2020-02-08 RX ADMIN — BUMETANIDE 3 MG: 0.25 INJECTION INTRAMUSCULAR; INTRAVENOUS at 21:52

## 2020-02-08 NOTE — PROGRESS NOTES
ADULT PROTOCOL: JET AEROSOL  REASSESSMENT    Patient  [address]     75 y.o.   male     2/7/2020  8:19 PM    Breath Sounds Pre Procedure: Right Breath Sounds: Diminished                               Left Breath Sounds: Diminished    Breath Sounds Post Procedure: Right Breath Sounds: Expiratory wheezing                                 Left Breath Sounds: Expiratory wheezing    Breathing pattern: Pre procedure Breathing Pattern: Regular          Post procedure Breathing Pattern: Regular    Heart Rate: Pre procedure Pulse: 83           Post procedure Pulse: 85    Resp Rate: Pre procedure Respirations: 18           Post procedure Respirations: 19    Cough: Pre procedure Cough: Non-productive               Post procedure Cough: Non-productive    Oxygen: O2 Device: Nasal cannula       SpO2: Pre procedure SpO2: 98 %                Post procedure SpO2: 96 %     Nebulizer Therapy: Current medications Aerosolized Medications: DuoNeb      Changed:No  Problem List:   Patient Active Problem List   Diagnosis Code    Renal cell carcinoma (HCC) C64.9    Paroxysmal A-fib (HCC) I48.0    Hematuria R31.9    Acute on chronic respiratory failure with hypoxia (HCC) J96.21    Anasarca R60.1    Acute on chronic diastolic heart failure (HCC) I50.33    Chronic pain G89.29    HTN (hypertension) I10    Type 2 diabetes mellitus (HCC) E11.9    CAD (coronary artery disease) I25.10    BPH (benign prostatic hyperplasia) N40.0    Hyperlipidemia E78.5    Gout M10.9       Respiratory Therapist: Yordy Singh RT

## 2020-02-08 NOTE — PROGRESS NOTES
..                              Basilio [address] [place] [address]  YOB: 1945          Assessment & Plan:   1.  JORGE/CKD3  - LABS STABLE  - NO ISSUES WITH K ON BUMEX  - CONTINUE DIURESIS FOR NOW  - FOLLOW LABS  - OTHER ISSUES NOTED       Subjective:   CC: JORGE/CK3  HPI: Patient appears to be improved s/p sig uo with high octane bumex dosing. Says breathing better. ROS: see above   Current Facility-Administered Medications   Medication Dose Route Frequency    insulin glargine (LANTUS) injection 40 Units  40 Units SubCUTAneous DAILY    bumetanide (BUMEX) injection 3 mg  3 mg IntraVENous Q8H    hydrALAZINE (APRESOLINE) 20 mg/mL injection 20 mg  20 mg IntraVENous Q6H PRN    cyanocobalamin (VITAMIN B12) tablet 1,000 mcg  1,000 mcg Oral BID    folic acid (FOLVITE) tablet 2 mg  2 mg Oral 7am    pantoprazole (PROTONIX) tablet 40 mg  40 mg Oral DAILY    potassium chloride SR (KLOR-CON 10) tablet 30 mEq  30 mEq Oral BID    pyridoxine (vitamin B6) (VITAMIN B-6) tablet 100 mg  100 mg Oral DAILY    cholecalciferol (VITAMIN D3) (1000 Units /25 mcg) tablet 2 Tab  2,000 Units Oral DAILY    insulin lispro (HUMALOG) injection 5 Units  5 Units SubCUTAneous TIDAC    **Can pt supply own Cabozantinib 40 mg tab (Cabometyx, Cometriq )    1 Each Other DAILY    metoprolol tartrate (LOPRESSOR) tablet 6.25 mg  6.25 mg Oral BID    sodium chloride (NS) flush 5-40 mL  5-40 mL IntraVENous Q8H    sodium chloride (NS) flush 5-40 mL  5-40 mL IntraVENous PRN    acetaminophen (TYLENOL) tablet 650 mg  650 mg Oral Q6H PRN    ondansetron (ZOFRAN) injection 4 mg  4 mg IntraVENous Q4H PRN    [Held by provider] apixaban (ELIQUIS) tablet 5 mg  5 mg Oral Q12H    atorvastatin (LIPITOR) tablet 40 mg  40 mg Oral QHS    . PHARMACY TO SUBSTITUTE PER PROTOCOL (Reordered from: cabozantinib 40 mg tab)    Per Protocol    febuxostat (ULORIC) tablet 80 mg  80 mg Oral DAILY    finasteride (PROSCAR) tablet 5 mg  5 mg Oral QHS    oxyCODONE-acetaminophen (PERCOCET) 5-325 mg per tablet 1 Tab  1 Tab Oral Q6H PRN    terazosin (HYTRIN) capsule 10 mg  10 mg Oral QHS    insulin lispro (HUMALOG) injection   SubCUTAneous AC&HS    glucose chewable tablet 16 g  4 Tab Oral PRN    dextrose (D50W) injection syrg 12.5-25 g  12.5-25 g IntraVENous PRN    glucagon (GLUCAGEN) injection 1 mg  1 mg IntraMUSCular PRN    albuterol-ipratropium (DUO-NEB) 2.5 MG-0.5 MG/3 ML  3 mL Nebulization Q6H RT    neomycin-bacitracin-polymyxin (NEOSPORIN) ointment   Topical BID          Objective:     Vitals:  Blood pressure 136/61, pulse 93, temperature 98.8 °F (37.1 °C), resp. rate 20, height 5' 11\" (1.803 m), weight (!) 164.6 kg (362 lb 14 oz), SpO2 96 %. Temp (24hrs), Av.8 °F (36.6 °C), Min:97.2 °F (36.2 °C), Max:98.8 °F (37.1 °C)      Intake and Output:  No intake/output data recorded.  1901 -  0700  In: 1030 [P.O.:300]  Out: 3700 [Urine:3700]    Physical Exam:                Patient is intubated:  n    Physical Examination:   GENERAL ASSESSMENT: NAD  HEENT:anicteric  CHEST: symmetric exp  Extrem: + edema  ABDOMEN: Soft,NT,  NEURO: alert          ECG/rhythm:    Data Review      Recent Labs     20   TNIPOC <0.04      Recent Labs     20   TROIQ <0.05     Recent Labs     20  0044 20  1052 20  0017  20  0534   *  --  133*  --  132*   K 4.2  --  4.8  --  5.0   CL 95*  --  96*  --  95*   CO2 31  --  30  --  30   BUN 66*  --  65*  --  65*   CREA 1.81*  --  1.82*  --  1.73*   *  --  199*  --  217*   PHOS 2.8  --   --   --   --    CA 8.5  --  8.5  8.6  --  8.9   ALB 2.6*  --  2.6*  --  2.8*   WBC 11.5*  --  7.3  --  7.2   HGB 8.4* 8.6* 8.4*   < > 9.5*   HCT 27.4* 27.8* 27.8*   < > 31.9*     --  230  --  243    < > = values in this interval not displayed. No results for input(s): INR, PTP, APTT, INREXT in the last 72 hours.   Needs: urine analysis, urine sodium, protein and creatinine  No results found for: Marisela Kaminski      Discussed with:  pt  : Lynda Rodríguez MD  2/8/2020        [facility]:  [email or web address]  [place] office:  [address] [place] [address], [address] [place] [address],[address]  [address]  Phone: [phone]  Fax :     [phone]    [facility] office:  [address] [facility], [address] [place] [address],  [address]  Phone - [phone]  Fax - [phone]

## 2020-02-08 NOTE — PROGRESS NOTES
Hospitalist Progress Note    NAME: Binh [facility] Marie   :  1945   MRN:  [id number]       Assessment / Plan:  Acute on chronic respiratory failure with hypoxia, baseline 2L   Anasarca  Brenden with CKD3  Due to Acute on chronic diastolic heart failure  Still on NC 6liter   PRN BiPAP  Given 1mg IV Bumex in ED without diuresis, s/p 2mg IV BID,increased to bumex 3mg q8h per nephro . Also ordered  Diuril 500mg IV once   Receive a dose of metalozone 5mg PO on   Monitor I/Os, daily weight and lytes  Appreciate Nephrology input and management   UA negative for proteinuria   Renal US showed :  1. Left kidney is within normal limits. 2. Patient is status post right nephrectomy. In the right nephrectomy bed, there  is a 4.5 cm nonspecific hypoechoic mass. 3. Urinary bladder is unremarkable.       -UO 3 L in the last 24 hr  -breathing better now  -Cr stable from   -electrolytes are stable too      Hematuria   Penile lesion   uro consulted , persistent hematuria ,had intermittent bladder irrigation and davis changed on   Hb stable , monitor H&H q8h as pt on eliquis       -resume Eliquis 5 mg BID as per Urology recommendations  - no further hematuria     Hypervolemic Hypernatremia  -secondary to CHF  -cont Bumix to treat the hypervolemia   - f/u BMP daily    B/l leg redness without warmth and tenderness  Monitor leg swelling as risk for cellulitis, low threshold to start abx     Renal cell carcinoma s/p R Nephrectomy     Chronic A-fib, rate controlled  HTN  Mild bradycardia, doesn't remember the exact dose for metoprolol and claims meds has been adjusted recently  metoprolol dose 12.5mg bid ( decreased from home dose) with holding parameters       -resume Eliquis 5 mg BID as per Urology recommendations    Chronic pain   Continue Percocet     Type 2 diabetes mellitus   BS elevated , increase lantus to 40units   Add lipsro 5units TIDAC   Correction scale      CAD (coronary artery disease)     BPH (benign prostatic hyperplasia)  Continue Hytrin and Finesteride     Hyperlipidemia   Continue statins     Gout   Continue Uloric    Morbid obesity      Code Status: Full  Surrogate Decision Maker: Wife     DVT Prophylaxis: Eliquis  GI Prophylaxis: not indicated     Baseline: functional      40 or above Morbid obesity / Body mass index is 50.61 kg/m². Subjective:     Chief Complaint / Reason for Physician Visit  FU CHF . Still with anasarca and SOB , requiring 6liter of oxygen      Discussed with RN events overnight. Delaney Vaca can I go home? \". Review of Systems:  Symptom Y/N Comments  Symptom Y/N Comments   Fever/Chills    Chest Pain n    Poor Appetite    Edema y    Cough    Abdominal Pain n    Sputum    Joint Pain     SOB/LOMBARDI y   Pruritis/Rash     Nausea/vomit n   Tolerating PT/OT     Diarrhea    Tolerating Diet y    Constipation    Other             Objective:     VITALS:   Last 24hrs VS reviewed since prior progress note. Most recent are:  Patient Vitals for the past 24 hrs:   Temp Pulse Resp BP SpO2   02/08/20 0838 97.4 °F (36.3 °C) 85 18 (!) 107/38 95 %   02/08/20 0758     96 %   02/08/20 0541 98.8 °F (37.1 °C) 93 20 136/61 93 %   02/08/20 0115     94 %   02/08/20 0035 98.4 °F (36.9 °C) 90 21 150/50 94 %   02/07/20 2120  78  138/49    02/07/20 1931 97.6 °F (36.4 °C) 89 19 129/59 94 %   02/07/20 1905     98 %   02/07/20 1839  81   94 %   02/07/20 1526 97.4 °F (36.3 °C) 75 20 118/41 92 %   02/07/20 1327     93 %   02/07/20 1130 97.2 °F (36.2 °C)       02/07/20 1058  68 22 102/70 91 %       Intake/Output Summary (Last 24 hours) at 2/8/2020 0948  Last data filed at 2/8/2020 0919  Gross per 24 hour   Intake 940 ml   Output 3000 ml   Net -2060 ml        PHYSICAL EXAM:  General: WD, WN. Alert, cooperative, no acute distress    EENT:  EOMI. Anicteric sclerae. MMM  Resp:  Diminished breath sounds b/l  , no wheezing or rales.   No accessory muscle use  CV:  Regular  rhythm, LE  Edema 3+ , anasarca   GI:  Soft, Non distended, Non tender.  +Bowel sounds  Neurologic:  Alert and oriented X 3, normal speech,   Psych:   Good insight. Not anxious nor agitated  Skin:  No rashes. No jaundice, petichae on b/l legs   Penile lesion   : swollen testicles and hematuria in davis bag     Reviewed most current lab test results and cultures  YES  Reviewed most current radiology test results   YES  Review and summation of old records today    NO  Reviewed patient's current orders and MAR    YES  PMH/SH reviewed - no change compared to H&P  ________________________________________________________________________  Care Plan discussed with:    Comments   Patient x    Family      RN x    Care Manager     Consultant                        Multidiciplinary team rounds were held today with , nursing, pharmacist and clinical coordinator. Patient's plan of care was discussed; medications were reviewed and discharge planning was addressed. ________________________________________________________________________  Total NON critical care TIME:  35  Minutes    Total CRITICAL CARE TIME Spent:   Minutes non procedure based      Comments   >50% of visit spent in counseling and coordination of care x    ________________________________________________________________________  Nate Vizcarra MD     Procedures: see electronic medical records for all procedures/Xrays and details which were not copied into this note but were reviewed prior to creation of Plan. LABS:  I reviewed today's most current labs and imaging studies. Pertinent labs include:  Recent Labs     02/08/20  0044 02/07/20  1052 02/07/20  0017  02/06/20  0534   WBC 11.5*  --  7.3  --  7.2   HGB 8.4* 8.6* 8.4*   < > 9.5*   HCT 27.4* 27.8* 27.8*   < > 31.9*     --  230  --  243    < > = values in this interval not displayed.      Recent Labs     02/08/20  0044 02/07/20  0017 02/06/20  0534   * 133* 132*   K 4.2 4.8 5.0   CL 95* 96* 95* CO2 31 30 30   * 199* 217*   BUN 66* 65* 65*   CREA 1.81* 1.82* 1.73*   CA 8.5 8.5  8.6 8.9   PHOS 2.8  --   --    ALB 2.6* 2.6* 2.8*   TBILI 0.7 0.6 0.6   SGOT 22 26 33   ALT 20 20 22       Signed: Og Jose MD Detail Level: Zone

## 2020-02-08 NOTE — PROGRESS NOTES
8 AM  Urine yellow without blood/clots per Guzman this AM -- sediment noted. Pt without c/o back pain at this time. Refused meal but did take all of Glucerna nutritional supplement.      1 PM  CM aware of [facility]

## 2020-02-08 NOTE — PROGRESS NOTES
Bedside shift change report given to Trinity Powers RN (oncoming nurse) by Melodie Albright RN (offgoing nurse). Report included the following information SBAR, Kardex, MAR, Recent Results and Cardiac Rhythm afib.

## 2020-02-09 ENCOUNTER — HOSPICE ADMISSION (OUTPATIENT)
Dept: HOSPICE | Facility: HOSPICE | Age: 75
End: 2020-02-09

## 2020-02-09 LAB
ALBUMIN SERPL-MCNC: 2.4 G/DL (ref 3.5–5)
ALBUMIN/GLOB SERPL: 0.7 {RATIO} (ref 1.1–2.2)
ALP SERPL-CCNC: 403 U/L (ref 45–117)
ALT SERPL-CCNC: 20 U/L (ref 12–78)
ANION GAP SERPL CALC-SCNC: 8 MMOL/L (ref 5–15)
AST SERPL-CCNC: 24 U/L (ref 15–37)
BASOPHILS # BLD: 0 K/UL (ref 0–0.1)
BASOPHILS NFR BLD: 0 % (ref 0–1)
BILIRUB SERPL-MCNC: 0.9 MG/DL (ref 0.2–1)
BUN SERPL-MCNC: 69 MG/DL (ref 6–20)
BUN/CREAT SERPL: 40 (ref 12–20)
CALCIUM SERPL-MCNC: 8.8 MG/DL (ref 8.5–10.1)
CHLORIDE SERPL-SCNC: 96 MMOL/L (ref 97–108)
CO2 SERPL-SCNC: 33 MMOL/L (ref 21–32)
CREAT SERPL-MCNC: 1.71 MG/DL (ref 0.7–1.3)
DIFFERENTIAL METHOD BLD: ABNORMAL
EOSINOPHIL # BLD: 0.2 K/UL (ref 0–0.4)
EOSINOPHIL NFR BLD: 2 % (ref 0–7)
ERYTHROCYTE [DISTWIDTH] IN BLOOD BY AUTOMATED COUNT: 19.2 % (ref 11.5–14.5)
GLOBULIN SER CALC-MCNC: 3.4 G/DL (ref 2–4)
GLUCOSE BLD STRIP.AUTO-MCNC: 262 MG/DL (ref 65–100)
GLUCOSE BLD STRIP.AUTO-MCNC: 278 MG/DL (ref 65–100)
GLUCOSE BLD STRIP.AUTO-MCNC: 284 MG/DL (ref 65–100)
GLUCOSE BLD STRIP.AUTO-MCNC: 292 MG/DL (ref 65–100)
GLUCOSE SERPL-MCNC: 217 MG/DL (ref 65–100)
HCT VFR BLD AUTO: 26.6 % (ref 36.6–50.3)
HGB BLD-MCNC: 8.1 G/DL (ref 12.1–17)
IMM GRANULOCYTES # BLD AUTO: 0.1 K/UL (ref 0–0.04)
IMM GRANULOCYTES NFR BLD AUTO: 1 % (ref 0–0.5)
LYMPHOCYTES # BLD: 1 K/UL (ref 0.8–3.5)
LYMPHOCYTES NFR BLD: 11 % (ref 12–49)
MCH RBC QN AUTO: 29 PG (ref 26–34)
MCHC RBC AUTO-ENTMCNC: 30.5 G/DL (ref 30–36.5)
MCV RBC AUTO: 95.3 FL (ref 80–99)
MONOCYTES # BLD: 0.7 K/UL (ref 0–1)
MONOCYTES NFR BLD: 8 % (ref 5–13)
NEUTS SEG # BLD: 7.1 K/UL (ref 1.8–8)
NEUTS SEG NFR BLD: 78 % (ref 32–75)
NRBC # BLD: 0.03 K/UL (ref 0–0.01)
NRBC BLD-RTO: 0.3 PER 100 WBC
PHOSPHATE SERPL-MCNC: 3.3 MG/DL (ref 2.6–4.7)
PLATELET # BLD AUTO: 205 K/UL (ref 150–400)
PMV BLD AUTO: 9.3 FL (ref 8.9–12.9)
POTASSIUM SERPL-SCNC: 3.8 MMOL/L (ref 3.5–5.1)
PROT SERPL-MCNC: 5.8 G/DL (ref 6.4–8.2)
RBC # BLD AUTO: 2.79 M/UL (ref 4.1–5.7)
SERVICE CMNT-IMP: ABNORMAL
SODIUM SERPL-SCNC: 137 MMOL/L (ref 136–145)
WBC # BLD AUTO: 9.1 K/UL (ref 4.1–11.1)

## 2020-02-09 PROCEDURE — 74011250637 HC RX REV CODE- 250/637: Performed by: INTERNAL MEDICINE

## 2020-02-09 PROCEDURE — 74011000250 HC RX REV CODE- 250: Performed by: INTERNAL MEDICINE

## 2020-02-09 PROCEDURE — 82962 GLUCOSE BLOOD TEST: CPT

## 2020-02-09 PROCEDURE — 36415 COLL VENOUS BLD VENIPUNCTURE: CPT

## 2020-02-09 PROCEDURE — 65660000000 HC RM CCU STEPDOWN

## 2020-02-09 PROCEDURE — 94640 AIRWAY INHALATION TREATMENT: CPT

## 2020-02-09 PROCEDURE — 80053 COMPREHEN METABOLIC PANEL: CPT

## 2020-02-09 PROCEDURE — 77010033678 HC OXYGEN DAILY

## 2020-02-09 PROCEDURE — 74011636637 HC RX REV CODE- 636/637: Performed by: INTERNAL MEDICINE

## 2020-02-09 PROCEDURE — 84100 ASSAY OF PHOSPHORUS: CPT

## 2020-02-09 PROCEDURE — 85025 COMPLETE CBC W/AUTO DIFF WBC: CPT

## 2020-02-09 RX ORDER — IPRATROPIUM BROMIDE AND ALBUTEROL SULFATE 2.5; .5 MG/3ML; MG/3ML
3 SOLUTION RESPIRATORY (INHALATION)
Status: DISCONTINUED | OUTPATIENT
Start: 2020-02-10 | End: 2020-02-12

## 2020-02-09 RX ORDER — IPRATROPIUM BROMIDE AND ALBUTEROL SULFATE 2.5; .5 MG/3ML; MG/3ML
3 SOLUTION RESPIRATORY (INHALATION)
Status: DISCONTINUED | OUTPATIENT
Start: 2020-02-09 | End: 2020-02-18 | Stop reason: HOSPADM

## 2020-02-09 RX ORDER — OXYCODONE AND ACETAMINOPHEN 5; 325 MG/1; MG/1
1 TABLET ORAL
Status: DISCONTINUED | OUTPATIENT
Start: 2020-02-09 | End: 2020-02-11

## 2020-02-09 RX ADMIN — INSULIN LISPRO 5 UNITS: 100 INJECTION, SOLUTION INTRAVENOUS; SUBCUTANEOUS at 12:13

## 2020-02-09 RX ADMIN — INSULIN LISPRO 3 UNITS: 100 INJECTION, SOLUTION INTRAVENOUS; SUBCUTANEOUS at 21:32

## 2020-02-09 RX ADMIN — APIXABAN 5 MG: 5 TABLET, FILM COATED ORAL at 08:50

## 2020-02-09 RX ADMIN — ATORVASTATIN CALCIUM 40 MG: 40 TABLET, FILM COATED ORAL at 21:23

## 2020-02-09 RX ADMIN — PYRIDOXINE HCL TAB 50 MG 100 MG: 50 TAB at 08:51

## 2020-02-09 RX ADMIN — IPRATROPIUM BROMIDE AND ALBUTEROL SULFATE 3 ML: .5; 3 SOLUTION RESPIRATORY (INHALATION) at 14:22

## 2020-02-09 RX ADMIN — INSULIN LISPRO 5 UNITS: 100 INJECTION, SOLUTION INTRAVENOUS; SUBCUTANEOUS at 08:52

## 2020-02-09 RX ADMIN — FOLIC ACID 2 MG: 1 TABLET ORAL at 08:51

## 2020-02-09 RX ADMIN — CYANOCOBALAMIN TAB 500 MCG 1000 MCG: 500 TAB at 18:00

## 2020-02-09 RX ADMIN — FEBUXOSTAT 80 MG: 40 TABLET ORAL at 08:51

## 2020-02-09 RX ADMIN — OXYCODONE AND ACETAMINOPHEN 1 TABLET: 5; 325 TABLET ORAL at 16:52

## 2020-02-09 RX ADMIN — INSULIN LISPRO 5 UNITS: 100 INJECTION, SOLUTION INTRAVENOUS; SUBCUTANEOUS at 08:51

## 2020-02-09 RX ADMIN — Medication 10 ML: at 15:29

## 2020-02-09 RX ADMIN — Medication 10 ML: at 21:38

## 2020-02-09 RX ADMIN — CYANOCOBALAMIN TAB 500 MCG 1000 MCG: 500 TAB at 08:51

## 2020-02-09 RX ADMIN — POLYMYXIN B SULFATE, BACITRACIN ZINC, NEOMYCIN SULFATE: 5000; 3.5; 4 OINTMENT TOPICAL at 09:00

## 2020-02-09 RX ADMIN — IPRATROPIUM BROMIDE AND ALBUTEROL SULFATE 3 ML: .5; 3 SOLUTION RESPIRATORY (INHALATION) at 19:21

## 2020-02-09 RX ADMIN — IPRATROPIUM BROMIDE AND ALBUTEROL SULFATE 3 ML: .5; 3 SOLUTION RESPIRATORY (INHALATION) at 08:14

## 2020-02-09 RX ADMIN — INSULIN LISPRO 5 UNITS: 100 INJECTION, SOLUTION INTRAVENOUS; SUBCUTANEOUS at 17:00

## 2020-02-09 RX ADMIN — FINASTERIDE 5 MG: 5 TABLET, FILM COATED ORAL at 21:23

## 2020-02-09 RX ADMIN — APIXABAN 5 MG: 5 TABLET, FILM COATED ORAL at 21:23

## 2020-02-09 RX ADMIN — BUMETANIDE 3 MG: 0.25 INJECTION INTRAMUSCULAR; INTRAVENOUS at 05:05

## 2020-02-09 RX ADMIN — INSULIN GLARGINE 40 UNITS: 100 INJECTION, SOLUTION SUBCUTANEOUS at 08:52

## 2020-02-09 RX ADMIN — MELATONIN 2 TABLET: at 08:51

## 2020-02-09 RX ADMIN — OXYCODONE AND ACETAMINOPHEN 1 TABLET: 5; 325 TABLET ORAL at 21:23

## 2020-02-09 RX ADMIN — METOPROLOL TARTRATE 6.25 MG: 25 TABLET ORAL at 08:50

## 2020-02-09 RX ADMIN — POLYMYXIN B SULFATE, BACITRACIN ZINC, NEOMYCIN SULFATE: 5000; 3.5; 4 OINTMENT TOPICAL at 18:00

## 2020-02-09 RX ADMIN — TERAZOSIN HYDROCHLORIDE 10 MG: 5 CAPSULE ORAL at 21:28

## 2020-02-09 RX ADMIN — BUMETANIDE 3 MG: 0.25 INJECTION INTRAMUSCULAR; INTRAVENOUS at 21:33

## 2020-02-09 RX ADMIN — POTASSIUM BICARBONATE 40 MEQ: 782 TABLET, EFFERVESCENT ORAL at 12:46

## 2020-02-09 RX ADMIN — Medication 10 ML: at 05:03

## 2020-02-09 RX ADMIN — OXYCODONE AND ACETAMINOPHEN 1 TABLET: 5; 325 TABLET ORAL at 08:50

## 2020-02-09 RX ADMIN — BUMETANIDE 3 MG: 0.25 INJECTION INTRAMUSCULAR; INTRAVENOUS at 15:29

## 2020-02-09 RX ADMIN — OXYCODONE AND ACETAMINOPHEN 1 TABLET: 5; 325 TABLET ORAL at 13:01

## 2020-02-09 NOTE — PROGRESS NOTES
CM was alerted that the patient and his wife are interested in having a hospice information session. CM sent a referral to [facility] to provide more information to the patient and his wife. CM will continue to assist with dispo needs.      Prabhakar Pierce 169, R Nikkie Saleh 75

## 2020-02-09 NOTE — PROGRESS NOTES
Following message sent to hospitalist on call via Telemed Request:    Can pt have order for Saline nasal spray? Pt has been on continuous oxygen since admission, he does have humidifier hooked to oxygen, but still has dried crusted blood in nose. Pt was admitted on 2/6 for respiratory failure with hypoxia related to HF and pulm edema. 07:25  Bedside shift change report given to Riccardo Hurtado RN (oncoming nurse) by Mike Benjamin RN (offgoing nurse). Report included the following information SBAR, Kardex, Intake/Output, Recent Results and Cardiac Rhythm afib. Pt has been awake and alert almost the entire night. Pt voiced desire to transition to hospice care. He stated \" I'm not doing anybody any good just lying around here. I didn't think I was going to have to suffer. Anthony Hoffmann RN

## 2020-02-09 NOTE — PROGRESS NOTES
ADULT PROTOCOL: JET AEROSOL  REASSESSMENT    Patient  [address]     75 y.o.   male     2/8/2020  10:43 PM    Breath Sounds Pre Procedure: Right Breath Sounds: Diminished                               Left Breath Sounds: Diminished    Breath Sounds Post Procedure: Right Breath Sounds: Diminished                                 Left Breath Sounds: Diminished    Breathing pattern: Pre procedure Breathing Pattern: Regular          Post procedure Breathing Pattern: Regular    Heart Rate: Pre procedure Pulse: 70           Post procedure Pulse: 75    Resp Rate: Pre procedure Respirations: 18           Post procedure Respirations: 20    Cough: Pre procedure Cough: Non-productive               Post procedure Cough: Non-productive    Oxygen: O2 Device: Nasal cannula  4LPM     Changed: NO    SpO2: Pre procedure SpO2: 95 %   WITH oxygen              Post procedure SpO2: 97 %  WITH oxygen    Nebulizer Therapy: Current medications Aerosolized Medications: DuoNeb      Changed:  NO    Problem List:   Patient Active Problem List   Diagnosis Code    Renal cell carcinoma (HCC) C64.9    Paroxysmal A-fib (HCC) I48.0    Hematuria R31.9    Acute on chronic respiratory failure with hypoxia (HCC) J96.21    Anasarca R60.1    Acute on chronic diastolic heart failure (HCC) I50.33    Chronic pain G89.29    HTN (hypertension) I10    Type 2 diabetes mellitus (HCC) E11.9    CAD (coronary artery disease) I25.10    BPH (benign prostatic hyperplasia) N40.0    Hyperlipidemia E78.5    Gout M10.9       Respiratory Therapist: Marlo Mukherjee

## 2020-02-09 NOTE — PROGRESS NOTES
11:42 AM  Wife at bedside, she will bring home medicine cabozantinib rx with her tomorrow    12:52 PM  Chronic back pain relieved by percocet however only able to receive q6hr prn -- pain returns sooner than when this is due -- OK'd by MD to change frequency to q4hr prn.

## 2020-02-09 NOTE — HOSPICE
[address] [facility] Help to Those in Need  [phone]     Patient Name: Binh [facility]  YOB: 1945  Age: 75 y.o. [facility] MARJ RN Note:  Hospice consult received, reviewing chart. Will follow up with Unit Nurse and Care Manager to discuss plan of care, patient status and discharge disposition within the hour. 12:44:  Met with patient and his wife at bedside. Patient state that he wants to die. He is now leaning toward changing his code status to DNR but would like to wait until Monday to discuss further with Palliative . Patient wants to transfer to the [facility] and receive Palliative care there. Wife states that she is not able to care for patient at home alone and there is not available family to help. Patient states that he has 100% disability benefits through the [place]. The question remains if the [place] would be a payor source for supplemental care givers in the home? We will follow up with CM and Palliative team tomorrow regarding next steps. Thank you for the opportunity to be of service to this patient.

## 2020-02-10 ENCOUNTER — PATIENT OUTREACH (OUTPATIENT)
Dept: CASE MANAGEMENT | Age: 75
End: 2020-02-10

## 2020-02-10 LAB
ALBUMIN SERPL ELPH-MCNC: 2.6 G/DL (ref 2.9–4.4)
ALBUMIN SERPL-MCNC: 2.4 G/DL (ref 3.5–5)
ALBUMIN/GLOB SERPL: 1.1 {RATIO} (ref 0.7–1.7)
ALPHA1 GLOB SERPL ELPH-MCNC: 0.3 G/DL (ref 0–0.4)
ALPHA2 GLOB SERPL ELPH-MCNC: 0.9 G/DL (ref 0.4–1)
ANION GAP SERPL CALC-SCNC: 6 MMOL/L (ref 5–15)
B-GLOBULIN SERPL ELPH-MCNC: 0.9 G/DL (ref 0.7–1.3)
BACTERIA SPEC CULT: NORMAL
BUN SERPL-MCNC: 70 MG/DL (ref 6–20)
BUN/CREAT SERPL: 43 (ref 12–20)
CALCIUM SERPL-MCNC: 8.9 MG/DL (ref 8.5–10.1)
CHLORIDE SERPL-SCNC: 95 MMOL/L (ref 97–108)
CO2 SERPL-SCNC: 34 MMOL/L (ref 21–32)
CREAT SERPL-MCNC: 1.64 MG/DL (ref 0.7–1.3)
GAMMA GLOB SERPL ELPH-MCNC: 0.4 G/DL (ref 0.4–1.8)
GLOBULIN SER-MCNC: 2.5 G/DL (ref 2.2–3.9)
GLUCOSE BLD STRIP.AUTO-MCNC: 266 MG/DL (ref 65–100)
GLUCOSE BLD STRIP.AUTO-MCNC: 270 MG/DL (ref 65–100)
GLUCOSE BLD STRIP.AUTO-MCNC: 326 MG/DL (ref 65–100)
GLUCOSE BLD STRIP.AUTO-MCNC: 339 MG/DL (ref 65–100)
GLUCOSE SERPL-MCNC: 234 MG/DL (ref 65–100)
HCT VFR BLD AUTO: 27.3 % (ref 36.6–50.3)
HCT VFR BLD AUTO: 28 % (ref 36.6–50.3)
HGB BLD-MCNC: 8.4 G/DL (ref 12.1–17)
HGB BLD-MCNC: 8.6 G/DL (ref 12.1–17)
IGA SERPL-MCNC: 157 MG/DL (ref 61–437)
IGG SERPL-MCNC: 515 MG/DL (ref 700–1600)
IGM SERPL-MCNC: 48 MG/DL (ref 15–143)
INTERPRETATION SERPL IEP-IMP: ABNORMAL
KAPPA LC FREE SER-MCNC: 21.2 MG/L (ref 3.3–19.4)
KAPPA LC FREE/LAMBDA FREE SER: 0.88 {RATIO} (ref 0.26–1.65)
LAMBDA LC FREE SERPL-MCNC: 24.1 MG/L (ref 5.7–26.3)
M PROTEIN SERPL ELPH-MCNC: ABNORMAL G/DL
PHOSPHATE SERPL-MCNC: 2.8 MG/DL (ref 2.6–4.7)
POTASSIUM SERPL-SCNC: 3.5 MMOL/L (ref 3.5–5.1)
PROT SERPL-MCNC: 5.1 G/DL (ref 6–8.5)
SERVICE CMNT-IMP: ABNORMAL
SERVICE CMNT-IMP: NORMAL
SODIUM SERPL-SCNC: 135 MMOL/L (ref 136–145)

## 2020-02-10 PROCEDURE — 74011000250 HC RX REV CODE- 250: Performed by: INTERNAL MEDICINE

## 2020-02-10 PROCEDURE — 74011250637 HC RX REV CODE- 250/637: Performed by: INTERNAL MEDICINE

## 2020-02-10 PROCEDURE — 77010033678 HC OXYGEN DAILY

## 2020-02-10 PROCEDURE — 74011636637 HC RX REV CODE- 636/637: Performed by: INTERNAL MEDICINE

## 2020-02-10 PROCEDURE — 65660000000 HC RM CCU STEPDOWN

## 2020-02-10 PROCEDURE — 80069 RENAL FUNCTION PANEL: CPT

## 2020-02-10 PROCEDURE — 94760 N-INVAS EAR/PLS OXIMETRY 1: CPT

## 2020-02-10 PROCEDURE — 94640 AIRWAY INHALATION TREATMENT: CPT

## 2020-02-10 PROCEDURE — 82962 GLUCOSE BLOOD TEST: CPT

## 2020-02-10 PROCEDURE — 85018 HEMOGLOBIN: CPT

## 2020-02-10 PROCEDURE — 36415 COLL VENOUS BLD VENIPUNCTURE: CPT

## 2020-02-10 PROCEDURE — 74011250637 HC RX REV CODE- 250/637: Performed by: FAMILY MEDICINE

## 2020-02-10 RX ORDER — POLYETHYLENE GLYCOL 3350 17 G/17G
17 POWDER, FOR SOLUTION ORAL
Status: DISCONTINUED | OUTPATIENT
Start: 2020-02-10 | End: 2020-02-18 | Stop reason: HOSPADM

## 2020-02-10 RX ORDER — AMOXICILLIN 250 MG
1 CAPSULE ORAL
Status: DISCONTINUED | OUTPATIENT
Start: 2020-02-10 | End: 2020-02-18 | Stop reason: HOSPADM

## 2020-02-10 RX ORDER — METOLAZONE 2.5 MG/1
5 TABLET ORAL ONCE
Status: COMPLETED | OUTPATIENT
Start: 2020-02-10 | End: 2020-02-10

## 2020-02-10 RX ORDER — METOLAZONE 5 MG/1
5 TABLET ORAL ONCE
Status: COMPLETED | OUTPATIENT
Start: 2020-02-13 | End: 2020-02-13

## 2020-02-10 RX ADMIN — Medication 10 ML: at 13:49

## 2020-02-10 RX ADMIN — IPRATROPIUM BROMIDE AND ALBUTEROL SULFATE 3 ML: .5; 3 SOLUTION RESPIRATORY (INHALATION) at 13:17

## 2020-02-10 RX ADMIN — INSULIN LISPRO 7 UNITS: 100 INJECTION, SOLUTION INTRAVENOUS; SUBCUTANEOUS at 08:14

## 2020-02-10 RX ADMIN — OXYCODONE AND ACETAMINOPHEN 1 TABLET: 5; 325 TABLET ORAL at 10:45

## 2020-02-10 RX ADMIN — INSULIN LISPRO 5 UNITS: 100 INJECTION, SOLUTION INTRAVENOUS; SUBCUTANEOUS at 17:02

## 2020-02-10 RX ADMIN — APIXABAN 5 MG: 5 TABLET, FILM COATED ORAL at 21:33

## 2020-02-10 RX ADMIN — POLYMYXIN B SULFATE, BACITRACIN ZINC, NEOMYCIN SULFATE: 5000; 3.5; 4 OINTMENT TOPICAL at 08:15

## 2020-02-10 RX ADMIN — OXYCODONE AND ACETAMINOPHEN 1 TABLET: 5; 325 TABLET ORAL at 02:26

## 2020-02-10 RX ADMIN — IPRATROPIUM BROMIDE AND ALBUTEROL SULFATE 3 ML: .5; 3 SOLUTION RESPIRATORY (INHALATION) at 02:43

## 2020-02-10 RX ADMIN — INSULIN GLARGINE 40 UNITS: 100 INJECTION, SOLUTION SUBCUTANEOUS at 08:15

## 2020-02-10 RX ADMIN — MELATONIN 2 TABLET: at 08:14

## 2020-02-10 RX ADMIN — Medication 10 ML: at 21:43

## 2020-02-10 RX ADMIN — TERAZOSIN HYDROCHLORIDE 10 MG: 5 CAPSULE ORAL at 21:32

## 2020-02-10 RX ADMIN — IPRATROPIUM BROMIDE AND ALBUTEROL SULFATE 3 ML: .5; 3 SOLUTION RESPIRATORY (INHALATION) at 08:17

## 2020-02-10 RX ADMIN — INSULIN LISPRO 5 UNITS: 100 INJECTION, SOLUTION INTRAVENOUS; SUBCUTANEOUS at 12:23

## 2020-02-10 RX ADMIN — FOLIC ACID 2 MG: 1 TABLET ORAL at 06:25

## 2020-02-10 RX ADMIN — POLYMYXIN B SULFATE, BACITRACIN ZINC, NEOMYCIN SULFATE: 5000; 3.5; 4 OINTMENT TOPICAL at 17:18

## 2020-02-10 RX ADMIN — FEBUXOSTAT 80 MG: 40 TABLET ORAL at 08:19

## 2020-02-10 RX ADMIN — METOLAZONE 5 MG: 2.5 TABLET ORAL at 13:44

## 2020-02-10 RX ADMIN — FINASTERIDE 5 MG: 5 TABLET, FILM COATED ORAL at 21:33

## 2020-02-10 RX ADMIN — OXYCODONE AND ACETAMINOPHEN 1 TABLET: 5; 325 TABLET ORAL at 21:33

## 2020-02-10 RX ADMIN — DOCUSATE SODIUM - SENNOSIDES 1 TABLET: 50; 8.6 TABLET, FILM COATED ORAL at 21:33

## 2020-02-10 RX ADMIN — ATORVASTATIN CALCIUM 40 MG: 40 TABLET, FILM COATED ORAL at 21:33

## 2020-02-10 RX ADMIN — CYANOCOBALAMIN TAB 500 MCG 1000 MCG: 500 TAB at 17:14

## 2020-02-10 RX ADMIN — CYANOCOBALAMIN TAB 500 MCG 1000 MCG: 500 TAB at 08:14

## 2020-02-10 RX ADMIN — INSULIN LISPRO 5 UNITS: 100 INJECTION, SOLUTION INTRAVENOUS; SUBCUTANEOUS at 08:14

## 2020-02-10 RX ADMIN — ACETAMINOPHEN 650 MG: 325 TABLET ORAL at 14:58

## 2020-02-10 RX ADMIN — METOPROLOL TARTRATE 6.25 MG: 25 TABLET ORAL at 08:14

## 2020-02-10 RX ADMIN — BUMETANIDE 3 MG: 0.25 INJECTION INTRAMUSCULAR; INTRAVENOUS at 21:09

## 2020-02-10 RX ADMIN — PANTOPRAZOLE SODIUM 40 MG: 40 TABLET, DELAYED RELEASE ORAL at 06:25

## 2020-02-10 RX ADMIN — BUMETANIDE 3 MG: 0.25 INJECTION INTRAMUSCULAR; INTRAVENOUS at 06:31

## 2020-02-10 RX ADMIN — IPRATROPIUM BROMIDE AND ALBUTEROL SULFATE 3 ML: .5; 3 SOLUTION RESPIRATORY (INHALATION) at 21:05

## 2020-02-10 RX ADMIN — METOPROLOL TARTRATE 6.25 MG: 25 TABLET ORAL at 17:14

## 2020-02-10 RX ADMIN — Medication 10 ML: at 06:35

## 2020-02-10 RX ADMIN — INSULIN LISPRO 7 UNITS: 100 INJECTION, SOLUTION INTRAVENOUS; SUBCUTANEOUS at 12:23

## 2020-02-10 RX ADMIN — APIXABAN 5 MG: 5 TABLET, FILM COATED ORAL at 08:14

## 2020-02-10 RX ADMIN — INSULIN LISPRO 3 UNITS: 100 INJECTION, SOLUTION INTRAVENOUS; SUBCUTANEOUS at 21:10

## 2020-02-10 RX ADMIN — BUMETANIDE 3 MG: 0.25 INJECTION INTRAMUSCULAR; INTRAVENOUS at 13:44

## 2020-02-10 RX ADMIN — OXYCODONE AND ACETAMINOPHEN 1 TABLET: 5; 325 TABLET ORAL at 06:26

## 2020-02-10 RX ADMIN — PYRIDOXINE HCL TAB 50 MG 100 MG: 50 TAB at 08:20

## 2020-02-10 NOTE — PROGRESS NOTES
PIA PLAN-- Transfer to [facility] and spoke with Supirya in the flow department at the [place] and informed her the medicals and request to transfer form was faxed to them. She states she will look for it since they have a lot of faxes that come thru . Judit Flaherty RN BSN CRM        [phone]

## 2020-02-10 NOTE — PROGRESS NOTES
1900 - Bedside and Verbal shift change report given to Dilma Rodriguez RN (oncoming nurse) by Corey Conde RN (offgoing nurse). Report included the following information SBAR, Kardex, Intake/Output, MAR, Recent Results and Cardiac Rhythm Afib/NSR.     2100 - increased pt O2 as pt having difficulty loosening secretions and increased coughing. O2 sat decreased to 87-88% with no recovery. Increased O2 from 3L to 5L. O2 now 96%. Will wean when pt able to excrete secretions. Will continue to monitor. 2120 - Pt O2 down to 4L and maintaining O2 sat >92%. Will continue to monitor. 2140 - Administered percocet per order for pain level 4/10 in back. Will continue to monitor. 0040 - q8h H/H sent with AM labs. Awaiting results. 0236 - Paged RT for prn duoneb, pt is audibly wheezing, O2 sat 92%. Pt administered percocet per order for 4/10 pain in back. Will continue to monitor. 80 - Respiratory at bedside. 2140 - Percocet administered to pt for 4/10 back pain. Will continue to monitor. 0700 - Shift report given to Shelly Davies RN.

## 2020-02-10 NOTE — PROGRESS NOTES
Hospitalist Progress Note    NAME: Gwendolyn Bernstein   :  1945   MRN:  [id number]       Assessment / Plan:  Acute on chronic respiratory failure with hypoxia, baseline 2L   Anasarca  Brenden with CKD3  Due to Acute on chronic diastolic heart failure  PRN BiPAP  Given 1mg IV Bumex in ED without diuresis, s/p 2mg IV BID,increased to bumex 3mg q8h per nephro . Also ordered  Diuril 500mg IV once   Receive a dose of metalozone 5mg PO on   Monitor I/Os, daily weight and lytes  Appreciate Nephrology input and management   UA negative for proteinuria   Renal US showed :  1. Left kidney is within normal limits. 2. Patient is status post right nephrectomy. In the right nephrectomy bed, there  is a 4.5 cm nonspecific hypoechoic mass. 3. Urinary bladder is unremarkable.   -UO 3 L in the last 24 hr  -breathing better now  -Cr stable from   -electrolytes are stable too      -UO 1800 CC IN THE LAST 24 HR  - breathing cont to improve but not back to his baseline yet, as per patient   - Cr improving today 2.4 from 2.6 yesterday  -on 3 L NC  - he would like to talk to hospice care    2/10  Patient changed his CODE STATUS to DNR   We will continue diuresis as per nephrology recommendation and switch to torsemide 60 mg twice daily which is home dose and give metolazone 5 mg twice weekly.   Nephrology input is appreciated   Urine output in the last 24 hours 1775 cc  -Patient now is waiting bed to be transferred to [facility]   Penile lesion   uro consulted , persistent hematuria ,had intermittent bladder irrigation and davis changed on   Hb stable , monitor H&H q8h as pt on eliquis       -resume Eliquis 5 mg BID as per Urology recommendations  - no further hematuria       -Urine is still clear without obvious blood   -Hbg 8.1 from 8.4 yesterday, will cont to monitor now and if cont to drop then we will consider holding AC again      Hypervolemic Hypernatremia  -secondary to CHF  -cont Bumix to treat the hypervolemia   - f/u BMP daily    2/9  -Hyponatremia improved Na 137 today from 133 yesterday     B/l leg redness without warmth and tenderness  Monitor leg swelling as risk for cellulitis, low threshold to start abx     Renal cell carcinoma s/p R Nephrectomy     Chronic A-fib, rate controlled  HTN  Mild bradycardia, doesn't remember the exact dose for metoprolol and claims meds has been adjusted recently  metoprolol dose 12.5mg bid ( decreased from home dose) with holding parameters     2/8  -resume Eliquis 5 mg BID as per Urology recommendations    2/9  -Hbg 8.1 from 8.4 yesterday, will cont to monitor now and if cont to drop then we will consider holding AC again    Chronic pain   Continue Percocet     Type 2 diabetes mellitus   lantus to 40 units, increased from home dose due to hyperglycemia  lipsro 5units TIDAC   Correction scale      CAD (coronary artery disease)     BPH (benign prostatic hyperplasia)  Continue Hytrin and Finesteride     Hyperlipidemia   Continue statins     Gout   Continue Uloric    Morbid obesity      Code Status:  DNR  Surrogate Decision Maker: Wife     DVT Prophylaxis: Eliquis  GI Prophylaxis: not indicated     Baseline: functional      40 or above Morbid obesity / Body mass index is 50.52 kg/m². Subjective:     Chief Complaint / Reason for Physician Visit  FU CHF/hematuria/hyponatremia . Still with anasarca and SOB , requiring 3 liter of oxygen      Discussed with RN events overnight. Wife and daughter at bedside, all questions were answered. He reported that he is okay today.         Review of Systems:  Symptom Y/N Comments  Symptom Y/N Comments   Fever/Chills    Chest Pain n    Poor Appetite    Edema y    Cough    Abdominal Pain n    Sputum    Joint Pain     SOB/LOMBARDI y improving   Pruritis/Rash     Nausea/vomit n   Tolerating PT/OT     Diarrhea    Tolerating Diet y    Constipation    Other             Objective:     VITALS:   Last 24hrs VS reviewed since prior progress note. Most recent are:  Patient Vitals for the past 24 hrs:   Temp Pulse Resp BP SpO2   02/10/20 1026 97.5 °F (36.4 °C) 65 25 140/73 95 %   02/10/20 0739 97 °F (36.1 °C) 74 18 130/41 95 %   02/10/20 0631  80  147/47    02/10/20 0243     95 %   02/10/20 0232 97.9 °F (36.6 °C) 73 16 129/59 91 %   02/09/20 2307 98.1 °F (36.7 °C) 75 14 122/45 91 %   02/09/20 2120     94 %   02/09/20 2113  81 16 154/57 90 %   02/09/20 1928 98.3 °F (36.8 °C) 78 20 135/53 92 %   02/09/20 1921     92 %   02/09/20 1531 98.2 °F (36.8 °C) 77 17 117/53 91 %       Intake/Output Summary (Last 24 hours) at 2/10/2020 1447  Last data filed at 2/10/2020 1200  Gross per 24 hour   Intake 840 ml   Output 1375 ml   Net -535 ml        PHYSICAL EXAM:  General: WD, WN. Alert, cooperative, no acute distress    EENT:  EOMI. Anicteric sclerae. MMM  Resp:  Diminished breath sounds b/l  , no wheezing or rales. No accessory muscle use  CV:  Regular  rhythm, LE  Edema 3+ , anasarca   GI:  Soft, Non distended, Non tender.  +Bowel sounds  Neurologic:  Alert and oriented X 3, normal speech,   Psych:   Good insight. Not anxious nor agitated  Skin:  No rashes. No jaundice, petichae on b/l legs   Penile lesion   : swollen testicles and hematuria in davis bag     Reviewed most current lab test results and cultures  YES  Reviewed most current radiology test results   YES  Review and summation of old records today    NO  Reviewed patient's current orders and MAR    YES  PMH/SH reviewed - no change compared to H&P  ________________________________________________________________________  Care Plan discussed with:    Comments   Patient x    Family  X    RN x    Care Manager x    Consultant                       x Multidiciplinary team rounds were held today with , nursing, pharmacist and clinical coordinator. Patient's plan of care was discussed; medications were reviewed and discharge planning was addressed. ________________________________________________________________________  Total NON critical care TIME:  35  Minutes    Total CRITICAL CARE TIME Spent:   Minutes non procedure based      Comments   >50% of visit spent in counseling and coordination of care x    ________________________________________________________________________  Heather Strickland MD     Procedures: see electronic medical records for all procedures/Xrays and details which were not copied into this note but were reviewed prior to creation of Plan. LABS:  I reviewed today's most current labs and imaging studies.   Pertinent labs include:  Recent Labs     02/10/20  0035 02/09/20 0949 02/09/20 0258 02/08/20 0044   WBC  --   --  9.1 11.5*   HGB 8.4* 8.6* 8.1* 8.4*   HCT 27.3* 28.0* 26.6* 27.4*   PLT  --   --  205 209     Recent Labs     02/10/20  0035 02/09/20 0258 02/08/20 0044   * 137 133*   K 3.5 3.8 4.2   CL 95* 96* 95*   CO2 34* 33* 31   * 217* 224*   BUN 70* 69* 66*   CREA 1.64* 1.71* 1.81*   CA 8.9 8.8 8.5   PHOS 2.8 3.3 2.8   ALB 2.4* 2.4* 2.6*   TBILI  --  0.9 0.7   SGOT  --  24 22   ALT  --  20 20       Signed: Heather Strickland MD

## 2020-02-10 NOTE — PROGRESS NOTES
Patient: Jessica Parker MRN: [id number]  SSN: [id number]    YOB: 1945  Age: 75 y.o. Sex: male        ADMITTED: 2020 to Susie Serrato MD by Thuy Booker MD for Acute on chronic respiratory failure with hypoxia [facility]) [J96.21]      Jessica Parker has an indwelling catheter in place, draining clear/yellow urine, hematuria has resolved. 24 hr urine=1775 mL. Penile and scrotal edema, no change. Penile wound appears to be improving. T 97. Hgb 8.4. BUN 70/Cr 1.64. Vitals: Temp (24hrs), Av.9 °F (36.6 °C), Min:97 °F (36.1 °C), Max:98.3 °F (36.8 °C)    Blood pressure 130/41, pulse 74, temperature 97 °F (36.1 °C), resp. rate 18, height 5' 11\" (1.803 m), weight (!) 164.3 kg (362 lb 3.5 oz), SpO2 95 %. Intake and Output:   1901 - 02/10 0700  In: 1860 [P.O.:1860]  Out: 2525 [Urine:2525]  No intake/output data recorded. VANESSA Output lats 24 hrs: No data found. VANESSA Output last 8 hrs: No data found. BM over last 24 hrs: No data found.     Exam:   Appearance: Well-developed no acute distress  Conjunctiva: Conjunctiva and lids normal  External ears/nose: Normal no lesions or deformities  Hearing: Grossly intact  Respiratory effort: Labored breathing, on O2  Abdomen/flank: Soft, nontender  Skin inspection: Warm and dry  Judgment/Insight: intact  Mood/Affect: normal    Labs:  CBC:   Lab Results   Component Value Date/Time    WBC 9.1 2020 02:58 AM    HCT 27.3 (L) 02/10/2020 12:35 AM    PLATELET 683  02:58 AM     BMP:   Lab Results   Component Value Date/Time    Glucose 234 (H) 02/10/2020 12:35 AM    Sodium 135 (L) 02/10/2020 12:35 AM    Potassium 3.5 02/10/2020 12:35 AM    Chloride 95 (L) 02/10/2020 12:35 AM    CO2 34 (H) 02/10/2020 12:35 AM    BUN 70 (H) 02/10/2020 12:35 AM    Creatinine 1.64 (H) 02/10/2020 12:35 AM    Calcium 8.9 02/10/2020 12:35 AM     Cultures: BCX NGTD    Imaging: CT abd/pelvis   2020  IMPRESSION:  Cystitis, new  Findings compatible with third spacing including bilateral pleural effusions,  trace ascites, and skin edema  No change in coronary artery calcification and cardiomegaly  Stable left pelvic sidewall node  Stable osseous metastases  Assessment/Plan:     · Gross hematuria, resolved     -Manually irrigate catheter PRN.    · Penile laceration, d/t penile swelling     -Wound care consulted. Wound appears to be healing     · Scrotal swelling     -Elevate scrotum w/ towel    Urology signing off, re consult as needed.          Signed By: Nate Vizcarra NP - February 10, 2020

## 2020-02-10 NOTE — PROGRESS NOTES
Nephrology Progress Note  [facility]  [email or web address]                  Phone - [phone]       Patient: Avi Moss   Date- 2/10/2020        Admit Date: 2/5/2020  YOB: 1945             CC: Follow up for jorge           Subjective: Interval History:   -  Cr. Improved  Sob improved  Urine out put 1775  bp stable      As per wife he hasn't been active for last few months. He was in hospital then he was sent to rehab in [place]. He was discharged from rehab on feb 4th. He came to er on feb 5th. He has been on chemo for metastatic renal cancer for last 5 years. He stopped his chemo on his own last year  He has blood clot in urine. ROS:- as above   Assessment & Plan: JORGE   CKD 3 - likely due to DM and HTN BL.CR. 1.6 IN DEC. 2019  Edema/ chf  H/o right nephrectomy  Hematuria, penile injury/ laceration  afib  DM 2  Metastatic renal ca- lung mets and bone mets. Patient stopped his chemo on his own last year. Anemia  Resp. Failure  Hyponatremia likely due to CHF  H/o renal cancer         PLAN-  -continue diuretics  Okay to switch to torsemide 60 mg bid ( home dose )  Give metolazone 5 mg twice a week    WE WILL SIGN OFF. CALL US IF NEEDED.    [facility]       Physical exam:   GEN:  NAD  NECK:  Supple, no thyromegaly  RESP: CTA  b/l, no  wheezing,   CVS: RRR,S1,S2   ABDO:  soft , non tender, No mass  NEURO: non focal, normal speech  EXT: Edema +nt     Guzman +  Care Plan discussed with: patient, wife, nurse  Objective:   Visit Vitals  /73 (BP 1 Location: Right arm, BP Patient Position: At rest)   Pulse 65   Temp 97.5 °F (36.4 °C)   Resp 25   Ht 5' 11\" (1.803 m)   Wt (!) 164.3 kg (362 lb 3.5 oz)   SpO2 95%   BMI 50.52 kg/m²     Last 3 Recorded Weights in this Encounter    02/08/20 0511 02/09/20 0342 02/10/20 0813   Weight: (!) 164.6 kg (362 lb 14 oz) (!) 164.2 kg (361 lb 15.9 oz) (!) 164.3 kg (362 lb 3.5 oz)     02/08 1901 - 02/10 0700  In: 1860 [P.O.:1860]  Out: 4770 [Urine:2525]    Intake/Output Summary (Last 24 hours) at 2/10/2020 1235  Last data filed at 2/10/2020 0850  Gross per 24 hour   Intake 960 ml   Output 975 ml   Net -15 ml      Chart reviewed. Pertinent Notes reviewed.        Medication list  reviewed   Current Facility-Administered Medications   Medication    sodium chloride (OCEAN) 0.65 % nasal squeeze bottle 2 Spray    oxyCODONE-acetaminophen (PERCOCET) 5-325 mg per tablet 1 Tab    albuterol-ipratropium (DUO-NEB) 2.5 MG-0.5 MG/3 ML    albuterol-ipratropium (DUO-NEB) 2.5 MG-0.5 MG/3 ML    insulin glargine (LANTUS) injection 40 Units    bumetanide (BUMEX) injection 3 mg    hydrALAZINE (APRESOLINE) 20 mg/mL injection 20 mg    cyanocobalamin (VITAMIN B12) tablet 3,498 mcg    folic acid (FOLVITE) tablet 2 mg    pantoprazole (PROTONIX) tablet 40 mg    pyridoxine (vitamin B6) (VITAMIN B-6) tablet 100 mg    cholecalciferol (VITAMIN D3) (1000 Units /25 mcg) tablet 2 Tab    insulin lispro (HUMALOG) injection 5 Units    **Can pt supply own Cabozantinib 40 mg tab (Cabometyx, Cometriq )      metoprolol tartrate (LOPRESSOR) tablet 6.25 mg    sodium chloride (NS) flush 5-40 mL    sodium chloride (NS) flush 5-40 mL    acetaminophen (TYLENOL) tablet 650 mg    ondansetron (ZOFRAN) injection 4 mg    apixaban (ELIQUIS) tablet 5 mg    atorvastatin (LIPITOR) tablet 40 mg    febuxostat (ULORIC) tablet 80 mg    finasteride (PROSCAR) tablet 5 mg    terazosin (HYTRIN) capsule 10 mg    insulin lispro (HUMALOG) injection    glucose chewable tablet 16 g    dextrose (D50W) injection syrg 12.5-25 g    glucagon (GLUCAGEN) injection 1 mg    neomycin-bacitracin-polymyxin (NEOSPORIN) ointment           Data Review :  Recent Labs     02/10/20  0035 02/09/20  0258 02/08/20  0044   * 137 133*   K 3.5 3.8 4.2   CL 95* 96* 95*   CO2 34* 33* 31   BUN 70* 69* 66*   CREA 1.64* 1.71* 1.81*   GLU 234* 217* 224*   CA 8.9 8.8 8.5   PHOS 2.8 3.3 2.8     Recent Labs     02/10/20  0035 02/09/20  0949 02/09/20  0258 02/08/20  0044   WBC  --   --  9.1 11.5*   HGB 8.4* 8.6* 8.1* 8.4*   HCT 27.3* 28.0* 26.6* 27.4*   PLT  --   --  205 209     No results for input(s): FE, TIBC, PSAT, FERR in the last 72 hours. No results for input(s): CPK, CKNDX, TROIQ in the last 72 hours. No lab exists for component: CPKMB  Lab Results   Component Value Date/Time    Color YELLOW/STRAW 02/06/2020 05:28 AM    Appearance CLEAR 02/06/2020 05:28 AM    Specific gravity 1.016 02/06/2020 05:28 AM    pH (UA) 5.5 02/06/2020 05:28 AM    Protein TRACE (A) 02/06/2020 05:28 AM    Glucose NEGATIVE  02/06/2020 05:28 AM    Ketone NEGATIVE  02/06/2020 05:28 AM    Bilirubin NEGATIVE  02/06/2020 05:28 AM    Urobilinogen 1.0 02/06/2020 05:28 AM    Nitrites NEGATIVE  02/06/2020 05:28 AM    Leukocyte Esterase NEGATIVE  02/06/2020 05:28 AM    Epithelial cells FEW 02/06/2020 05:28 AM    Bacteria NEGATIVE  02/06/2020 05:28 AM    WBC 0-4 02/06/2020 05:28 AM    RBC  02/06/2020 05:28 AM     Lab Results   Component Value Date/Time    Culture result: NO GROWTH 5 DAYS 02/05/2020 09:06 PM     No results found for: SDES  No results found for: Marbella Chill  Results from Hospital Encounter encounter on 02/05/20   XR CHEST PORT    Narrative INDICATION:  Shortness of breath     EXAM: Chest single view. COMPARISON: 12/1/2019. FINDINGS: A single frontal view of the chest at 2103 hours shows perihilar  parenchymal opacities new since the prior study, with a poor inspiratory  effort. .  The heart, mediastinum and pulmonary vasculature are stable with  cardiomegaly . The bony thorax is unremarkable for age. .      Impression IMPRESSION:  CHF pattern is suspected. Pheobe Hock, MD  [facility]   [email or web address]  [facility]  [address], [address], [address]  Phone - [phone] Fax - [phone]

## 2020-02-10 NOTE — PROGRESS NOTES
0700: Bedside shift change report given to Keyla Shrestha (oncoming nurse) by Suki Yeager RN (offgoing nurse). Report included the following information SBAR, Kardex, Intake/Output and MAR.     0700: Patient in bed, resting quietly. Call bell in reach, will monitor. 0815: Dr. Harper Castillo at bedside- mauricion wants hospice information and to transfer to the [place]. MD will talk with ,.    1042: Palliative care at bedside. Patient still showing interest in hospice care. Patient made a DNR and [facility]>    1235: Dr. Glory De Anda at bedside. Patient still very edematous. Will keep davis. 1429:  working on getting information for transfer to [place] for hospice. 1525: Talked with Dr. Harper Castillo. He discontinued H&H every 8 hours since hematuria has cleared up.     1900: Bedside shift change report given to Suki Yeager RN (oncoming nurse) by Manoj Raya RN (offgoing nurse). Report included the following information SBAR, Kardex, Intake/Output and MAR.

## 2020-02-10 NOTE — PROGRESS NOTES
PIA PLAN--Follow up from the weekend. Spoke with wife and she had come in on Saturday to sign the transfer paperwork for the [place]. Patient more alert and oriented today and voice that he wants to go to the [place]. [facility]  obtained from patient's bedside chart and faxed to the [place] along with medicals . Judit Flaherty RN BSN CRM        [phone]

## 2020-02-10 NOTE — PROGRESS NOTES
Pt and family are moving towards hospice discharge. Await if will be at home with aide coverage or at the [facility] depending on his level of need.

## 2020-02-10 NOTE — PROGRESS NOTES
ADULT PROTOCOL: JET AEROSOL  REASSESSMENT    Patient  [address]     75 y.o.   male     2/9/2020  11:05 PM    Breath Sounds Pre Procedure: Right Breath Sounds: Diminished                               Left Breath Sounds: Diminished    Breath Sounds Post Procedure: Right Breath Sounds: Diminished                                 Left Breath Sounds: Diminished    Breathing pattern: Pre procedure Breathing Pattern: Regular          Post procedure Breathing Pattern: Regular    Heart Rate: Pre procedure Pulse: 75           Post procedure Pulse: 76    Resp Rate: Pre procedure Respirations: 16           Post procedure Respirations: 16    Peak Flow: Pre bronchodilator   n/a          Post bronchodilator   n/a    Incentive Spirometry:   n/a      n/a    Cough: Pre procedure Cough: Non-productive               Post procedure Cough: Non-productive    Sputum: Pre procedure  none                 Post procedure  none    Oxygen: O2 Device: Nasal cannula   Flow rate (L/min) 3     Changed: NO    SpO2: Pre procedure SpO2: 92 %   with oxygen              Post procedure SpO2: 91 %  with oxygen    Nebulizer Therapy: Current medications Aerosolized Medications: DuoNeb      Changed: YES    Problem List:   Patient Active Problem List   Diagnosis Code    Renal cell carcinoma (HCC) C64.9    Paroxysmal A-fib (HCC) I48.0    Hematuria R31.9    Acute on chronic respiratory failure with hypoxia (HCC) J96.21    Anasarca R60.1    Acute on chronic diastolic heart failure (HCC) I50.33    Chronic pain G89.29    HTN (hypertension) I10    Type 2 diabetes mellitus (HCC) E11.9    CAD (coronary artery disease) I25.10    BPH (benign prostatic hyperplasia) N40.0    Hyperlipidemia E78.5    Gout M10.9       Respiratory Therapist: Jenn Tamez, RT

## 2020-02-10 NOTE — PROGRESS NOTES
Palliative Medicine Consult  Jose: [phone] (1921)    Patient Name: Caryle Games  YOB: 1945    Date of Initial Consult: 2/6/2020  Reason for Consult: End Stage Disease  Requesting Provider: Johnathan Christian MD  Primary Care Physician: Brittany Patel MD     SUMMARY:   Caryle Games is a 75 y.o. with a past history of widely metastatic renal cell carcinoma, CAD, DM, HTN, Peripheral neuropathy, and chronic a-fib, who was admitted on 2/5/2020 from home with a diagnosis of Acute on chronic respiratory failure with hypoxia and anasarca. Current medical issues leading to Palliative Medicine involvement include: goals of care discussion in setting of debility, worsening medical conditions and caregiver strain    Mr 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  He has been  to his wife Jose Lopez for 23 years. They have no children, but Jose Lopez has a daughter who lives in [facility] [address]:   1. DNR Discussion  2. Debility  3. Frailty  4. Anorexia  5. Edema  6. Back pain  7. Leg pain       PLAN:   1. Met with Mr José along with Bubba Lowery LCSW  2. He is clear he wants to be a DNR now, but is not ready to sign the DDNR without his wife present. Will come back later to sign while she is here  3. He shared that he wishes he could \"just die\", that he was not prepared for this part of dying, which includes a lot of suffering  4. He also knows that he does not want to go home- he does not want to be a burden for his wife, and he does not want to die in their home  5. He is hoping for a bed on the Palliative Unit at the [place]- where he can get comfort oriented care   6. He thinks he has a bed available at the [place]- but I am not sure if it is a medical bed, or a palliative bed- will ask Case Management to look into this  7. Initial consult note routed to primary continuity provider and/or primary health care team members  8.  Communicated plan of care with: Palliative IDTLauri 192 Team     GOALS OF CARE / TREATMENT PREFERENCES:     GOALS OF CARE:  Patient/Health Care Proxy Stated Goals: Comfort    TREATMENT PREFERENCES:   Code Status: DNR    Advance Care Planning:  [x] [facility] Interdisciplinary Team has updated the ACP Navigator with Health Care Decision Maker and Patient Capacity      Primary Decision Maker: Joshua Mariee - [phone]    Advance Care Planning 2/7/2020   Patient's Healthcare Decision Maker is: Legal Next of Kin   Confirm Advance Directive None   Patient Would Like to Complete Advance Directive No       Medical Interventions: Comfort measures     Other Instructions: Other:    As far as possible, the palliative care team has discussed with patient / health care proxy about goals of care / treatment preferences for patient.      HISTORY:     History obtained from: chart, patient, wife    CHIEF COMPLAINT: none    HPI/SUBJECTIVE:    The patient is:   [x] Verbal and participatory  [] Non-participatory due to:     Patinet is more alert, conversational, seems to be doing well today but feels poorly    Clinical Pain Assessment (nonverbal scale for severity on nonverbal patients):   Clinical Pain Assessment  Severity: 5  Location: legs/back  Character: aching/sharp  Duration: chronic  Factors: pain meds help some  Frequency: constant          Duration: for how long has pt been experiencing pain (e.g., 2 days, 1 month, years)  Frequency: how often pain is an issue (e.g., several times per day, once every few days, constant)     FUNCTIONAL ASSESSMENT:     Palliative Performance Scale (PPS):  PPS: 20       PSYCHOSOCIAL/SPIRITUAL SCREENING:     Palliative IDT has assessed this patient for cultural preferences / practices and a referral made as appropriate to needs (Cultural Services, Patient Advocacy, Ethics, etc.)    Any spiritual / Religion concerns:  [] Yes /  [x] No    Caregiver Burnout:  [] Yes /  [x] No / [] No Caregiver Present      Anticipatory grief assessment:   [x] Normal  / [] Maladaptive       ESAS Anxiety: Anxiety: 0    ESAS Depression: Depression: 2        REVIEW OF SYSTEMS:     Positive and pertinent negative findings in ROS are noted above in HPI. The following systems were [x] reviewed / [] unable to be reviewed as noted in HPI  Other findings are noted below. Systems: constitutional, ears/nose/mouth/throat, respiratory, gastrointestinal, genitourinary, musculoskeletal, integumentary, neurologic, psychiatric, endocrine. Positive findings noted below. Modified ESAS Completed by: provider   Fatigue: 3     Depression: 2 Pain: 5   Anxiety: 0 Nausea: 0   Anorexia: 6 Dyspnea: 0     Constipation: No              PHYSICAL EXAM:     From RN flowsheet:  Wt Readings from Last 3 Encounters:   02/10/20 (!) 362 lb 3.5 oz (164.3 kg)   01/28/14 (!) 375 lb (170.1 kg)     Blood pressure 140/73, pulse 65, temperature 97.5 °F (36.4 °C), resp. rate 25, height 5' 11\" (1.803 m), weight (!) 362 lb 3.5 oz (164.3 kg), SpO2 95 %.     Pain Scale 1: Numeric (0 - 10)  Pain Intensity 1: 0  Pain Onset 1: chronic  Pain Location 1: Back  Pain Orientation 1: Mid  Pain Description 1: Constant  Pain Intervention(s) 1: Medication (see MAR)  Last bowel movement, if known:     Constitutional: alert, obese but appears frail and chronically ill  Eyes: pupils equal, anicteric  ENMT: no nasal discharge, moist mucous membranes  Cardiovascular: very edematous in his legs and feet  Respiratory: breathing slightly labored,   Gastrointestinal: soft non-tender, +bowel sounds  Musculoskeletal: no deformity, no tenderness to palpation  Skin: warm, dry  Neurologic: following commands, moving all extremities  Psychiatric: full affect, no hallucinations  Other:       HISTORY:     Active Problems:    Renal cell carcinoma (HCC) (12/1/2019)      Paroxysmal A-fib ([facility] (12/1/2019)      Acute on chronic respiratory failure with hypoxia ([facility] (2/6/2020) Anasarca (2/6/2020)      Acute on chronic diastolic heart failure (HCC) (2/6/2020)      Chronic pain (2/6/2020)      HTN (hypertension) (2/6/2020)      Type 2 diabetes mellitus ([facility] (2/6/2020)      CAD (coronary artery disease) (2/6/2020)      BPH (benign prostatic hyperplasia) (2/6/2020)      Hyperlipidemia (2/6/2020)      Gout (2/6/2020)      Past Medical History:   Diagnosis Date    CAD (coronary artery disease)     Cancer ([facility]     RENAL     CKD (chronic kidney disease) stage 3, GFR 30-59 ml/min ([facility])     Diabetes ([facility]     TYPE 2    Hypertension     Peripheral neuropathy     Sleep disorder     SLEEP APNEA      Past Surgical History:   Procedure Laterality Date    CARDIAC SURG PROCEDURE UNLIST      HX NEPHROSTOMY      HX ORTHOPAEDIC      2 KNEE REPLACEMENTS    IR STENT PLACEMENT        No family history on file. History reviewed, no pertinent family history.   Social History     Tobacco Use    Smoking status: Never Smoker    Smokeless tobacco: Never Used   Substance Use Topics    Alcohol use: Never     Frequency: Never     Allergies   Allergen Reactions    Allopurinol Rash    Gabapentin Other (comments)     nightmares    Paradione Hives      Current Facility-Administered Medications   Medication Dose Route Frequency    sodium chloride (OCEAN) 0.65 % nasal squeeze bottle 2 Spray  2 Spray Both Nostrils Q2H PRN    oxyCODONE-acetaminophen (PERCOCET) 5-325 mg per tablet 1 Tab  1 Tab Oral Q4H PRN    albuterol-ipratropium (DUO-NEB) 2.5 MG-0.5 MG/3 ML  3 mL Nebulization Q6HWA RT    albuterol-ipratropium (DUO-NEB) 2.5 MG-0.5 MG/3 ML  3 mL Nebulization Q6H PRN    insulin glargine (LANTUS) injection 40 Units  40 Units SubCUTAneous DAILY    bumetanide (BUMEX) injection 3 mg  3 mg IntraVENous Q8H    hydrALAZINE (APRESOLINE) 20 mg/mL injection 20 mg  20 mg IntraVENous Q6H PRN    cyanocobalamin (VITAMIN B12) tablet 1,000 mcg  1,000 mcg Oral BID    folic acid (FOLVITE) tablet 2 mg  2 mg Oral 7am    pantoprazole (PROTONIX) tablet 40 mg  40 mg Oral DAILY    pyridoxine (vitamin B6) (VITAMIN B-6) tablet 100 mg  100 mg Oral DAILY    cholecalciferol (VITAMIN D3) (1000 Units /25 mcg) tablet 2 Tab  2,000 Units Oral DAILY    insulin lispro (HUMALOG) injection 5 Units  5 Units SubCUTAneous TIDAC    **Can pt supply own Cabozantinib 40 mg tab (Cabometyx, Cometriq )    1 Each Other DAILY    metoprolol tartrate (LOPRESSOR) tablet 6.25 mg  6.25 mg Oral BID    sodium chloride (NS) flush 5-40 mL  5-40 mL IntraVENous Q8H    sodium chloride (NS) flush 5-40 mL  5-40 mL IntraVENous PRN    acetaminophen (TYLENOL) tablet 650 mg  650 mg Oral Q6H PRN    ondansetron (ZOFRAN) injection 4 mg  4 mg IntraVENous Q4H PRN    apixaban (ELIQUIS) tablet 5 mg  5 mg Oral Q12H    atorvastatin (LIPITOR) tablet 40 mg  40 mg Oral QHS    febuxostat (ULORIC) tablet 80 mg  80 mg Oral DAILY    finasteride (PROSCAR) tablet 5 mg  5 mg Oral QHS    terazosin (HYTRIN) capsule 10 mg  10 mg Oral QHS    insulin lispro (HUMALOG) injection   SubCUTAneous AC&HS    glucose chewable tablet 16 g  4 Tab Oral PRN    dextrose (D50W) injection syrg 12.5-25 g  12.5-25 g IntraVENous PRN    glucagon (GLUCAGEN) injection 1 mg  1 mg IntraMUSCular PRN    neomycin-bacitracin-polymyxin (NEOSPORIN) ointment   Topical BID          LAB AND IMAGING FINDINGS:     Lab Results   Component Value Date/Time    WBC 9.1 02/09/2020 02:58 AM    HGB 8.4 (L) 02/10/2020 12:35 AM    PLATELET 388 59/95/7575 02:58 AM     Lab Results   Component Value Date/Time    Sodium 135 (L) 02/10/2020 12:35 AM    Potassium 3.5 02/10/2020 12:35 AM    Chloride 95 (L) 02/10/2020 12:35 AM    CO2 34 (H) 02/10/2020 12:35 AM    BUN 70 (H) 02/10/2020 12:35 AM    Creatinine 1.64 (H) 02/10/2020 12:35 AM    Calcium 8.9 02/10/2020 12:35 AM    Magnesium 2.7 (H) 12/05/2019 03:30 AM    Phosphorus 2.8 02/10/2020 12:35 AM      Lab Results   Component Value Date/Time    AST (SGOT) 24 02/09/2020 02:58 AM    Alk. phosphatase 403 (H) 02/09/2020 02:58 AM    Protein, total 5.8 (L) 02/09/2020 02:58 AM    Albumin 2.4 (L) 02/10/2020 12:35 AM    Globulin 3.4 02/09/2020 02:58 AM     No results found for: INR, PTMR, PTP, PT1, PT2, APTT, INREXT, INREXT   No results found for: IRON, FE, TIBC, IBCT, PSAT, FERR   No results found for: PH, PCO2, PO2  No components found for: GLPOC   No results found for: CPK, CKMB             Total time:   Counseling / coordination time, spent as noted above:   > 50% counseling / coordination?:     Prolonged service was provided for  []30 min   []75 min in face to face time in the presence of the patient, spent as noted above. Time Start:   Time End:   Note: this can only be billed with 10498 (initial) or 97723 (follow up). If multiple start / stop times, list each separately.

## 2020-02-10 NOTE — PROGRESS NOTES
Problem: Falls - Risk of  Goal: *Absence of Falls  Description  Document Fletcher [place] Fall Risk and appropriate interventions in the flowsheet.   Outcome: Progressing Towards Goal  Note: Fall Risk Interventions:       Mentation Interventions: Adequate sleep, hydration, pain control, Bed/chair exit alarm, Door open when patient unattended, More frequent rounding, Reorient patient, Room close to nurse's station, Update white board    Medication Interventions: Bed/chair exit alarm    Elimination Interventions: Bed/chair exit alarm, Call light in reach, Patient to call for help with toileting needs, Toileting schedule/hourly rounds

## 2020-02-10 NOTE — PROGRESS NOTES
Palliative Medicine  [place]: [phone])  [facility]: [phone])    Chart reviewed. Palliative team of Jeremiah Britt and this writer met with patient to discuss his goals of care which appear to have changed some over the weekend, based on notes in the chart. [facility]. Patient had also expressed a desire to now be DNR but he wants to wait for his wife to sign the DDNR. Today patient is alert and oriented, he does recognize us and notes that he wants to talk to a  [facility]  [facility]. He notes he does not want to be a burden to his wife who is unable to care for him at home on her own. Patient appears to be focusing more on his comfort now. Patient appears more comfortable than last week, still complaining of some pain issues (more to his back). Palliative team will return later when wife present to address DDNR and be available as needed. Spoke to Christine Rosen, who shared that  is aware of patient's desire to go to [place]. Patient may be eligible to go to their palliative unit if accepted.

## 2020-02-11 LAB
ALBUMIN SERPL-MCNC: 2.4 G/DL (ref 3.5–5)
ANION GAP SERPL CALC-SCNC: 7 MMOL/L (ref 5–15)
BUN SERPL-MCNC: 64 MG/DL (ref 6–20)
BUN/CREAT SERPL: 38 (ref 12–20)
CALCIUM SERPL-MCNC: 8.9 MG/DL (ref 8.5–10.1)
CHLORIDE SERPL-SCNC: 93 MMOL/L (ref 97–108)
CO2 SERPL-SCNC: 34 MMOL/L (ref 21–32)
CREAT SERPL-MCNC: 1.67 MG/DL (ref 0.7–1.3)
GLUCOSE BLD STRIP.AUTO-MCNC: 244 MG/DL (ref 65–100)
GLUCOSE BLD STRIP.AUTO-MCNC: 288 MG/DL (ref 65–100)
GLUCOSE BLD STRIP.AUTO-MCNC: 322 MG/DL (ref 65–100)
GLUCOSE BLD STRIP.AUTO-MCNC: 325 MG/DL (ref 65–100)
GLUCOSE SERPL-MCNC: 217 MG/DL (ref 65–100)
HCT VFR BLD AUTO: 27.4 % (ref 36.6–50.3)
HGB BLD-MCNC: 8.4 G/DL (ref 12.1–17)
PHOSPHATE SERPL-MCNC: 3.4 MG/DL (ref 2.6–4.7)
POTASSIUM SERPL-SCNC: 3.8 MMOL/L (ref 3.5–5.1)
SERVICE CMNT-IMP: ABNORMAL
SODIUM SERPL-SCNC: 134 MMOL/L (ref 136–145)

## 2020-02-11 PROCEDURE — 80069 RENAL FUNCTION PANEL: CPT

## 2020-02-11 PROCEDURE — 74011250637 HC RX REV CODE- 250/637: Performed by: INTERNAL MEDICINE

## 2020-02-11 PROCEDURE — 85018 HEMOGLOBIN: CPT

## 2020-02-11 PROCEDURE — 36415 COLL VENOUS BLD VENIPUNCTURE: CPT

## 2020-02-11 PROCEDURE — 74011000250 HC RX REV CODE- 250: Performed by: INTERNAL MEDICINE

## 2020-02-11 PROCEDURE — 94640 AIRWAY INHALATION TREATMENT: CPT

## 2020-02-11 PROCEDURE — 74011636637 HC RX REV CODE- 636/637: Performed by: INTERNAL MEDICINE

## 2020-02-11 PROCEDURE — 65270000015 HC RM PRIVATE ONCOLOGY

## 2020-02-11 PROCEDURE — 82962 GLUCOSE BLOOD TEST: CPT

## 2020-02-11 PROCEDURE — 74011250637 HC RX REV CODE- 250/637: Performed by: FAMILY MEDICINE

## 2020-02-11 PROCEDURE — 77010033678 HC OXYGEN DAILY

## 2020-02-11 PROCEDURE — 74011250637 HC RX REV CODE- 250/637: Performed by: NURSE PRACTITIONER

## 2020-02-11 RX ORDER — OXYCODONE HYDROCHLORIDE 5 MG/1
5 TABLET ORAL
Status: DISCONTINUED | OUTPATIENT
Start: 2020-02-11 | End: 2020-02-18 | Stop reason: HOSPADM

## 2020-02-11 RX ORDER — GUAIFENESIN 100 MG/5ML
400 SOLUTION ORAL 3 TIMES DAILY
Status: DISCONTINUED | OUTPATIENT
Start: 2020-02-11 | End: 2020-02-16

## 2020-02-11 RX ORDER — ACETAMINOPHEN 500 MG
1000 TABLET ORAL 3 TIMES DAILY
Status: DISCONTINUED | OUTPATIENT
Start: 2020-02-11 | End: 2020-02-18 | Stop reason: HOSPADM

## 2020-02-11 RX ORDER — TORSEMIDE 20 MG/1
60 TABLET ORAL DAILY
Status: DISCONTINUED | OUTPATIENT
Start: 2020-02-11 | End: 2020-02-18 | Stop reason: HOSPADM

## 2020-02-11 RX ADMIN — INSULIN LISPRO 7 UNITS: 100 INJECTION, SOLUTION INTRAVENOUS; SUBCUTANEOUS at 11:57

## 2020-02-11 RX ADMIN — INSULIN LISPRO 2 UNITS: 100 INJECTION, SOLUTION INTRAVENOUS; SUBCUTANEOUS at 21:52

## 2020-02-11 RX ADMIN — INSULIN GLARGINE 40 UNITS: 100 INJECTION, SOLUTION SUBCUTANEOUS at 08:10

## 2020-02-11 RX ADMIN — METOPROLOL TARTRATE 6.25 MG: 25 TABLET ORAL at 08:11

## 2020-02-11 RX ADMIN — IPRATROPIUM BROMIDE AND ALBUTEROL SULFATE 3 ML: .5; 3 SOLUTION RESPIRATORY (INHALATION) at 20:38

## 2020-02-11 RX ADMIN — OXYCODONE 5 MG: 5 TABLET ORAL at 19:33

## 2020-02-11 RX ADMIN — METOPROLOL TARTRATE 6.25 MG: 25 TABLET ORAL at 17:16

## 2020-02-11 RX ADMIN — OXYCODONE AND ACETAMINOPHEN 1 TABLET: 5; 325 TABLET ORAL at 04:09

## 2020-02-11 RX ADMIN — POLYETHYLENE GLYCOL 3350 17 G: 17 POWDER, FOR SOLUTION ORAL at 10:48

## 2020-02-11 RX ADMIN — POLYMYXIN B SULFATE, BACITRACIN ZINC, NEOMYCIN SULFATE: 5000; 3.5; 4 OINTMENT TOPICAL at 08:15

## 2020-02-11 RX ADMIN — INSULIN LISPRO 5 UNITS: 100 INJECTION, SOLUTION INTRAVENOUS; SUBCUTANEOUS at 08:09

## 2020-02-11 RX ADMIN — APIXABAN 5 MG: 5 TABLET, FILM COATED ORAL at 21:51

## 2020-02-11 RX ADMIN — IPRATROPIUM BROMIDE AND ALBUTEROL SULFATE 3 ML: .5; 3 SOLUTION RESPIRATORY (INHALATION) at 13:55

## 2020-02-11 RX ADMIN — INSULIN LISPRO 7 UNITS: 100 INJECTION, SOLUTION INTRAVENOUS; SUBCUTANEOUS at 16:42

## 2020-02-11 RX ADMIN — GUAIFENESIN 400 MG: 200 SOLUTION ORAL at 16:34

## 2020-02-11 RX ADMIN — TERAZOSIN HYDROCHLORIDE 10 MG: 5 CAPSULE ORAL at 22:25

## 2020-02-11 RX ADMIN — Medication 10 ML: at 05:51

## 2020-02-11 RX ADMIN — MELATONIN 2 TABLET: at 08:10

## 2020-02-11 RX ADMIN — CYANOCOBALAMIN TAB 500 MCG 1000 MCG: 500 TAB at 08:10

## 2020-02-11 RX ADMIN — PYRIDOXINE HCL TAB 50 MG 100 MG: 50 TAB at 08:10

## 2020-02-11 RX ADMIN — OXYCODONE 5 MG: 5 TABLET ORAL at 13:36

## 2020-02-11 RX ADMIN — BUMETANIDE 3 MG: 0.25 INJECTION INTRAMUSCULAR; INTRAVENOUS at 05:51

## 2020-02-11 RX ADMIN — PANTOPRAZOLE SODIUM 40 MG: 40 TABLET, DELAYED RELEASE ORAL at 05:52

## 2020-02-11 RX ADMIN — DOCUSATE SODIUM - SENNOSIDES 1 TABLET: 50; 8.6 TABLET, FILM COATED ORAL at 21:52

## 2020-02-11 RX ADMIN — IPRATROPIUM BROMIDE AND ALBUTEROL SULFATE 3 ML: .5; 3 SOLUTION RESPIRATORY (INHALATION) at 08:20

## 2020-02-11 RX ADMIN — APIXABAN 5 MG: 5 TABLET, FILM COATED ORAL at 08:10

## 2020-02-11 RX ADMIN — Medication 10 ML: at 21:53

## 2020-02-11 RX ADMIN — FINASTERIDE 5 MG: 5 TABLET, FILM COATED ORAL at 21:51

## 2020-02-11 RX ADMIN — INSULIN LISPRO 5 UNITS: 100 INJECTION, SOLUTION INTRAVENOUS; SUBCUTANEOUS at 11:57

## 2020-02-11 RX ADMIN — GUAIFENESIN 400 MG: 200 SOLUTION ORAL at 21:52

## 2020-02-11 RX ADMIN — ACETAMINOPHEN 1000 MG: 500 TABLET ORAL at 16:36

## 2020-02-11 RX ADMIN — FOLIC ACID 2 MG: 1 TABLET ORAL at 06:08

## 2020-02-11 RX ADMIN — TORSEMIDE 60 MG: 20 TABLET ORAL at 11:57

## 2020-02-11 RX ADMIN — POLYMYXIN B SULFATE, BACITRACIN ZINC, NEOMYCIN SULFATE: 5000; 3.5; 4 OINTMENT TOPICAL at 17:20

## 2020-02-11 RX ADMIN — INSULIN LISPRO 5 UNITS: 100 INJECTION, SOLUTION INTRAVENOUS; SUBCUTANEOUS at 16:43

## 2020-02-11 RX ADMIN — ACETAMINOPHEN 1000 MG: 500 TABLET ORAL at 21:52

## 2020-02-11 RX ADMIN — FEBUXOSTAT 80 MG: 40 TABLET ORAL at 08:10

## 2020-02-11 NOTE — PROGRESS NOTES
0700: Bedside shift change report given to TARIK Javed (oncoming nurse) by Natividad Abebe RN (offgoing nurse). Report included the following information SBAR, Kardex, Intake/Output and MAR.     0700: Patient in bed, resting quietly. Call bell in reach, will monitor. 0935: Dr. Keegan Bee at bedside. [facility] still [facility] for [facility].     0701: Ofelia Holstein, NP with palliative care, at bedside. Patient is now comfort measures only. Orders placed. 1505: TRANSFER - OUT REPORT:    Verbal report given to [facility] TARIK JOHN(name) on Bee Fong  being transferred to [facility]) for routine progression of care       Report consisted of patients Situation, Background, Assessment and   Recommendations(SBAR). Information from the following report(s) SBAR, Kardex, Intake/Output and MAR was reviewed with the receiving nurse. Lines:   Peripheral IV 02/05/20 Right Antecubital (Active)   Site Assessment Clean, dry, & intact 2/11/2020 12:00 PM   Phlebitis Assessment 0 2/11/2020 12:00 PM   Infiltration Assessment 0 2/11/2020 12:00 PM   Dressing Status Clean, dry, & intact 2/11/2020 12:00 PM   Dressing Type Tape;Transparent 2/11/2020 12:00 PM   Hub Color/Line Status Pink;Capped 2/11/2020 12:00 PM   Alcohol Cap Used Yes 2/11/2020 12:15 AM       Peripheral IV 02/05/20 Right Hand (Active)   Site Assessment Clean, dry, & intact 2/11/2020 12:00 PM   Phlebitis Assessment 0 2/11/2020 12:00 PM   Infiltration Assessment 0 2/11/2020 12:00 PM   Dressing Status Clean, dry, & intact 2/11/2020 12:00 PM   Dressing Type Tape;Transparent 2/11/2020 12:00 PM   Hub Color/Line Status Pink;Capped 2/11/2020 12:00 PM   Alcohol Cap Used Yes 2/11/2020 12:15 AM        Opportunity for questions and clarification was provided. Patient transported with:   O2 @ 3 liters  Registered Nurse  Tech     [phone]: Patient transferred to oncology with RN and PCT.

## 2020-02-11 NOTE — PROGRESS NOTES
PIA PLAN--[facility]. Patient transferred to room 1123. Handoff given to Christal Laughlin CM to follow for dcp. Still awaiting to hear from the [place]. Family updated we are awaiting to hear from the [place]. Judit Flaherty  RN BSN CRM        [phone]

## 2020-02-11 NOTE — PROGRESS NOTES
Problem: Falls - Risk of  Goal: *Absence of Falls  Description  Document Gissel President Fall Risk and appropriate interventions in the flowsheet.   Outcome: Progressing Towards Goal  Note: Fall Risk Interventions:       Mentation Interventions: Bed/chair exit alarm, Adequate sleep, hydration, pain control, Door open when patient unattended, Increase mobility, More frequent rounding, Reorient patient, Update white board, Room close to nurse's station    Medication Interventions: Bed/chair exit alarm, Patient to call before getting OOB    Elimination Interventions: Bed/chair exit alarm, Call light in reach, Patient to call for help with toileting needs, Toileting schedule/hourly rounds

## 2020-02-11 NOTE — PROGRESS NOTES
ADULT PROTOCOL: JET AEROSOL  REASSESSMENT    Patient  [address]     75 y.o.   male     2/11/2020  8:42 AM    Breath Sounds Pre Procedure: Right Breath Sounds: Diminished, Coarse                               Left Breath Sounds: Diminished, Coarse    Breath Sounds Post Procedure: Right Breath Sounds: Diminished, Coarse                                 Left Breath Sounds: Diminished, Coarse    Breathing pattern: Pre procedure Breathing Pattern: Regular          Post procedure Breathing Pattern: Regular    Heart Rate: Pre procedure Pulse: 75           Post procedure Pulse: 75    Resp Rate: Pre procedure Respirations: 22           Post procedure Respirations: 22      Cough: Pre procedure Cough: Non-productive               Post procedure Cough: Non-productive      Oxygen: O2 Device: Nasal cannula   3L    SpO2: Pre procedure SpO2: 94 %                Post procedure SpO2: 94 %      Nebulizer Therapy: Current medications Aerosolized Medications: DuoNeb      Smoking History: Never Smoker     Problem List:   Patient Active Problem List   Diagnosis Code    Renal cell carcinoma (HCC) C64.9    Paroxysmal A-fib (HCC) I48.0    Hematuria R31.9    Acute on chronic respiratory failure with hypoxia (HCC) J96.21    Anasarca R60.1    Acute on chronic diastolic heart failure (HCC) I50.33    Chronic pain G89.29    HTN (hypertension) I10    Type 2 diabetes mellitus (HCC) E11.9    CAD (coronary artery disease) I25.10    BPH (benign prostatic hyperplasia) N40.0    Hyperlipidemia E78.5    Gout M10.9       Respiratory Therapist: Leora Schaumann

## 2020-02-11 NOTE — PROGRESS NOTES
[facility] PLAN-- [facility]iative. Spoke with Colt Benjamin at the [place] and they are reviewing medicals and awaiting hospice decision. Faxed her notes from today to [phone]. Judit Flaherty  RN BSN CRM        [phone]

## 2020-02-11 NOTE — CARDIO/PULMONARY
Cardiac Rehab Note: chart review CHF BUNDLE  
 
EF 56-60%  on 12/2/2019 per echo Smoking history assessed. Patient is a never smoker. Per notes pt leaning toward home Hospice and to be transferred to [address] [place] [address].

## 2020-02-11 NOTE — PROGRESS NOTES
1900 - Bedside and Verbal shift change report given to Ariela Reinoso RN (oncoming nurse) by Shelton Rod RN (offgoing nurse). Report included the following information SBAR, Kardex, Intake/Output, MAR, Recent Results and Cardiac Rhythm Afib/NSR. Pt resting quietly in bed at this time. Call bell within reach. Will continue to monitor. 1930 - VSS. Pt denies any pain at this time. LE swelling 4+ pitting edema with pulses palpable and cap refill >3 seconds, UE 2+ edema with pulses palpable and cap refill <3 seconds. Pt has anasarca. Pt is A/O x4. Will continue to monitor. Heel boots applied to both heels. Pt on continuous lateral rotation. 2105 - Pt has not had a BM since prior to admission. Recalls last BM at rehab. Paged teleMD for orders. 2140 - Pt administered percocet prn per order for 4/10 chronic back pain. Pericolace also administered per order. Will continue to monitor. 2230 - Pt states his pain is at a manageable level at this point. Pain level 2/10. Will continue to monitor. 2320 - Shift report given to [facility], RN.

## 2020-02-11 NOTE — PROGRESS NOTES
Oncology End of Shift Note      Bedside shift change report given to Kiki Beth RN (incoming nurse) by Edi Dumont RN (outgoing nurse) on [facility]. Report included the following information SBAR, Kardex and MAR. Shift Summary:   Patient transferred from PCU at 1600, report received from Devi Flanagan. Patient tolerated care well through out remainder of shift. Family present at beginning of transfer. All scheduled mediations given, no complaints of pain. Hourly rounding completed. Issues for Physician to Address:       Patient on Cardiac Monitoring?     [] Yes  [x] No    Rhythm:          Shift Events        Edi Dumont RN

## 2020-02-11 NOTE — PROGRESS NOTES
Palliative Medicine Consult  [place]: [phone] ([phone])    Patient Name: Bharathi Hairston  YOB: 1945    Date of Initial Consult: 2/6/2020  Reason for Consult: End Stage Disease  Requesting Provider: Mary Napier MD  Primary Care Physician: Brittany Patel MD     SUMMARY:   Bharathi Hairston is a 75 y.o. with a past history of widely metastatic renal cell carcinoma, CAD, DM, HTN, Peripheral neuropathy, and chronic a-fib, who was admitted on 2/5/2020 from home with a diagnosis of Acute on chronic respiratory failure with hypoxia and anasarca. 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  He has been  to his wife Sina Mir for 23 years. They have no children, but Sina Mir has a daughter who lives in [facility] [place] [address]:   1. Cough  2. DNR Discussion  3. Debility  4. Frailty  5. Anorexia  6. Edema  7. Back pain (chronic)  8. Leg pain (Chronic)       PLAN:   1. Met with Mr Lizz Gustafson alone today- he is in much better spirits. He is comfortable with the plan to transfer to the [place] if a bed opens up on their palliative unit. 2. I shared with him that in the meantime we can shift his care here to comfort oriented if this is what he would like (I.e. no more lab draws, focus on his pain, take him off the heart monitor, he can stop taking his chemo meds- which he already has)  3. He agreed to this plan, so switched him to comfort care in the computer and simplified his orders some  4. We talked about his pain- he has been on Oxy/APAP 5/325 for years, but feels it is not helping at the moment. Sometimes it is not controlling his pain, and other times it makes him feel groggy  5.  Agreed to change his tylenol to 1000mg scheduled TID, and make his oxycodone without tylenol and every 4 hours so he can have more consistent pain control with the tylenol without the grogginess, but still has the oxycodone if he needs it  6. His more distressing symptom is his congestion. cough. He feels that he needs to cough up mucous, but it wont come out. He is suctioning his mouth a lot in attempt to clear his throat. Will try Guaifenesin TID for a few days to see if this helps. Considered Robinul but I think it will make him worse. 7. Will follow periodically for support while he waits for transfer to the [place]  8. Initial consult note routed to primary continuity provider and/or primary health care team members  9. Communicated plan of care with: Palliative IDTLauri 192 Team     GOALS OF CARE / TREATMENT PREFERENCES:     GOALS OF CARE:  Patient/Health Care Proxy Stated Goals: Comfort    TREATMENT PREFERENCES:   Code Status: DNR    Advance Care Planning:  [x] [facility] Interdisciplinary Team has updated the ACP Navigator with Health Care Decision Maker and Patient Capacity      Primary Decision Maker: Joshua Mariee - [phone]    Advance Care Planning 2/7/2020   Patient's Healthcare Decision Maker is: Legal Next of Kin   Confirm Advance Directive None   Patient Would Like to Complete Advance Directive No       Medical Interventions: Comfort measures     Other Instructions: Other:    As far as possible, the palliative care team has discussed with patient / health care proxy about goals of care / treatment preferences for patient.      HISTORY:     History obtained from: chart, patient, wife    CHIEF COMPLAINT: none    HPI/SUBJECTIVE:    The patient is:   [x] Verbal and participatory  [] Non-participatory due to:     Patinet is more alert, conversational, in good spirits today  Persistent clearing of throat/coughing    Clinical Pain Assessment (nonverbal scale for severity on nonverbal patients):   Clinical Pain Assessment  Severity: 3  Location: legs/back  Character: aching/sharp  Duration: chronic  Factors: pain meds help some  Frequency: constant          Duration: for how long has pt been experiencing pain (e.g., 2 days, 1 month, years)  Frequency: how often pain is an issue (e.g., several times per day, once every few days, constant)     FUNCTIONAL ASSESSMENT:     Palliative Performance Scale (PPS):  PPS: 30       PSYCHOSOCIAL/SPIRITUAL SCREENING:     Palliative IDT has assessed this patient for cultural preferences / practices and a referral made as appropriate to needs (Cultural Services, Patient Advocacy, Ethics, etc.)    Any spiritual / Advent concerns:  [] Yes /  [x] No    Caregiver Burnout:  [] Yes /  [x] No /  [] No Caregiver Present      Anticipatory grief assessment:   [x] Normal  / [] Maladaptive       ESAS Anxiety: Anxiety: 0    ESAS Depression: Depression: 0        REVIEW OF SYSTEMS:     Positive and pertinent negative findings in ROS are noted above in HPI. The following systems were [x] reviewed / [] unable to be reviewed as noted in HPI  Other findings are noted below. Systems: constitutional, ears/nose/mouth/throat, respiratory, gastrointestinal, genitourinary, musculoskeletal, integumentary, neurologic, psychiatric, endocrine. Positive findings noted below. Modified ESAS Completed by: provider   Fatigue: 3     Depression: 0 Pain: 3   Anxiety: 0 Nausea: 0   Anorexia: 4 Dyspnea: 4     Constipation: No              PHYSICAL EXAM:     From RN flowsheet:  Wt Readings from Last 3 Encounters:   02/11/20 (!) 364 lb 10.3 oz (165.4 kg)   01/28/14 (!) 375 lb (170.1 kg)     Blood pressure 157/56, pulse 72, temperature 98.7 °F (37.1 °C), resp. rate 20, height 5' 11\" (1.803 m), weight (!) 364 lb 10.3 oz (165.4 kg), SpO2 96 %.     Pain Scale 1: Numeric (0 - 10)  Pain Intensity 1: 0  Pain Onset 1: chronic  Pain Location 1: Back  Pain Orientation 1: Posterior, Lower  Pain Description 1: Aching  Pain Intervention(s) 1: Medication (see MAR)  Last bowel movement, if known: Constitutional: alert, obese but appears frail and chronically ill  Eyes: pupils equal, anicteric  ENMT: no nasal discharge, moist mucous membranes  Cardiovascular: very edematous in his legs and feet  Respiratory: breathing slightly labored,   Gastrointestinal: soft non-tender, +bowel sounds  Musculoskeletal: no deformity, no tenderness to palpation  Skin: warm, dry  Neurologic: following commands, moving all extremities  Psychiatric: full affect, no hallucinations  Other:       HISTORY:     Active Problems:    Renal cell carcinoma (formerly [facility]) (12/1/2019)      Paroxysmal A-fib (formerly [facility]) (12/1/2019)      Acute on chronic respiratory failure with hypoxia (formerly [facility]) (2/6/2020)      Anasarca (2/6/2020)      Acute on chronic diastolic heart failure (formerly [facility]) (2/6/2020)      Chronic pain (2/6/2020)      HTN (hypertension) (2/6/2020)      Type 2 diabetes mellitus ([facility] [address] [address] (2/6/2020)      CAD (coronary artery disease) (2/6/2020)      BPH (benign prostatic hyperplasia) (2/6/2020)      Hyperlipidemia (2/6/2020)      Gout (2/6/2020)      Past Medical History:   Diagnosis Date    CAD (coronary artery disease)     Cancer ([facility]     RENAL     CKD (chronic kidney disease) stage 3, GFR 30-59 ml/min (formerly [facility])     Diabetes ([address]     TYPE 2    Hypertension     Peripheral neuropathy     Sleep disorder     SLEEP APNEA      Past Surgical History:   Procedure Laterality Date    CARDIAC SURG PROCEDURE UNLIST      HX NEPHROSTOMY      HX ORTHOPAEDIC      2 KNEE REPLACEMENTS    IR STENT PLACEMENT        No family history on file. History reviewed, no pertinent family history.   Social History     Tobacco Use    Smoking status: Never Smoker    Smokeless tobacco: Never Used   Substance Use Topics    Alcohol use: Never     Frequency: Never     Allergies   Allergen Reactions    Allopurinol Rash    Gabapentin Other (comments)     nightmares    Paradione Hives      Current Facility-Administered Medications   Medication Dose Route Frequency    torsemide (DEMADEX) tablet 60 mg  60 mg Oral DAILY    oxyCODONE IR (ROXICODONE) tablet 5 mg  5 mg Oral Q4H PRN    acetaminophen (TYLENOL) tablet 1,000 mg  1,000 mg Oral TID    guaiFENesin (ROBITUSSIN) 100 mg/5 mL oral liquid 400 mg  400 mg Oral TID    [START ON 2/13/2020] metOLazone (ZAROXOLYN) tablet 5 mg  5 mg Oral ONCE    polyethylene glycol (MIRALAX) packet 17 g  17 g Oral DAILY PRN    senna-docusate (PERICOLACE) 8.6-50 mg per tablet 1 Tab  1 Tab Oral QHS    sodium chloride (OCEAN) 0.65 % nasal squeeze bottle 2 Spray  2 Spray Both Nostrils Q2H PRN    albuterol-ipratropium (DUO-NEB) 2.5 MG-0.5 MG/3 ML  3 mL Nebulization Q6HWA RT    albuterol-ipratropium (DUO-NEB) 2.5 MG-0.5 MG/3 ML  3 mL Nebulization Q6H PRN    insulin glargine (LANTUS) injection 40 Units  40 Units SubCUTAneous DAILY    hydrALAZINE (APRESOLINE) 20 mg/mL injection 20 mg  20 mg IntraVENous Q6H PRN    pantoprazole (PROTONIX) tablet 40 mg  40 mg Oral DAILY    insulin lispro (HUMALOG) injection 5 Units  5 Units SubCUTAneous TIDAC    **Can pt supply own Cabozantinib 40 mg tab (Cabometyx, Cometriq )    1 Each Other DAILY    metoprolol tartrate (LOPRESSOR) tablet 6.25 mg  6.25 mg Oral BID    sodium chloride (NS) flush 5-40 mL  5-40 mL IntraVENous Q8H    sodium chloride (NS) flush 5-40 mL  5-40 mL IntraVENous PRN    ondansetron (ZOFRAN) injection 4 mg  4 mg IntraVENous Q4H PRN    apixaban (ELIQUIS) tablet 5 mg  5 mg Oral Q12H    febuxostat (ULORIC) tablet 80 mg  80 mg Oral DAILY    finasteride (PROSCAR) tablet 5 mg  5 mg Oral QHS    terazosin (HYTRIN) capsule 10 mg  10 mg Oral QHS    insulin lispro (HUMALOG) injection   SubCUTAneous AC&HS    glucose chewable tablet 16 g  4 Tab Oral PRN    dextrose (D50W) injection syrg 12.5-25 g  12.5-25 g IntraVENous PRN    glucagon (GLUCAGEN) injection 1 mg  1 mg IntraMUSCular PRN    neomycin-bacitracin-polymyxin (NEOSPORIN) ointment   Topical BID          LAB AND IMAGING FINDINGS:     Lab Results   Component Value Date/Time    WBC 9.1 02/09/2020 02:58 AM    HGB 8.4 (L) 02/11/2020 10:55 AM    PLATELET 222 08/45/5318 02:58 AM     Lab Results   Component Value Date/Time    Sodium 134 (L) 02/11/2020 03:32 AM    Potassium 3.8 02/11/2020 03:32 AM    Chloride 93 (L) 02/11/2020 03:32 AM    CO2 34 (H) 02/11/2020 03:32 AM    BUN 64 (H) 02/11/2020 03:32 AM    Creatinine 1.67 (H) 02/11/2020 03:32 AM    Calcium 8.9 02/11/2020 03:32 AM    Magnesium 2.7 (H) 12/05/2019 03:30 AM    Phosphorus 3.4 02/11/2020 03:32 AM      Lab Results   Component Value Date/Time    AST (SGOT) 24 02/09/2020 02:58 AM    Alk. phosphatase 403 (H) 02/09/2020 02:58 AM    Protein, total 5.8 (L) 02/09/2020 02:58 AM    Albumin 2.4 (L) 02/11/2020 03:32 AM    Globulin 3.4 02/09/2020 02:58 AM     No results found for: INR, PTMR, PTP, PT1, PT2, APTT, INREXT, INREXT   No results found for: IRON, FE, TIBC, IBCT, PSAT, FERR   No results found for: PH, PCO2, PO2  No components found for: GLPOC   No results found for: CPK, CKMB             Total time:   Counseling / coordination time, spent as noted above:   > 50% counseling / coordination?:     Prolonged service was provided for  []30 min   []75 min in face to face time in the presence of the patient, spent as noted above. Time Start:   Time End:   Note: this can only be billed with 34069 (initial) or 58559 (follow up). If multiple start / stop times, list each separately.

## 2020-02-11 NOTE — PROGRESS NOTES
Hospitalist Progress Note    NAME: Warden Machado   :  1945   MRN:  [id number]       Assessment / Plan:  Acute on chronic respiratory failure with hypoxia, baseline 2L   Anasarca  [place] with CKD3  Due to Acute on chronic diastolic heart failure  PRN BiPAP  Given 1mg IV Bumex in ED without diuresis, s/p 2mg IV BID,increased to bumex 3mg q8h per nephro . Also ordered  Diuril 500mg IV once   Receive a dose of metalozone 5mg PO on   Monitor I/Os, daily weight and lytes  Appreciate Nephrology input and management   UA negative for proteinuria   Renal US showed :  1. Left kidney is within normal limits. 2. Patient is status post right nephrectomy. In the right nephrectomy bed, there  is a 4.5 cm nonspecific hypoechoic mass. 3. Urinary bladder is unremarkable.   -UO 3 L in the last 24 hr  -breathing better now  -Cr stable from   -electrolytes are stable too      -UO 1800 CC IN THE LAST 24 HR  - breathing cont to improve but not back to his baseline yet, as per patient   - Cr improving today 2.4 from 2.6 yesterday  -on 3 L NC  - he would like to talk to hospice care    2/10  Patient changed his CODE STATUS to DNR   We will continue diuresis as per nephrology recommendation and switch to torsemide 60 mg twice daily which is home dose and give metolazone 5 mg twice weekly.   Nephrology input is appreciated   Urine output in the last 24 hours 1775 cc  -Patient now is waiting bed to be transferred to [facility]      -UO 1900 for the last 24 hr  - on torsemide 60 mg BID and metolazone 5 mg twice weekly now  -Cr is slightly increased from yesterday   -clinically is improving  -cont I&Os   -awaiting hospice decision.      Hematuria   Penile lesion   uro consulted , persistent hematuria ,had intermittent bladder irrigation and davis changed on   Hb stable , monitor H&H q8h as pt on eliquis       -resume Eliquis 5 mg BID as per Urology recommendations  - no further hematuria 2/9  -Urine is still clear without obvious blood   -Hbg 8.1 from 8.4 yesterday, will cont to monitor now and if cont to drop then we will consider holding AC again    2/11  -f/u H&H today       Hypervolemic Hypernatremia  -secondary to CHF  -cont Bumix to treat the hypervolemia   - f/u BMP daily    2/9  -Hyponatremia improved Na 137 today from 133 yesterday     B/l leg redness without warmth and tenderness  Monitor leg swelling as risk for cellulitis, low threshold to start abx     Renal cell carcinoma s/p R Nephrectomy     Chronic A-fib, rate controlled  HTN  Mild bradycardia, doesn't remember the exact dose for metoprolol and claims meds has been adjusted recently  metoprolol dose 12.5mg bid ( decreased from home dose) with holding parameters     2/8  -resume Eliquis 5 mg BID as per Urology recommendations    2/9  -Hbg 8.1 from 8.4 yesterday, will cont to monitor now and if cont to drop then we will consider holding AC again    Chronic pain   Continue Percocet     Type 2 diabetes mellitus   lantus to 40 units, increased from home dose due to hyperglycemia  lipsro 5units TIDAC   Correction scale      CAD (coronary artery disease)     BPH (benign prostatic hyperplasia)  Continue Hytrin and Finesteride     Hyperlipidemia   Continue statins     Gout   Continue Uloric    Morbid obesity      Code Status:  DNR  Surrogate Decision Maker: Wife     DVT Prophylaxis: Eliquis  GI Prophylaxis: not indicated     Baseline: functional      40 or above Morbid obesity / Body mass index is 50.86 kg/m². Subjective:     Chief Complaint / Reason for Physician Visit  FU CHF/hematuria/hyponatremia . Still with anasarca and SOB , requiring 3 liter of oxygen      Discussed with RN events overnight. Watching TV, said \" I am feeling better today\".         Review of Systems:  Symptom Y/N Comments  Symptom Y/N Comments   Fever/Chills    Chest Pain n    Poor Appetite    Edema y    Cough    Abdominal Pain n    Sputum    Joint Pain SOB/LOMBARDI y improving   Pruritis/Rash     Nausea/vomit n   Tolerating PT/OT     Diarrhea    Tolerating Diet y    Constipation    Other             Objective:     VITALS:   Last 24hrs VS reviewed since prior progress note.  Most recent are:  Patient Vitals for the past 24 hrs:   Temp Pulse Resp BP SpO2   02/11/20 0820     94 %   02/11/20 0724 98.3 °F (36.8 °C) 69 18 114/56 93 %   02/11/20 0719 100.3 °F (37.9 °C) 71 18 162/62 94 %   02/11/20 0715    114/56    02/11/20 0422  83 17  93 %   02/11/20 0417  83   94 %   02/11/20 0412  71 15  94 %   02/11/20 0407  78 16  (!) 87 %   02/11/20 0402  76 14  90 %   02/11/20 0357  75 12  (!) 87 %   02/11/20 0352  73 11  92 %   02/11/20 0347  72 12  90 %   02/11/20 0342  71 16  (!) 87 %   02/11/20 0337  81 10  90 %   02/11/20 0332  76 17 134/62 91 %   02/11/20 0327  77 13  (!) 88 %   02/11/20 0322  72 18  (!) 87 %   02/11/20 0317  77 19  (!) 88 %   02/11/20 0312  71 12  93 %   02/11/20 0307  79 15  92 %   02/11/20 0302  80 12  94 %   02/11/20 0257  74 12  94 %   02/11/20 0247  79 9  94 %   02/11/20 0242  88 16  93 %   02/11/20 0237  78 11  93 %   02/11/20 0232  71 11  92 %   02/11/20 0227  75 12  95 %   02/11/20 0222  89 11  93 %   02/11/20 0217  77 12  91 %   02/11/20 0212  84 12  (!) 85 %   02/11/20 0207  81 10  95 %   02/11/20 0202  81 13  95 %   02/11/20 0157  73 12  (!) 83 %   02/11/20 0152  80 12  (!) 81 %   02/11/20 0147  74 12  (!) 87 %   02/11/20 0142  73 11  92 %   02/11/20 0137  71 16  (!) 89 %   02/11/20 0127  79 14  95 %   02/11/20 0122  73 19  93 %   02/11/20 0112  73 13  92 %   02/11/20 0107  77 14  92 %   02/11/20 0102  74 11  92 %   02/11/20 0057  77 18  93 %   02/11/20 0052  71 13  92 %   02/11/20 0047  74 14  92 %   02/11/20 0042  74 16  96 %   02/11/20 0037  76 16  92 %   02/11/20 0032  75 12  95 %   02/11/20 0027  66 12  95 %   02/11/20 0022  71 12  95 % 02/11/20 0017  69 11  95 %   02/11/20 0012  68 13  94 %   02/11/20 0002  75 15  (!) 66 %   02/10/20 2357  73 12  95 %   02/10/20 2352  73 11  96 %   02/10/20 2347  73 10  94 %   02/10/20 2342  77 10  96 %   02/10/20 2337  69 12  95 %   02/10/20 2332  71 20  93 %   02/10/20 2327  75 23  (!) 88 %   02/10/20 2322 97.9 °F (36.6 °C) 72 16 115/62 94 %   02/10/20 2109  78  118/47    02/10/20 2104     95 %   02/10/20 1931 98 °F (36.7 °C) 74 18 125/45 96 %   02/10/20 1459 97.6 °F (36.4 °C) 70 20 101/48 97 %   02/10/20 1026 97.5 °F (36.4 °C) 65 25 140/73 95 %       Intake/Output Summary (Last 24 hours) at 2/11/2020 0933  Last data filed at 2/11/2020 0422  Gross per 24 hour   Intake 360 ml   Output 1900 ml   Net -1540 ml        PHYSICAL EXAM:  General: WD, WN. Alert, cooperative, no acute distress    EENT:  EOMI. Anicteric sclerae. MMM  Resp:  Diminished breath sounds b/l  , no wheezing or rales. No accessory muscle use  CV:  Regular  rhythm, LE  Edema 3+ , anasarca   GI:  Soft, Non distended, Non tender.  +Bowel sounds  Neurologic:  Alert and oriented X 3, normal speech,   Psych:   Good insight. Not anxious nor agitated  Skin:  No rashes. No jaundice, petichae on b/l legs   Penile lesion   : swollen testicles and hematuria in davis bag     Reviewed most current lab test results and cultures  YES  Reviewed most current radiology test results   YES  Review and summation of old records today    NO  Reviewed patient's current orders and MAR    YES  PMH/SH reviewed - no change compared to H&P  ________________________________________________________________________  Care Plan discussed with:    Comments   Patient x    Family      RN x    Care Manager x    Consultant                       x Multidiciplinary team rounds were held today with , nursing, pharmacist and clinical coordinator. Patient's plan of care was discussed; medications were reviewed and discharge planning was addressed. ________________________________________________________________________  Total NON critical care TIME:  35  Minutes    Total CRITICAL CARE TIME Spent:   Minutes non procedure based      Comments   >50% of visit spent in counseling and coordination of care x    ________________________________________________________________________  Hortencia Curtis MD     Procedures: see electronic medical records for all procedures/Xrays and details which were not copied into this note but were reviewed prior to creation of Plan. LABS:  I reviewed today's most current labs and imaging studies.   Pertinent labs include:  Recent Labs     02/10/20  0035 02/09/20  0949 02/09/20 0258   WBC  --   --  9.1   HGB 8.4* 8.6* 8.1*   HCT 27.3* 28.0* 26.6*   PLT  --   --  205     Recent Labs     02/11/20 0332 02/10/20  0035 02/09/20  0258   * 135* 137   K 3.8 3.5 3.8   CL 93* 95* 96*   CO2 34* 34* 33*   * 234* 217*   BUN 64* 70* 69*   CREA 1.67* 1.64* 1.71*   CA 8.9 8.9 8.8   PHOS 3.4 2.8 3.3   ALB 2.4* 2.4* 2.4*   TBILI  --   --  0.9   SGOT  --   --  24   ALT  --   --  20       Signed: Hortencia Curtis MD

## 2020-02-12 LAB
GLUCOSE BLD STRIP.AUTO-MCNC: 261 MG/DL (ref 65–100)
GLUCOSE BLD STRIP.AUTO-MCNC: 298 MG/DL (ref 65–100)
GLUCOSE BLD STRIP.AUTO-MCNC: 335 MG/DL (ref 65–100)
GLUCOSE BLD STRIP.AUTO-MCNC: 354 MG/DL (ref 65–100)
SERVICE CMNT-IMP: ABNORMAL

## 2020-02-12 PROCEDURE — 77010033678 HC OXYGEN DAILY

## 2020-02-12 PROCEDURE — 94760 N-INVAS EAR/PLS OXIMETRY 1: CPT

## 2020-02-12 PROCEDURE — 74011636637 HC RX REV CODE- 636/637: Performed by: INTERNAL MEDICINE

## 2020-02-12 PROCEDURE — 82962 GLUCOSE BLOOD TEST: CPT

## 2020-02-12 PROCEDURE — 94640 AIRWAY INHALATION TREATMENT: CPT

## 2020-02-12 PROCEDURE — 74011000250 HC RX REV CODE- 250: Performed by: INTERNAL MEDICINE

## 2020-02-12 PROCEDURE — 74011250637 HC RX REV CODE- 250/637: Performed by: FAMILY MEDICINE

## 2020-02-12 PROCEDURE — 74011250637 HC RX REV CODE- 250/637: Performed by: INTERNAL MEDICINE

## 2020-02-12 PROCEDURE — 74011250637 HC RX REV CODE- 250/637: Performed by: NURSE PRACTITIONER

## 2020-02-12 PROCEDURE — 65270000015 HC RM PRIVATE ONCOLOGY

## 2020-02-12 RX ORDER — INSULIN GLARGINE 100 [IU]/ML
45 INJECTION, SOLUTION SUBCUTANEOUS DAILY
Status: DISCONTINUED | OUTPATIENT
Start: 2020-02-13 | End: 2020-02-13

## 2020-02-12 RX ORDER — INSULIN LISPRO 100 [IU]/ML
10 INJECTION, SOLUTION INTRAVENOUS; SUBCUTANEOUS
Status: DISCONTINUED | OUTPATIENT
Start: 2020-02-12 | End: 2020-02-13

## 2020-02-12 RX ADMIN — METOPROLOL TARTRATE 6.25 MG: 25 TABLET ORAL at 09:11

## 2020-02-12 RX ADMIN — OXYCODONE 5 MG: 5 TABLET ORAL at 18:42

## 2020-02-12 RX ADMIN — INSULIN LISPRO 5 UNITS: 100 INJECTION, SOLUTION INTRAVENOUS; SUBCUTANEOUS at 09:08

## 2020-02-12 RX ADMIN — INSULIN LISPRO 3 UNITS: 100 INJECTION, SOLUTION INTRAVENOUS; SUBCUTANEOUS at 21:05

## 2020-02-12 RX ADMIN — Medication 10 ML: at 21:04

## 2020-02-12 RX ADMIN — POLYMYXIN B SULFATE, BACITRACIN ZINC, NEOMYCIN SULFATE: 5000; 3.5; 4 OINTMENT TOPICAL at 09:21

## 2020-02-12 RX ADMIN — FEBUXOSTAT 80 MG: 40 TABLET ORAL at 09:12

## 2020-02-12 RX ADMIN — Medication 10 ML: at 05:52

## 2020-02-12 RX ADMIN — ACETAMINOPHEN 1000 MG: 500 TABLET ORAL at 09:10

## 2020-02-12 RX ADMIN — GUAIFENESIN 400 MG: 200 SOLUTION ORAL at 09:10

## 2020-02-12 RX ADMIN — POLYMYXIN B SULFATE, BACITRACIN ZINC, NEOMYCIN SULFATE: 5000; 3.5; 4 OINTMENT TOPICAL at 17:15

## 2020-02-12 RX ADMIN — PANTOPRAZOLE SODIUM 40 MG: 40 TABLET, DELAYED RELEASE ORAL at 05:52

## 2020-02-12 RX ADMIN — INSULIN LISPRO 7 UNITS: 100 INJECTION, SOLUTION INTRAVENOUS; SUBCUTANEOUS at 17:14

## 2020-02-12 RX ADMIN — INSULIN LISPRO 10 UNITS: 100 INJECTION, SOLUTION INTRAVENOUS; SUBCUTANEOUS at 12:19

## 2020-02-12 RX ADMIN — FINASTERIDE 5 MG: 5 TABLET, FILM COATED ORAL at 21:04

## 2020-02-12 RX ADMIN — TORSEMIDE 60 MG: 20 TABLET ORAL at 09:10

## 2020-02-12 RX ADMIN — INSULIN GLARGINE 40 UNITS: 100 INJECTION, SOLUTION SUBCUTANEOUS at 09:08

## 2020-02-12 RX ADMIN — GUAIFENESIN 400 MG: 200 SOLUTION ORAL at 21:04

## 2020-02-12 RX ADMIN — IPRATROPIUM BROMIDE AND ALBUTEROL SULFATE 3 ML: .5; 3 SOLUTION RESPIRATORY (INHALATION) at 07:50

## 2020-02-12 RX ADMIN — ACETAMINOPHEN 1000 MG: 500 TABLET ORAL at 21:04

## 2020-02-12 RX ADMIN — OXYCODONE 5 MG: 5 TABLET ORAL at 05:52

## 2020-02-12 RX ADMIN — INSULIN LISPRO 10 UNITS: 100 INJECTION, SOLUTION INTRAVENOUS; SUBCUTANEOUS at 17:14

## 2020-02-12 RX ADMIN — GUAIFENESIN 400 MG: 200 SOLUTION ORAL at 17:05

## 2020-02-12 RX ADMIN — Medication 10 ML: at 13:29

## 2020-02-12 RX ADMIN — ACETAMINOPHEN 1000 MG: 500 TABLET ORAL at 17:05

## 2020-02-12 RX ADMIN — DOCUSATE SODIUM - SENNOSIDES 1 TABLET: 50; 8.6 TABLET, FILM COATED ORAL at 21:04

## 2020-02-12 RX ADMIN — INSULIN LISPRO 5 UNITS: 100 INJECTION, SOLUTION INTRAVENOUS; SUBCUTANEOUS at 12:20

## 2020-02-12 RX ADMIN — TERAZOSIN HYDROCHLORIDE 10 MG: 5 CAPSULE ORAL at 21:09

## 2020-02-12 RX ADMIN — APIXABAN 5 MG: 5 TABLET, FILM COATED ORAL at 09:11

## 2020-02-12 RX ADMIN — APIXABAN 5 MG: 5 TABLET, FILM COATED ORAL at 21:04

## 2020-02-12 RX ADMIN — METOPROLOL TARTRATE 6.25 MG: 25 TABLET ORAL at 17:05

## 2020-02-12 NOTE — PROGRESS NOTES
Oncology End of Shift Note      Bedside shift change report given to  [facility] TARIK JOHN (incoming nurse) by Bryant Bansal (outgoing nurse) on [facility]. Report included the following information SBAR, Kardex, Intake/Output, MAR and Quality Measures. Shift Summary:  No acute events overnight. Pain meds given for back pain. Guzman irrigated this shift. Wound care completed. Issues for Physician to Address:       Patient on Cardiac Monitoring?     [] Yes  [x] No    Rhythm:          Shift Events        Bryant Bansal

## 2020-02-12 NOTE — PROGRESS NOTES
ADULT PROTOCOL: JET AEROSOL ASSESSMENT    Patient  [address]     75 y.o.   male     2/12/2020  8:39 AM    Breath Sounds Pre Procedure: Right Breath Sounds: Clear                               Left Breath Sounds: Clear    Breath Sounds Post Procedure: Right Breath Sounds: Clear                                 Left Breath Sounds: Clear    Breathing pattern: Pre procedure Breathing Pattern: Regular          Post procedure Breathing Pattern: Regular    Heart Rate: Pre procedure Pulse: 69           Post procedure Pulse: 71    Resp Rate: Pre procedure Respirations: 18           Post procedure Respirations: 18            Cough: Pre procedure Cough: Non-productive               Post procedure Cough: Non-productive      Oxygen: O2 Device: Nasal cannula   4L         SpO2: Pre procedure SpO2: 94 %                 Post procedure SpO2: 95 %      Nebulizer Therapy: Current medications Aerosolized Medications: DuoNeb        Smoking History: Never      Problem List:   Patient Active Problem List   Diagnosis Code    Renal cell carcinoma (HCC) C64.9    Paroxysmal A-fib (HCC) I48.0    Hematuria R31.9    Acute on chronic respiratory failure with hypoxia (HCC) J96.21    Anasarca R60.1    Acute on chronic diastolic heart failure (HCC) I50.33    Chronic pain G89.29    HTN (hypertension) I10    Type 2 diabetes mellitus (HCC) E11.9    CAD (coronary artery disease) I25.10    BPH (benign prostatic hyperplasia) N40.0    Hyperlipidemia E78.5    Gout M10.9       Respiratory Therapist: RT Taniya

## 2020-02-12 NOTE — PROGRESS NOTES
Oncology End of Shift Note      Bedside shift change report given to [facility] Rochelle Beth RN (incoming nurse) by Gordon Mckenna RN (outgoing nurse) on [facility]. Report included the following information SBAR, Kardex and MAR. Shift Summary:   Patient tolerated care well through out shift. All scheduled medications given. Notified MD of afternoon blood glucose of 353, adjustments to insulin made, see MAR. Patient turned with turn team. Guzman and wound care completed. Hourly rounding completed. Issues for Physician to Address:       Patient on Cardiac Monitoring?     [] Yes  [x] No    Rhythm:          Shift Events        Gordon Mckenna RN

## 2020-02-12 NOTE — PROGRESS NOTES
Problem: Falls - Risk of  Goal: *Absence of Falls  Description  Document Fletcher Boston Fall Risk and appropriate interventions in the flowsheet. Outcome: Progressing Towards Goal  Note: Fall Risk Interventions:       Mentation Interventions: Adequate sleep, hydration, pain control    Medication Interventions: Bed/chair exit alarm, Patient to call before getting OOB, Teach patient to arise slowly    Elimination Interventions: Call light in reach              Problem: Patient Education: Go to Patient Education Activity  Goal: Patient/Family Education  Outcome: Progressing Towards Goal     Problem: Pressure Injury - Risk of  Goal: *Prevention of pressure injury  Description  Document Jose Roberto Scale and appropriate interventions in the flowsheet. Outcome: Progressing Towards Goal  Note: Pressure Injury Interventions:  Sensory Interventions: Assess changes in LOC    Moisture Interventions: Absorbent underpads, Minimize layers    Activity Interventions: Assess need for specialty bed    Mobility Interventions: Assess need for specialty bed    Nutrition Interventions: Document food/fluid/supplement intake    Friction and Shear Interventions: Apply protective barrier, creams and emollients, HOB 30 degrees or less, Minimize layers                Problem: Patient Education: Go to Patient Education Activity  Goal: Patient/Family Education  Outcome: Progressing Towards Goal     Problem:  Activity Intolerance  Goal: *Oxygen saturation during activity within specified parameters  Outcome: Progressing Towards Goal  Goal: *Able to remain out of bed as prescribed  Outcome: Progressing Towards Goal     Problem: Patient Education: Go to Patient Education Activity  Goal: Patient/Family Education  Outcome: Progressing Towards Goal     Problem: Infection - Risk of, Urinary Catheter-Associated Urinary Tract Infection  Goal: *Absence of infection signs and symptoms  Outcome: Progressing Towards Goal     Problem: Patient Education: Go to Patient Education Activity  Goal: Patient/Family Education  Outcome: Progressing Towards Goal     Problem: Patient Education: Go to Patient Education Activity  Goal: Patient/Family Education  Outcome: Progressing Towards Goal     Problem: Heart Failure: Day 1  Goal: Off Pathway (Use only if patient is Off Pathway)  Outcome: Progressing Towards Goal  Goal: Activity/Safety  Outcome: Progressing Towards Goal  Goal: Consults, if ordered  Outcome: Progressing Towards Goal  Goal: Diagnostic Test/Procedures  Outcome: Progressing Towards Goal  Goal: Nutrition/Diet  Outcome: Progressing Towards Goal  Goal: Discharge Planning  Outcome: Progressing Towards Goal  Goal: Medications  Outcome: Progressing Towards Goal  Goal: Respiratory  Outcome: Progressing Towards Goal  Goal: Treatments/Interventions/Procedures  Outcome: Progressing Towards Goal  Goal: Psychosocial  Outcome: Progressing Towards Goal  Goal: *Oxygen saturation within defined limits  Outcome: Progressing Towards Goal  Goal: *Hemodynamically stable  Outcome: Progressing Towards Goal  Goal: *Optimal pain control at patient's stated goal  Outcome: Progressing Towards Goal  Goal: *Anxiety reduced or absent  Outcome: Progressing Towards Goal     Problem: Heart Failure: Day 2  Goal: Off Pathway (Use only if patient is Off Pathway)  Outcome: Progressing Towards Goal  Goal: Activity/Safety  Outcome: Progressing Towards Goal  Goal: Consults, if ordered  Outcome: Progressing Towards Goal  Goal: Diagnostic Test/Procedures  Outcome: Progressing Towards Goal  Goal: Nutrition/Diet  Outcome: Progressing Towards Goal  Goal: Discharge Planning  Outcome: Progressing Towards Goal  Goal: Medications  Outcome: Progressing Towards Goal  Goal: Respiratory  Outcome: Progressing Towards Goal  Goal: Treatments/Interventions/Procedures  Outcome: Progressing Towards Goal  Goal: Psychosocial  Outcome: Progressing Towards Goal  Goal: *Oxygen saturation within defined limits  Outcome: Progressing Towards Goal  Goal: *Hemodynamically stable  Outcome: Progressing Towards Goal  Goal: *Optimal pain control at patient's stated goal  Outcome: Progressing Towards Goal  Goal: *Anxiety reduced or absent  Outcome: Progressing Towards Goal  Goal: *Demonstrates progressive activity  Outcome: Progressing Towards Goal     Problem: Heart Failure: Day 3  Goal: Off Pathway (Use only if patient is Off Pathway)  Outcome: Progressing Towards Goal  Goal: Activity/Safety  Outcome: Progressing Towards Goal  Goal: Diagnostic Test/Procedures  Outcome: Progressing Towards Goal  Goal: Nutrition/Diet  Outcome: Progressing Towards Goal  Goal: Discharge Planning  Outcome: Progressing Towards Goal  Goal: Medications  Outcome: Progressing Towards Goal  Goal: Respiratory  Outcome: Progressing Towards Goal  Goal: Treatments/Interventions/Procedures  Outcome: Progressing Towards Goal  Goal: Psychosocial  Outcome: Progressing Towards Goal  Goal: *Oxygen saturation within defined limits  Outcome: Progressing Towards Goal  Goal: *Hemodynamically stable  Outcome: Progressing Towards Goal  Goal: *Optimal pain control at patient's stated goal  Outcome: Progressing Towards Goal  Goal: *Anxiety reduced or absent  Outcome: Progressing Towards Goal  Goal: *Demonstrates progressive activity  Outcome: Progressing Towards Goal     Problem: Heart Failure: Day 4  Goal: Off Pathway (Use only if patient is Off Pathway)  Outcome: Progressing Towards Goal  Goal: Activity/Safety  Outcome: Progressing Towards Goal  Goal: Diagnostic Test/Procedures  Outcome: Progressing Towards Goal  Goal: Nutrition/Diet  Outcome: Progressing Towards Goal  Goal: Discharge Planning  Outcome: Progressing Towards Goal  Goal: Medications  Outcome: Progressing Towards Goal  Goal: Respiratory  Outcome: Progressing Towards Goal  Goal: Treatments/Interventions/Procedures  Outcome: Progressing Towards Goal  Goal: Psychosocial  Outcome: Progressing Towards Goal  Goal: *Oxygen saturation within defined limits  Outcome: Progressing Towards Goal  Goal: *Hemodynamically stable  Outcome: Progressing Towards Goal  Goal: *Optimal pain control at patient's stated goal  Outcome: Progressing Towards Goal  Goal: *Anxiety reduced or absent  Outcome: Progressing Towards Goal  Goal: *Demonstrates progressive activity  Outcome: Progressing Towards Goal     Problem: Heart Failure: Day 5  Goal: Off Pathway (Use only if patient is Off Pathway)  Outcome: Progressing Towards Goal  Goal: Activity/Safety  Outcome: Progressing Towards Goal  Goal: Diagnostic Test/Procedures  Outcome: Progressing Towards Goal  Goal: Nutrition/Diet  Outcome: Progressing Towards Goal  Goal: Discharge Planning  Outcome: Progressing Towards Goal  Goal: Medications  Outcome: Progressing Towards Goal  Goal: Respiratory  Outcome: Progressing Towards Goal  Goal: Treatments/Interventions/Procedures  Outcome: Progressing Towards Goal  Goal: Psychosocial  Outcome: Progressing Towards Goal     Problem: Heart Failure: Discharge Outcomes  Goal: *Demonstrates ability to perform prescribed activity without shortness of breath or discomfort  Outcome: Progressing Towards Goal  Goal: *Left ventricular function assessment completed prior to or during stay, or planned for post-discharge  Outcome: Progressing Towards Goal  Goal: *ACEI prescribed if LVEF less than 40% and no contraindications or ARB prescribed  Outcome: Progressing Towards Goal  Goal: *Verbalizes understanding and describes prescribed diet  Outcome: Progressing Towards Goal  Goal: *Verbalizes understanding/describes prescribed medications  Outcome: Progressing Towards Goal  Goal: *Describes available resources and support systems  Description  (eg: Home Health, Palliative Care, Advanced Medical Directive)  Outcome: Progressing Towards Goal  Goal: *Describes smoking cessation resources  Outcome: Progressing Towards Goal  Goal: *Understands and describes signs and symptoms to report to providers(Stroke Metric)  Outcome: Progressing Towards Goal  Goal: *Describes/verbalizes understanding of follow-up/return appt  Description  (eg: to physicians, diabetes treatment coordinator, and other resources  Outcome: Progressing Towards Goal  Goal: *Describes importance of continuing daily weights and changes to report to physician  Outcome: Progressing Towards Goal

## 2020-02-12 NOTE — PROGRESS NOTES
Nutrition Assessment:    INTERVENTIONS/RECOMMENDATIONS:   · Continue current diet  · Liberalize diet as medically feasible to align with comfort focused care  · Continue supplements as desired    ASSESSMENT:   Chart reviewed. Per Palliative note, pt wants to focus on comfort care. He reports fair appetite. Flowsheet documents ~50% of meals consumed on average over the past 72 hrs. He is consuming glucerna shakes BID. BG elevate >200 however if goal is to focus on comfort, further carb restrictions is not recommended. Diet Order: Consistent carb(2g Na)  % Eaten:    Patient Vitals for the past 72 hrs:   % Diet Eaten   02/11/20 1200 15 %   02/11/20 0850 50 %   02/10/20 1818 75 %   02/10/20 1200 25 %   02/10/20 0850 50 %   02/09/20 1213 90 %   02/09/20 0858 25 %     Pertinent Medications: [x]Reviewed: lantus, humalog, PPI, miralax, pericolace  Pertinent Labs: [x]Reviewed:  BG > 200, Na 134,   Food Allergies: [x]NKFA  []Other   Last BM:  PTA  Edema:      [x]RUE 2+  [x]LUE 2+  [x]RLE 4+  [x]LLE 4+     Pressure Ulcer: unstageable (left heel)     [] Stage I   [] Stage II   [] Stage III   [] Stage IV      Anthropometrics: Height: 5' 11\" (180.3 cm) Weight: (!) 165.4 kg (364 lb 10.3 oz)    IBW (%IBW):   ( ) UBW (%UBW):   (  %)    BMI: Body mass index is 50.86 kg/m². This BMI is indicative of:  []Underweight   []Normal   []Overweight   [] Obesity   [x] Extreme Obesity (BMI>40)  Last Weight Metrics:  Weight Loss Metrics 2/11/2020 12/2/2019 1/28/2014   Today's Wt 364 lb 10.3 oz 348 lb 1.7 oz 375 lb   BMI 50.86 kg/m2 48.55 kg/m2 -       Estimated Nutrition Needs (Based on): 2127 Kcals/day(BMR (2397) x 1. 2AF -750kcal) , 101 g(1.3 g/kg IBW) Protein  Carbohydrate:  At Least 130 g/day  Fluids: 2125 mL/day or per primary team    NUTRITION DIAGNOSES:   Problem:  Increased nutrient needs      Etiology: related to wound healing, body habitus     Signs/Symptoms: as evidenced by unstageable left heel, BMI 50    Previous Nutrition Dx: [] Resolved  [] Unresolved           [x] Progressing    NUTRITION INTERVENTIONS:  Meals/Snacks: General/healthful diet   Supplements: Commercial supplement              GOAL:   PO intake >50% of meals/supplements next 3-5 days    NUTRITION MONITORING AND EVALUATION      Food/Nutrient Intake Outcomes:  Total energy intake  Physical Signs/Symptoms Outcomes: Weight/weight change, Electrolyte and renal profile, Glucose profile    Previous Goal Met:   [x] Met              [] Progressing Towards Goal              [] Not Progressing Towards Goal   Previous Recommendations:   [x] Implemented          [] Not Implemented          [] Not Applicable    LEARNING NEEDS (Diet, Food/Nutrient-Drug Interaction):    [x] None Identified   [] Identified and Education Provided/Documented   [] Identified and Pt declined/was not appropriate     Cultural, Quaker, OR Ethnic Dietary Needs:    [x] None Identified   [] Identified and Addressed     [x] Interdisciplinary Care Plan Reviewed/Documented    [x] Discharge Planning: comfort feeding vs consistent carb, low Na diet   [] Participated in Interdisciplinary Rounds    NUTRITION RISK:    [] High              [x] Moderate           []  Low  []  Minimal/Uncompromised      Srikanth Ellis RDN  Pager [phone]  Weekend Pager [phone]

## 2020-02-12 NOTE — PROGRESS NOTES
Hospitalist Progress Note    NAME: Esperanza Seaman   :  1945   MRN:  [id number]       Assessment / Plan:  Acute on chronic respiratory failure with hypoxia, baseline 2L   Anasarca  Brenden with CKD3  Due to Acute on chronic diastolic heart failure  PRN BiPAP  Given 1mg IV Bumex in ED without diuresis, s/p 2mg IV BID,increased to bumex 3mg q8h per nephro . Also ordered  Diuril 500mg IV once   Receive a dose of metalozone 5mg PO on   Monitor I/Os, daily weight and lytes  Appreciate Nephrology input and management   UA negative for proteinuria   Renal US showed :  1. Left kidney is within normal limits. 2. Patient is status post right nephrectomy. In the right nephrectomy bed, there  is a 4.5 cm nonspecific hypoechoic mass. 3. Urinary bladder is unremarkable. Patient changed his CODE STATUS to DNR   We will continue diuresis as per nephrology recommendation and switch to torsemide 60 mg twice daily which is home dose and give metolazone 5 mg twice weekly.   Creat stable around 1.6  Nephrology input is appreciated  -Patient now is waiting bed to be transferred to [place] on a palliative unit   - pt decided on comfort measures per palliative note  : no more labs       Hematuria resolved   Penile lesion   uro consulted s/p  intermittent bladder irrigation and davis changed on   Hb stable , eliquis restarted     Hypervolemic Hyponatremia  -secondary to CHF  -c/w torsemide -    B/l leg redness without warmth and tenderness  No signs of cellulitis      Renal cell carcinoma s/p R Nephrectomy     Chronic A-fib, rate controlled  HTN  C/w eliquis  And BB     Chronic pain   Continue Percocet     Type 2 diabetes mellitus   lantus to 40 units, increased to 45units  due to hyperglycemia  lipsro 5units TIDAC , increase to 10units TIDAC   Correction scale      CAD (coronary artery disease)     BPH (benign prostatic hyperplasia)  Continue Hytrin and Finasteride     Hyperlipidemia   Continue statins     Gout   Continue Uloric    Morbid obesity      Code Status:  DNR  Surrogate Decision Maker: Wife     DVT Prophylaxis: Eliquis  GI Prophylaxis: not indicated     Baseline: functional      40 or above Morbid obesity / Body mass index is 50.86 kg/m². Subjective:     Chief Complaint / Reason for Physician Visit  FU CHF/hematuria/hyponatremia . C/o productive cough   Less edema overall       Review of Systems:  Symptom Y/N Comments  Symptom Y/N Comments   Fever/Chills    Chest Pain n    Poor Appetite    Edema y    Cough    Abdominal Pain n    Sputum    Joint Pain     SOB/LOMBARDI y improving   Pruritis/Rash     Nausea/vomit n   Tolerating PT/OT     Diarrhea    Tolerating Diet y    Constipation    Other             Objective:     VITALS:   Last 24hrs VS reviewed since prior progress note. Most recent are:  Patient Vitals for the past 24 hrs:   Temp Pulse Resp BP SpO2   02/12/20 0750     94 %   02/11/20 2331 98.1 °F (36.7 °C) 66 18 124/52 92 %   02/11/20 2040     97 %   02/11/20 1925 98.2 °F (36.8 °C) 79 18 131/55 97 %   02/11/20 1621 98.7 °F (37.1 °C) 72 20 157/56 96 %   02/11/20 1500 98 °F (36.7 °C) 71 20 117/62 94 %   02/11/20 1355     95 %   02/11/20 1112 97.9 °F (36.6 °C) 81 20 135/58 96 %   02/11/20 0820     94 %       Intake/Output Summary (Last 24 hours) at 2/12/2020 0809  Last data filed at 2/12/2020 0520  Gross per 24 hour   Intake 500 ml   Output 950 ml   Net -450 ml        PHYSICAL EXAM:  General: WD, WN. Alert, cooperative, no acute distress    EENT:  EOMI. Anicteric sclerae. MMM  Resp:  Diminished breath sounds b/l  , no wheezing or rales. No accessory muscle use  CV:  Regular  rhythm, LE  Edema 3+ ,   GI:  Soft, Non distended, Non tender.  +Bowel sounds  Neurologic:  Alert and oriented X 3, normal speech,   Psych:   Fair  insight. Not anxious nor agitated  Skin:  No rashes.   No jaundice, petichae on b/l legs   Penile lesion   : swollen testicles and hematuria in davis bag Reviewed most current lab test results and cultures  YES  Reviewed most current radiology test results   YES  Review and summation of old records today    NO  Reviewed patient's current orders and MAR    YES  PMH/SH reviewed - no change compared to H&P  ________________________________________________________________________  Care Plan discussed with:    Comments   Patient x    Family      RN x    Care Manager x    Consultant                       x Multidiciplinary team rounds were held today with , nursing, pharmacist and clinical coordinator. Patient's plan of care was discussed; medications were reviewed and discharge planning was addressed. ________________________________________________________________________  Total NON critical care TIME:  35  Minutes    Total CRITICAL CARE TIME Spent:   Minutes non procedure based      Comments   >50% of visit spent in counseling and coordination of care x    ________________________________________________________________________  Michelle Valente MD     Procedures: see electronic medical records for all procedures/Xrays and details which were not copied into this note but were reviewed prior to creation of Plan. LABS:  I reviewed today's most current labs and imaging studies.   Pertinent labs include:  Recent Labs     02/11/20  1055 02/10/20  0035 02/09/20  0949   HGB 8.4* 8.4* 8.6*   HCT 27.4* 27.3* 28.0*     Recent Labs     02/11/20  0332 02/10/20  0035   * 135*   K 3.8 3.5   CL 93* 95*   CO2 34* 34*   * 234*   BUN 64* 70*   CREA 1.67* 1.64*   CA 8.9 8.9   PHOS 3.4 2.8   ALB 2.4* 2.4*       Signed: Michelle Valente MD

## 2020-02-13 LAB
GLUCOSE BLD STRIP.AUTO-MCNC: 200 MG/DL (ref 65–100)
GLUCOSE BLD STRIP.AUTO-MCNC: 261 MG/DL (ref 65–100)
GLUCOSE BLD STRIP.AUTO-MCNC: 272 MG/DL (ref 65–100)
GLUCOSE BLD STRIP.AUTO-MCNC: 274 MG/DL (ref 65–100)
SERVICE CMNT-IMP: ABNORMAL

## 2020-02-13 PROCEDURE — 74011250637 HC RX REV CODE- 250/637: Performed by: INTERNAL MEDICINE

## 2020-02-13 PROCEDURE — 82962 GLUCOSE BLOOD TEST: CPT

## 2020-02-13 PROCEDURE — 77010033678 HC OXYGEN DAILY

## 2020-02-13 PROCEDURE — 94640 AIRWAY INHALATION TREATMENT: CPT

## 2020-02-13 PROCEDURE — 94760 N-INVAS EAR/PLS OXIMETRY 1: CPT

## 2020-02-13 PROCEDURE — 74011000250 HC RX REV CODE- 250: Performed by: INTERNAL MEDICINE

## 2020-02-13 PROCEDURE — 74011250637 HC RX REV CODE- 250/637: Performed by: NURSE PRACTITIONER

## 2020-02-13 PROCEDURE — 65270000015 HC RM PRIVATE ONCOLOGY

## 2020-02-13 PROCEDURE — 74011636637 HC RX REV CODE- 636/637: Performed by: INTERNAL MEDICINE

## 2020-02-13 PROCEDURE — 74011250637 HC RX REV CODE- 250/637: Performed by: FAMILY MEDICINE

## 2020-02-13 RX ORDER — INSULIN LISPRO 100 [IU]/ML
15 INJECTION, SOLUTION INTRAVENOUS; SUBCUTANEOUS
Status: DISCONTINUED | OUTPATIENT
Start: 2020-02-13 | End: 2020-02-15

## 2020-02-13 RX ORDER — INSULIN GLARGINE 100 [IU]/ML
50 INJECTION, SOLUTION SUBCUTANEOUS DAILY
Status: DISCONTINUED | OUTPATIENT
Start: 2020-02-14 | End: 2020-02-15

## 2020-02-13 RX ORDER — BUMETANIDE 0.25 MG/ML
2 INJECTION INTRAMUSCULAR; INTRAVENOUS ONCE
Status: COMPLETED | OUTPATIENT
Start: 2020-02-13 | End: 2020-02-13

## 2020-02-13 RX ADMIN — GUAIFENESIN 400 MG: 200 SOLUTION ORAL at 16:21

## 2020-02-13 RX ADMIN — IPRATROPIUM BROMIDE AND ALBUTEROL SULFATE 3 ML: .5; 3 SOLUTION RESPIRATORY (INHALATION) at 20:11

## 2020-02-13 RX ADMIN — IPRATROPIUM BROMIDE AND ALBUTEROL SULFATE 3 ML: .5; 3 SOLUTION RESPIRATORY (INHALATION) at 04:52

## 2020-02-13 RX ADMIN — OXYCODONE 5 MG: 5 TABLET ORAL at 06:26

## 2020-02-13 RX ADMIN — INSULIN LISPRO 5 UNITS: 100 INJECTION, SOLUTION INTRAVENOUS; SUBCUTANEOUS at 16:34

## 2020-02-13 RX ADMIN — METOPROLOL TARTRATE 6.25 MG: 25 TABLET ORAL at 18:00

## 2020-02-13 RX ADMIN — GUAIFENESIN 400 MG: 200 SOLUTION ORAL at 23:05

## 2020-02-13 RX ADMIN — INSULIN LISPRO 5 UNITS: 100 INJECTION, SOLUTION INTRAVENOUS; SUBCUTANEOUS at 11:59

## 2020-02-13 RX ADMIN — OXYCODONE 5 MG: 5 TABLET ORAL at 20:38

## 2020-02-13 RX ADMIN — INSULIN LISPRO 15 UNITS: 100 INJECTION, SOLUTION INTRAVENOUS; SUBCUTANEOUS at 16:34

## 2020-02-13 RX ADMIN — ACETAMINOPHEN 1000 MG: 500 TABLET ORAL at 23:05

## 2020-02-13 RX ADMIN — INSULIN GLARGINE 45 UNITS: 100 INJECTION, SOLUTION SUBCUTANEOUS at 08:39

## 2020-02-13 RX ADMIN — INSULIN LISPRO 10 UNITS: 100 INJECTION, SOLUTION INTRAVENOUS; SUBCUTANEOUS at 08:01

## 2020-02-13 RX ADMIN — ACETAMINOPHEN 1000 MG: 500 TABLET ORAL at 16:21

## 2020-02-13 RX ADMIN — TERAZOSIN HYDROCHLORIDE 10 MG: 5 CAPSULE ORAL at 23:06

## 2020-02-13 RX ADMIN — Medication 10 ML: at 23:07

## 2020-02-13 RX ADMIN — INSULIN LISPRO 5 UNITS: 100 INJECTION, SOLUTION INTRAVENOUS; SUBCUTANEOUS at 08:02

## 2020-02-13 RX ADMIN — Medication 10 ML: at 06:26

## 2020-02-13 RX ADMIN — BUMETANIDE 2 MG: 0.25 INJECTION INTRAMUSCULAR; INTRAVENOUS at 16:21

## 2020-02-13 RX ADMIN — FEBUXOSTAT 80 MG: 40 TABLET ORAL at 08:38

## 2020-02-13 RX ADMIN — METOLAZONE 5 MG: 5 TABLET ORAL at 08:39

## 2020-02-13 RX ADMIN — OXYCODONE 5 MG: 5 TABLET ORAL at 16:21

## 2020-02-13 RX ADMIN — FINASTERIDE 5 MG: 5 TABLET, FILM COATED ORAL at 22:00

## 2020-02-13 RX ADMIN — ACETAMINOPHEN 1000 MG: 500 TABLET ORAL at 08:37

## 2020-02-13 RX ADMIN — INSULIN LISPRO 2 UNITS: 100 INJECTION, SOLUTION INTRAVENOUS; SUBCUTANEOUS at 23:06

## 2020-02-13 RX ADMIN — METOPROLOL TARTRATE 6.25 MG: 25 TABLET ORAL at 08:36

## 2020-02-13 RX ADMIN — POLYMYXIN B SULFATE, BACITRACIN ZINC, NEOMYCIN SULFATE: 5000; 3.5; 4 OINTMENT TOPICAL at 18:00

## 2020-02-13 RX ADMIN — APIXABAN 5 MG: 5 TABLET, FILM COATED ORAL at 08:37

## 2020-02-13 RX ADMIN — POLYMYXIN B SULFATE, BACITRACIN ZINC, NEOMYCIN SULFATE: 5000; 3.5; 4 OINTMENT TOPICAL at 09:00

## 2020-02-13 RX ADMIN — INSULIN LISPRO 10 UNITS: 100 INJECTION, SOLUTION INTRAVENOUS; SUBCUTANEOUS at 11:58

## 2020-02-13 RX ADMIN — TORSEMIDE 60 MG: 20 TABLET ORAL at 08:41

## 2020-02-13 RX ADMIN — Medication 10 ML: at 14:00

## 2020-02-13 RX ADMIN — PANTOPRAZOLE SODIUM 40 MG: 40 TABLET, DELAYED RELEASE ORAL at 06:26

## 2020-02-13 RX ADMIN — OXYCODONE 5 MG: 5 TABLET ORAL at 09:58

## 2020-02-13 RX ADMIN — APIXABAN 5 MG: 5 TABLET, FILM COATED ORAL at 20:38

## 2020-02-13 RX ADMIN — DOCUSATE SODIUM - SENNOSIDES 1 TABLET: 50; 8.6 TABLET, FILM COATED ORAL at 23:05

## 2020-02-13 RX ADMIN — GUAIFENESIN 400 MG: 200 SOLUTION ORAL at 08:38

## 2020-02-13 NOTE — CDMP QUERY
Documentation of Acute Renal Jibrenton Wen is noted in the progress notes. Currently the patient does not meet RIFLE criteria (BSV approved) to support this diagnosis. If you are using another criteria to support this diagnosis, please document this in your progress note. Otherwise, please document in the progress notes the clinical indicators that support this diagnosis or state that the diagnosis has been ruled out. RIFLE  (BSV Approved) RISK:  Increased SCr x 1.5 or GFR decrease > 25% (within 7 days) INJURY:  Increased SCr x 2.0 or GFR decreased > 50% FAILURE:  Increased SCr x 3.0 or GFR decrease > 75% or SCr >4.0 mg/dL or acute increase >0.5 mg/dL LOSS:  Persistent acute renal failure = complete loss of kidney function > 4 weeks END STAGE:  End stage of kidney disease > 3 months AKIN 
 
STAGE  1:  Increase in SCr >/= 0.3 mg/dL or >/= 150% to 200% (1.5 to 2-fold) from baseline (within 48 hours) STAGE  2:  Increase in SCr to more than 200% to 300% (>2-3 fold) from baseline STAGE  3:  Increase in SCr to more than 300% (>3-fold) from baseline or SCr >/= 4.0 mg/dL with an acute increase of at least 0.5 mg/dL or initiation of renal replacement therapy KDIGO 
 
STAGE  1:  Increase in SCr by >/= 0.3 mg/dL within 48 hours or increase in SCr 1.5 to 1.9 times baseline which is known or presumed to have occurred within the prior 7 days STAGE  2:  Increase in SCr to 2.0 to 2.9 times baseline STAGE  3:  Increase in SCr to 3.0 times baseline or increase in SCr to >/= baseline or increase in SCr to >/= 4.0 mg/dL or initiation of renal replacement therapy Please clarify and document your clinical opinion in the progress notes and discharge summary including the definitive and/or presumptive diagnosis, (suspected or probable), related to the above clinical findings. Please include clinical findings supporting your diagnosis. Thank you, Susie Putnam [facility]

## 2020-02-13 NOTE — PROGRESS NOTES
Oncology End of Shift Note      Bedside shift change report given to  Ollie Rojas RN (incoming nurse) by Sonya Downing (outgoing nurse) on [facility]. Report included the following information SBAR, Kardex, Intake/Output, MAR and Quality Measures. Shift Summary:  Pt is noticeably more edematous than yesterday. Pt reported that he felt\" like he was choking\"this AM. Pt repositioned and resp notified. TX admin. Pt reported that he felt better. Pt is also producing more phlegm and is suctioning frequently. TeleMD made aware. Wound care completed. VSS. Issues for Physician to Address:       Patient on Cardiac Monitoring?     [] Yes  [x] No    Rhythm:          Shift Events        Sonya Downing

## 2020-02-13 NOTE — PROGRESS NOTES
Hospitalist Progress Note    NAME: Mirian Álvarez   :  1945   MRN:  [id number]       Assessment / Plan:  Stable to be discharged to be transferred to [place] in the palliative unit     Acute on chronic respiratory failure with hypoxia, baseline 2L   Anasarca  B[place]den with CKD3  Due to Acute on chronic diastolic heart failure  PRN BiPAP  Given 1mg IV Bumex in ED without diuresis, s/p 2mg IV BID,increased to bumex 3mg q8h per nephro . Also ordered  Diuril 500mg IV once   Receive a dose of metalozone 5mg PO on   Monitor I/Os, daily weight and lytes  Appreciate Nephrology input and management   UA negative for proteinuria   Renal US showed :  1. Left kidney is within normal limits. 2. Patient is status post right nephrectomy. In the right nephrectomy bed, there  is a 4.5 cm nonspecific hypoechoic mass. 3. Urinary bladder is unremarkable. Patient changed his CODE STATUS to DNR   We will continue diuresis as per nephrology recommendation and switch to torsemide 60 mg twice daily which is home dose and give metolazone 5 mg twice weekly.   Creat stable around 1.6  Nephrology input is appreciated  -Patient now is waiting bed to be transferred to [facility] on a palliative unit   - pt decided on comfort measures per palliative note  : no more labs       Hematuria resolved   Penile lesion   uro consulted s/p  intermittent bladder irrigation and davis changed on   Hb stable , eliquis restarted     Hypervolemic Hyponatremia  -secondary to CHF  -c/w torsemide -    B/l leg redness without warmth and tenderness  No signs of cellulitis      Renal cell carcinoma s/p R Nephrectomy     Chronic A-fib, rate controlled  HTN  C/w eliquis  And BB     Chronic pain   Continue Percocet     Type 2 diabetes mellitus   lantus to 40 units, increased to 50units  due to hyperglycemia  lipsro 5units TIDAC , increase to 15units TIDAC   Correction scale      CAD (coronary artery disease)     BPH (benign prostatic hyperplasia)  Continue Hytrin and Finasteride     Hyperlipidemia   Continue statins     Gout   Continue Uloric    Morbid obesity      Code Status:  DNR  Surrogate Decision Maker: Wife     DVT Prophylaxis: Eliquis  GI Prophylaxis: not indicated     Baseline: functional      40 or above Morbid obesity / Body mass index is 51.29 kg/m². Subjective:     Chief Complaint / Reason for Physician Visit  FU CHF/hematuria/hyponatremia . C/o productive cough and secretions   Less edema overall       Review of Systems:  Symptom Y/N Comments  Symptom Y/N Comments   Fever/Chills    Chest Pain n    Poor Appetite    Edema y    Cough    Abdominal Pain n    Sputum    Joint Pain     SOB/LOMBARDI y improving   Pruritis/Rash     Nausea/vomit n   Tolerating PT/OT     Diarrhea    Tolerating Diet y    Constipation    Other             Objective:     VITALS:   Last 24hrs VS reviewed since prior progress note. Most recent are:  Patient Vitals for the past 24 hrs:   Temp Pulse Resp BP SpO2   02/13/20 0452     94 %   02/12/20 1915 98.4 °F (36.9 °C) 62 18 120/46 93 %   02/12/20 0830 98.7 °F (37.1 °C) 87 18 128/56        Intake/Output Summary (Last 24 hours) at 2/13/2020 0828  Last data filed at 2/13/2020 0553  Gross per 24 hour   Intake    Output 1050 ml   Net -1050 ml        PHYSICAL EXAM:  General: WD, WN. Alert, cooperative, no acute distress    EENT:  EOMI. Anicteric sclerae. MMM  Resp:  Diminished breath sounds b/l  , no wheezing or rales. No accessory muscle use  CV:  Regular  rhythm, LE  Edema 3+ ,   GI:  Soft, Non distended, Non tender.  +Bowel sounds  Neurologic:  Alert and oriented X 3, normal speech,   Psych:   Fair  insight. Not anxious nor agitated  Skin:  No rashes.   No jaundice, petichae on b/l legs   Penile lesion   : swollen testicles and hematuria in davis bag     Reviewed most current lab test results and cultures  YES  Reviewed most current radiology test results   YES  Review and summation of old records today NO  Reviewed patient's current orders and MAR    YES  PMH/SH reviewed - no change compared to H&P  ________________________________________________________________________  Care Plan discussed with:    Comments   Patient x    Family      RN x    Care Manager x    Consultant                       x Multidiciplinary team rounds were held today with , nursing, pharmacist and clinical coordinator. Patient's plan of care was discussed; medications were reviewed and discharge planning was addressed. ________________________________________________________________________  Total NON critical care TIME:  35  Minutes    Total CRITICAL CARE TIME Spent:   Minutes non procedure based      Comments   >50% of visit spent in counseling and coordination of care x    ________________________________________________________________________  Carmen Glez MD     Procedures: see electronic medical records for all procedures/Xrays and details which were not copied into this note but were reviewed prior to creation of Plan. LABS:  I reviewed today's most current labs and imaging studies.   Pertinent labs include:  Recent Labs     02/11/20  1055   HGB 8.4*   HCT 27.4*     Recent Labs     02/11/20  0332   *   K 3.8   CL 93*   CO2 34*   *   BUN 64*   CREA 1.67*   CA 8.9   PHOS 3.4   ALB 2.4*       Signed: Carmen Glez MD

## 2020-02-13 NOTE — PROGRESS NOTES
PIA:   1) Transfer to the [place]   2) Pt will need 2ND IM letter at time of discharge   3) Pt will need [facility] transportation at time of discharge     2:47 PM- CM spoke with Lizzy at the [place] ([phone] or [phone]) regarding the transfer status. Lizzy requested an updated MAR, CM faxed that and the hospitalist progress note from today, waiting on response. 10:46 AM- [facility] faxed updated notes (respiratory, nutrition, hospitalist, palliative and DDNR) to [phone], waiting on response. CM attempted to contact the Transfer Center, no answer and unable to leave a VM. Will try again later.      Jake Whipple, KRYSTLE, [address] [place] [address]   [phone]

## 2020-02-13 NOTE — PROGRESS NOTES
Problem: Falls - Risk of  Goal: *Absence of Falls  Description  Document Michele Bella Fall Risk and appropriate interventions in the flowsheet. Outcome: Progressing Towards Goal  Note: Fall Risk Interventions:       Mentation Interventions: Adequate sleep, hydration, pain control    Medication Interventions: Bed/chair exit alarm, Patient to call before getting OOB    Elimination Interventions: Call light in reach              Problem: Patient Education: Go to Patient Education Activity  Goal: Patient/Family Education  Outcome: Progressing Towards Goal     Problem: Pressure Injury - Risk of  Goal: *Prevention of pressure injury  Description  Document Jose Roberto Scale and appropriate interventions in the flowsheet. Outcome: Progressing Towards Goal  Note: Pressure Injury Interventions:  Sensory Interventions: Float heels, Keep linens dry and wrinkle-free, Minimize linen layers    Moisture Interventions: Absorbent underpads, Minimize layers    Activity Interventions: Assess need for specialty bed    Mobility Interventions: HOB 30 degrees or less    Nutrition Interventions: Document food/fluid/supplement intake    Friction and Shear Interventions: HOB 30 degrees or less, Minimize layers                Problem: Patient Education: Go to Patient Education Activity  Goal: Patient/Family Education  Outcome: Progressing Towards Goal     Problem:  Activity Intolerance  Goal: *Oxygen saturation during activity within specified parameters  Outcome: Progressing Towards Goal  Goal: *Able to remain out of bed as prescribed  Outcome: Progressing Towards Goal     Problem: Patient Education: Go to Patient Education Activity  Goal: Patient/Family Education  Outcome: Progressing Towards Goal     Problem: Infection - Risk of, Urinary Catheter-Associated Urinary Tract Infection  Goal: *Absence of infection signs and symptoms  Outcome: Progressing Towards Goal     Problem: Patient Education: Go to Patient Education Activity  Goal: Patient/Family Education  Outcome: Progressing Towards Goal     Problem: Patient Education: Go to Patient Education Activity  Goal: Patient/Family Education  Outcome: Progressing Towards Goal     Problem: Heart Failure: Day 1  Goal: Off Pathway (Use only if patient is Off Pathway)  Outcome: Progressing Towards Goal  Goal: Activity/Safety  Outcome: Progressing Towards Goal  Goal: Consults, if ordered  Outcome: Progressing Towards Goal  Goal: Diagnostic Test/Procedures  Outcome: Progressing Towards Goal  Goal: Nutrition/Diet  Outcome: Progressing Towards Goal  Goal: Discharge Planning  Outcome: Progressing Towards Goal  Goal: Medications  Outcome: Progressing Towards Goal  Goal: Respiratory  Outcome: Progressing Towards Goal  Goal: Treatments/Interventions/Procedures  Outcome: Progressing Towards Goal  Goal: Psychosocial  Outcome: Progressing Towards Goal  Goal: *Oxygen saturation within defined limits  Outcome: Progressing Towards Goal  Goal: *Hemodynamically stable  Outcome: Progressing Towards Goal  Goal: *Optimal pain control at patient's stated goal  Outcome: Progressing Towards Goal  Goal: *Anxiety reduced or absent  Outcome: Progressing Towards Goal     Problem: Heart Failure: Day 2  Goal: Off Pathway (Use only if patient is Off Pathway)  Outcome: Progressing Towards Goal  Goal: Activity/Safety  Outcome: Progressing Towards Goal  Goal: Consults, if ordered  Outcome: Progressing Towards Goal  Goal: Diagnostic Test/Procedures  Outcome: Progressing Towards Goal  Goal: Nutrition/Diet  Outcome: Progressing Towards Goal  Goal: Discharge Planning  Outcome: Progressing Towards Goal  Goal: Medications  Outcome: Progressing Towards Goal  Goal: Respiratory  Outcome: Progressing Towards Goal  Goal: Treatments/Interventions/Procedures  Outcome: Progressing Towards Goal  Goal: Psychosocial  Outcome: Progressing Towards Goal  Goal: *Oxygen saturation within defined limits  Outcome: Progressing Towards Goal  Goal: *Hemodynamically stable  Outcome: Progressing Towards Goal  Goal: *Optimal pain control at patient's stated goal  Outcome: Progressing Towards Goal  Goal: *Anxiety reduced or absent  Outcome: Progressing Towards Goal  Goal: *Demonstrates progressive activity  Outcome: Progressing Towards Goal     Problem: Heart Failure: Day 3  Goal: Off Pathway (Use only if patient is Off Pathway)  Outcome: Progressing Towards Goal  Goal: Activity/Safety  Outcome: Progressing Towards Goal  Goal: Diagnostic Test/Procedures  Outcome: Progressing Towards Goal  Goal: Nutrition/Diet  Outcome: Progressing Towards Goal  Goal: Discharge Planning  Outcome: Progressing Towards Goal  Goal: Medications  Outcome: Progressing Towards Goal  Goal: Respiratory  Outcome: Progressing Towards Goal  Goal: Treatments/Interventions/Procedures  Outcome: Progressing Towards Goal  Goal: Psychosocial  Outcome: Progressing Towards Goal  Goal: *Oxygen saturation within defined limits  Outcome: Progressing Towards Goal  Goal: *Hemodynamically stable  Outcome: Progressing Towards Goal  Goal: *Optimal pain control at patient's stated goal  Outcome: Progressing Towards Goal  Goal: *Anxiety reduced or absent  Outcome: Progressing Towards Goal  Goal: *Demonstrates progressive activity  Outcome: Progressing Towards Goal     Problem: Heart Failure: Day 4  Goal: Off Pathway (Use only if patient is Off Pathway)  Outcome: Progressing Towards Goal  Goal: Activity/Safety  Outcome: Progressing Towards Goal  Goal: Diagnostic Test/Procedures  Outcome: Progressing Towards Goal  Goal: Nutrition/Diet  Outcome: Progressing Towards Goal  Goal: Discharge Planning  Outcome: Progressing Towards Goal  Goal: Medications  Outcome: Progressing Towards Goal  Goal: Respiratory  Outcome: Progressing Towards Goal  Goal: Treatments/Interventions/Procedures  Outcome: Progressing Towards Goal  Goal: Psychosocial  Outcome: Progressing Towards Goal  Goal: *Oxygen saturation within defined limits  Outcome: Progressing Towards Goal  Goal: *Hemodynamically stable  Outcome: Progressing Towards Goal  Goal: *Optimal pain control at patient's stated goal  Outcome: Progressing Towards Goal  Goal: *Anxiety reduced or absent  Outcome: Progressing Towards Goal  Goal: *Demonstrates progressive activity  Outcome: Progressing Towards Goal     Problem: Heart Failure: Day 5  Goal: Off Pathway (Use only if patient is Off Pathway)  Outcome: Progressing Towards Goal  Goal: Activity/Safety  Outcome: Progressing Towards Goal  Goal: Diagnostic Test/Procedures  Outcome: Progressing Towards Goal  Goal: Nutrition/Diet  Outcome: Progressing Towards Goal  Goal: Discharge Planning  Outcome: Progressing Towards Goal  Goal: Medications  Outcome: Progressing Towards Goal  Goal: Respiratory  Outcome: Progressing Towards Goal  Goal: Treatments/Interventions/Procedures  Outcome: Progressing Towards Goal  Goal: Psychosocial  Outcome: Progressing Towards Goal     Problem: Heart Failure: Discharge Outcomes  Goal: *Demonstrates ability to perform prescribed activity without shortness of breath or discomfort  Outcome: Progressing Towards Goal  Goal: *Left ventricular function assessment completed prior to or during stay, or planned for post-discharge  Outcome: Progressing Towards Goal  Goal: *ACEI prescribed if LVEF less than 40% and no contraindications or ARB prescribed  Outcome: Progressing Towards Goal  Goal: *Verbalizes understanding and describes prescribed diet  Outcome: Progressing Towards Goal  Goal: *Verbalizes understanding/describes prescribed medications  Outcome: Progressing Towards Goal  Goal: *Describes available resources and support systems  Description  (eg: Home Health, Palliative Care, Advanced Medical Directive)  Outcome: Progressing Towards Goal  Goal: *Describes smoking cessation resources  Outcome: Progressing Towards Goal  Goal: *Understands and describes signs and symptoms to report to providers(Stroke Metric)  Outcome: Progressing Towards Goal  Goal: *Describes/verbalizes understanding of follow-up/return appt  Description  (eg: to physicians, diabetes treatment coordinator, and other resources  Outcome: Progressing Towards Goal  Goal: *Describes importance of continuing daily weights and changes to report to physician  Outcome: Progressing Towards Goal

## 2020-02-14 LAB
GLUCOSE BLD STRIP.AUTO-MCNC: 222 MG/DL (ref 65–100)
GLUCOSE BLD STRIP.AUTO-MCNC: 235 MG/DL (ref 65–100)
GLUCOSE BLD STRIP.AUTO-MCNC: 296 MG/DL (ref 65–100)
GLUCOSE BLD STRIP.AUTO-MCNC: 322 MG/DL (ref 65–100)
SERVICE CMNT-IMP: ABNORMAL

## 2020-02-14 PROCEDURE — 74011636637 HC RX REV CODE- 636/637: Performed by: INTERNAL MEDICINE

## 2020-02-14 PROCEDURE — 94640 AIRWAY INHALATION TREATMENT: CPT

## 2020-02-14 PROCEDURE — 74011000250 HC RX REV CODE- 250: Performed by: NURSE PRACTITIONER

## 2020-02-14 PROCEDURE — 74011250637 HC RX REV CODE- 250/637: Performed by: FAMILY MEDICINE

## 2020-02-14 PROCEDURE — 74011250637 HC RX REV CODE- 250/637: Performed by: NURSE PRACTITIONER

## 2020-02-14 PROCEDURE — 77010033678 HC OXYGEN DAILY

## 2020-02-14 PROCEDURE — 74011000250 HC RX REV CODE- 250: Performed by: INTERNAL MEDICINE

## 2020-02-14 PROCEDURE — 74011250637 HC RX REV CODE- 250/637: Performed by: INTERNAL MEDICINE

## 2020-02-14 PROCEDURE — 94760 N-INVAS EAR/PLS OXIMETRY 1: CPT

## 2020-02-14 PROCEDURE — 65270000015 HC RM PRIVATE ONCOLOGY

## 2020-02-14 PROCEDURE — 82962 GLUCOSE BLOOD TEST: CPT

## 2020-02-14 RX ORDER — METOLAZONE 5 MG/1
5 TABLET ORAL ONCE
Status: COMPLETED | OUTPATIENT
Start: 2020-02-15 | End: 2020-02-15

## 2020-02-14 RX ORDER — BUMETANIDE 0.25 MG/ML
1 INJECTION INTRAMUSCULAR; INTRAVENOUS ONCE
Status: COMPLETED | OUTPATIENT
Start: 2020-02-14 | End: 2020-02-14

## 2020-02-14 RX ADMIN — Medication 10 ML: at 05:39

## 2020-02-14 RX ADMIN — INSULIN LISPRO 7 UNITS: 100 INJECTION, SOLUTION INTRAVENOUS; SUBCUTANEOUS at 11:48

## 2020-02-14 RX ADMIN — INSULIN LISPRO 2 UNITS: 100 INJECTION, SOLUTION INTRAVENOUS; SUBCUTANEOUS at 22:51

## 2020-02-14 RX ADMIN — INSULIN LISPRO 15 UNITS: 100 INJECTION, SOLUTION INTRAVENOUS; SUBCUTANEOUS at 11:47

## 2020-02-14 RX ADMIN — BUMETANIDE 1 MG: 0.25 INJECTION INTRAMUSCULAR; INTRAVENOUS at 11:02

## 2020-02-14 RX ADMIN — METOPROLOL TARTRATE 6.25 MG: 25 TABLET ORAL at 11:02

## 2020-02-14 RX ADMIN — ACETAMINOPHEN 1000 MG: 500 TABLET ORAL at 11:01

## 2020-02-14 RX ADMIN — OXYCODONE 5 MG: 5 TABLET ORAL at 10:59

## 2020-02-14 RX ADMIN — GUAIFENESIN 400 MG: 200 SOLUTION ORAL at 15:08

## 2020-02-14 RX ADMIN — INSULIN GLARGINE 50 UNITS: 100 INJECTION, SOLUTION SUBCUTANEOUS at 11:00

## 2020-02-14 RX ADMIN — APIXABAN 5 MG: 5 TABLET, FILM COATED ORAL at 21:01

## 2020-02-14 RX ADMIN — ACETAMINOPHEN 1000 MG: 500 TABLET ORAL at 15:08

## 2020-02-14 RX ADMIN — APIXABAN 5 MG: 5 TABLET, FILM COATED ORAL at 11:01

## 2020-02-14 RX ADMIN — METOPROLOL TARTRATE 6.25 MG: 25 TABLET ORAL at 18:05

## 2020-02-14 RX ADMIN — TORSEMIDE 60 MG: 20 TABLET ORAL at 11:01

## 2020-02-14 RX ADMIN — ACETAMINOPHEN 1000 MG: 500 TABLET ORAL at 21:01

## 2020-02-14 RX ADMIN — GUAIFENESIN 400 MG: 200 SOLUTION ORAL at 11:01

## 2020-02-14 RX ADMIN — DOCUSATE SODIUM - SENNOSIDES 1 TABLET: 50; 8.6 TABLET, FILM COATED ORAL at 22:49

## 2020-02-14 RX ADMIN — Medication 10 ML: at 15:11

## 2020-02-14 RX ADMIN — PANTOPRAZOLE SODIUM 40 MG: 40 TABLET, DELAYED RELEASE ORAL at 05:39

## 2020-02-14 RX ADMIN — FINASTERIDE 5 MG: 5 TABLET, FILM COATED ORAL at 22:49

## 2020-02-14 RX ADMIN — IPRATROPIUM BROMIDE AND ALBUTEROL SULFATE 3 ML: .5; 3 SOLUTION RESPIRATORY (INHALATION) at 16:54

## 2020-02-14 RX ADMIN — INSULIN LISPRO 5 UNITS: 100 INJECTION, SOLUTION INTRAVENOUS; SUBCUTANEOUS at 18:05

## 2020-02-14 RX ADMIN — POLYMYXIN B SULFATE, BACITRACIN ZINC, NEOMYCIN SULFATE: 5000; 3.5; 4 OINTMENT TOPICAL at 18:06

## 2020-02-14 RX ADMIN — INSULIN LISPRO 15 UNITS: 100 INJECTION, SOLUTION INTRAVENOUS; SUBCUTANEOUS at 18:04

## 2020-02-14 RX ADMIN — GUAIFENESIN 400 MG: 200 SOLUTION ORAL at 22:48

## 2020-02-14 RX ADMIN — FEBUXOSTAT 80 MG: 40 TABLET ORAL at 11:49

## 2020-02-14 RX ADMIN — OXYCODONE 5 MG: 5 TABLET ORAL at 22:49

## 2020-02-14 RX ADMIN — OXYCODONE 5 MG: 5 TABLET ORAL at 05:39

## 2020-02-14 NOTE — PROGRESS NOTES
Oncology End of Shift Note      Bedside shift change report given to Mirella Villafana (incoming nurse) by Anjel Berrios RN (outgoing nurse) on [facility]. Report included the following information SBAR and Kardex. Shift Summary:   Pt had wound care in the morning, wife by bedside much of shift. Pt took all meds crushed in applesauce. Still no BM      Issues for Physician to Address:       Patient on Cardiac Monitoring?     [] Yes  [x] No    Rhythm:            Anjel Berrios RN

## 2020-02-14 NOTE — PROGRESS NOTES
Hospitalist Progress Note    NAME: Sonja Singh   :  1945   MRN:  [id number]       Assessment / Plan:  Stable to be discharged to be transferred to [place] in the palliative unit     Acute on chronic respiratory failure with hypoxia, baseline 2L   Anasarca  [place] with CKD3  Due to Acute on chronic diastolic heart failure  PRN BiPAP  Given 1mg IV Bumex in ED without diuresis, s/p 2mg IV BID,increased to bumex 3mg q8h per nephro . Also ordered  Diuril 500mg IV once   Receive a dose of metalozone 5mg PO on   Monitor I/Os, daily weight and lytes  Appreciate Nephrology input and management   UA negative for proteinuria   Renal US showed :  1. Left kidney is within normal limits. 2. Patient is status post right nephrectomy. In the right nephrectomy bed, there  is a 4.5 cm nonspecific hypoechoic mass. 3. Urinary bladder is unremarkable. Patient changed his CODE STATUS to DNR   We will continue diuresis as per nephrology recommendation and switch to torsemide 60 mg twice daily which is home dose and give metolazone 5 mg twice weekly. last dose of metalozone was on . Will give another dose metolazone on 02/15.  Pt got IV bumex 2mg on  and bumex 1mg on  for comfort as still have significant swelling   Creat stable around 1.6  Nephrology input is appreciated  -Patient now is waiting bed to be transferred to [facility] on a palliative unit   - pt decided on comfort measures per palliative note  : no more labs       Hematuria resolved   Penile lesion   uro consulted s/p  intermittent bladder irrigation and davis changed on   Hb stable , eliquis restarted     Hypervolemic Hyponatremia  -secondary to CHF  -c/w torsemide -    B/l leg redness without warmth and tenderness  No signs of cellulitis      Renal cell carcinoma s/p R Nephrectomy     Chronic A-fib, rate controlled  HTN  C/w eliquis  And BB     Chronic pain   Continue Percocet     Type 2 diabetes mellitus   lantus to 40 units, increased to 50units  due to hyperglycemia  lipsro 5units TIDAC , increase to 15units TIDAC   Correction scale      CAD (coronary artery disease)     BPH (benign prostatic hyperplasia)  Continue Hytrin and Finasteride     Hyperlipidemia   Continue statins     Gout   Continue Uloric    Morbid obesity      Code Status:  DNR  Surrogate Decision Maker: Wife     DVT Prophylaxis: Eliquis  GI Prophylaxis: not indicated     Baseline: functional      40 or above Morbid obesity / Body mass index is 51.16 kg/m². Subjective:     Chief Complaint / Reason for Physician Visit  FU CHF/hematuria/hyponatremia . Still with significant swelling     Review of Systems:  Symptom Y/N Comments  Symptom Y/N Comments   Fever/Chills    Chest Pain n    Poor Appetite    Edema y    Cough    Abdominal Pain n    Sputum    Joint Pain     SOB/LOMBARDI y improving   Pruritis/Rash     Nausea/vomit n   Tolerating PT/OT     Diarrhea    Tolerating Diet y    Constipation    Other             Objective:     VITALS:   Last 24hrs VS reviewed since prior progress note. Most recent are:  Patient Vitals for the past 24 hrs:   Temp Pulse Resp BP SpO2   02/14/20 0745 98.4 °F (36.9 °C) 87 18 124/45 90 %   02/13/20 2013     95 %   02/13/20 1555 98.2 °F (36.8 °C) 66 20 133/66 95 %       Intake/Output Summary (Last 24 hours) at 2/14/2020 9276  Last data filed at 2/14/2020 0547  Gross per 24 hour   Intake    Output 2400 ml   Net -2400 ml        PHYSICAL EXAM:  General: WD, WN. Alert, cooperative, no acute distress    EENT:  EOMI. Anicteric sclerae. MMM  Resp:  Diminished breath sounds b/l  , no wheezing or rales. No accessory muscle use  CV:  Regular  rhythm, LE  Edema 3+ ,   GI:  Soft, Non distended, Non tender.  +Bowel sounds  Neurologic:  Alert and oriented X 3, normal speech,   Psych:   Fair  insight. Not anxious nor agitated  Skin:  No rashes.   No jaundice, petichae on b/l legs    : swollen testicles      Reviewed most current lab test results and cultures  YES  Reviewed most current radiology test results   YES  Review and summation of old records today    NO  Reviewed patient's current orders and MAR    YES  PMH/SH reviewed - no change compared to H&P  ________________________________________________________________________  Care Plan discussed with:    Comments   Patient x    Family      RN x    Care Manager x    Consultant                       x Multidiciplinary team rounds were held today with , nursing, pharmacist and clinical coordinator. Patient's plan of care was discussed; medications were reviewed and discharge planning was addressed. ________________________________________________________________________  Total NON critical care TIME:  35  Minutes    Total CRITICAL CARE TIME Spent:   Minutes non procedure based      Comments   >50% of visit spent in counseling and coordination of care x    ________________________________________________________________________  Danuta Peña MD     Procedures: see electronic medical records for all procedures/Xrays and details which were not copied into this note but were reviewed prior to creation of Plan. LABS:  I reviewed today's most current labs and imaging studies. Pertinent labs include:  Recent Labs     02/11/20  1055   HGB 8.4*   HCT 27.4*     No results for input(s): NA, K, CL, CO2, GLU, BUN, CREA, CA, MG, PHOS, ALB, TBIL, TBILI, SGOT, ALT, INR, INREXT, INREXT in the last 72 hours.     Signed: Danuta Peña MD

## 2020-02-14 NOTE — PROGRESS NOTES
Bedside shift change report given to Marilu Villa RN (oncoming nurse) by Georgette Kaur RN (offgoing nurse). Report included the following information SBAR, Kardex, ED Summary, Procedure Summary and Recent Results.

## 2020-02-14 NOTE — PROGRESS NOTES
Palliative Medicine Consult  Jose: [phone])    Patient Name: Melba Maynard  YOB: 1945    Date of Initial Consult: 2/6/2020  Reason for Consult: End Stage Disease  Requesting Provider: Qiana Heath MD  Primary Care Physician: Brittany Patel MD     SUMMARY:   Melba Maynard is a 75 y.o. with a past history of widely metastatic renal cell carcinoma, CAD, DM, HTN, Peripheral neuropathy, and chronic a-fib, who was admitted on 2/5/2020 from home with a diagnosis of Acute on chronic respiratory failure with hypoxia and anasarca. Current medical issues leading to Palliative Medicine involvement include: goals of care discussion in setting of debility, worsening medical conditions and caregiver strain    Mr 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  He has been  to his wife Deandre for 23 years. They have no children, but Deandre has a daughter who lives in [facility]:   1. Cough  2. DNR Discussion  3. Debility  4. Frailty  5. Anorexia  6. Edema  7. Back pain (chronic)  8. Leg pain (Chronic)       PLAN:   1. Followed up with Mr [address]- He has been accepted at the [place] and the transfer should be this afternoon  2. He tells me his pain is well controlled on the current regimen, his cough is better with the addition of the nasal saline plus cough syrup, but his swelling is still bothering him. He asked if he can have another dose of IV Bumex before he goes, so I ordered a 1mg dose for today. He has a significant amount of scrotal edema as well as in his hands, legs, feet and belly. He knows that we will not get his swelling all the way gone, but he is hoping for a little reduction for comfort. 3. We talked about his overall health, and treatment vs comfort. He has curiosity about his kidney function etc, but doesn't want \"treatment\", and his curiosity isnt strong enough to want to be stuck for labs again. 4. We had a long talk about his immunotherapy- he had stopped taking it when I met with him earlier in the week but started it again yesterday. He has some questions about it, specifically, is it helping control his cancer, and therefore his symptoms, and is it contributing to why his heart and lungs are in such bad shape causing the fluid. I encouraged him to ask his oncologist questions about this, but he tells me that they will \"just want to draw more blood\" so didn't think he wanted to involve them again. He did verbalize that he was told his lung tumor was increased in size, and he knows he has mets to his bone as well, and thinks he has it in the soft tissue of his hip, so he doesn't think the pill is helping. I encouraged him to weigh that against pill burden and potential side effects when making his decision whether he wants to stop it or not  5. He is quite worried about being at the [place], as the distance from his wife will make it hard for her to get to [place]. I asked case management to look into possible transportation options for her. He tells me that this was the reason for his decision not to enroll in traditional hospice at a nursing home, because the only option he was given through his [facility] benefits was [facility], and this facility has a very poor reputation and he was not interested in spending his last days there. The only facility close to his wife was in [facility] but the [place] would not cover his room and board there  6. Goals for now are clear- he is transferring to the [place] and will continue conversations with them regarding his medications and which ones he can stop and which are contributing to his comfort  7. Initial consult note routed to primary continuity provider and/or primary health care team members  8.  Communicated plan of care with: Palliative [facility] Team     GOALS OF CARE / TREATMENT PREFERENCES:     GOALS OF CARE:  Patient/Health Care Proxy Stated Goals: Comfort    TREATMENT PREFERENCES:   Code Status: DNR    Advance Care Planning:  [x] The [facility] Interdisciplinary Team has updated the ACP Navigator with Health Care Decision Maker and Patient Capacity      Primary Decision Maker: Teetee Longo - [phone]    Advance Care Planning 2/7/2020   Patient's Healthcare Decision Maker is: Legal Next of Kin   Confirm Advance Directive None   Patient Would Like to Complete Advance Directive No       Medical Interventions: Comfort measures     Other Instructions: Other:    As far as possible, the palliative care team has discussed with patient / health care proxy about goals of care / treatment preferences for patient.      HISTORY:     History obtained from: chart, patient, wife    CHIEF COMPLAINT: none    HPI/SUBJECTIVE:    The patient is:   [x] Verbal and participatory  [] Non-participatory due to:     Patinet is more alert, conversational, in good spirits today  Persistent clearing of throat/coughing    Clinical Pain Assessment (nonverbal scale for severity on nonverbal patients):   Clinical Pain Assessment  Severity: 2  Location: legs/back  Character: aching/sharp  Duration: chronic  Factors: pain meds help some  Frequency: constant          Duration: for how long has pt been experiencing pain (e.g., 2 days, 1 month, years)  Frequency: how often pain is an issue (e.g., several times per day, once every few days, constant)     FUNCTIONAL ASSESSMENT:     Palliative Performance Scale (PPS):  PPS: 20       PSYCHOSOCIAL/SPIRITUAL SCREENING:     Palliative IDT has assessed this patient for cultural preferences / practices and a referral made as appropriate to needs (Cultural Services, Patient Advocacy, Ethics, etc.)    Any spiritual / Orthodoxy concerns:  [] Yes /  [x] No    Caregiver Burnout:  [] Yes /  [x] No /  [] No Caregiver Present      Anticipatory grief assessment:   [x] Normal  / [] Maladaptive ESAS Anxiety: Anxiety: 0    ESAS Depression: Depression: 0        REVIEW OF SYSTEMS:     Positive and pertinent negative findings in ROS are noted above in HPI. The following systems were [x] reviewed / [] unable to be reviewed as noted in HPI  Other findings are noted below. Systems: constitutional, ears/nose/mouth/throat, respiratory, gastrointestinal, genitourinary, musculoskeletal, integumentary, neurologic, psychiatric, endocrine. Positive findings noted below. Modified ESAS Completed by: provider   Fatigue: 0     Depression: 0 Pain: 2   Anxiety: 0 Nausea: 0   Anorexia: 4 Dyspnea: 2     Constipation: No              PHYSICAL EXAM:     From RN flowsheet:  Wt Readings from Last 3 Encounters:   02/14/20 (!) 366 lb 13.5 oz (166.4 kg)   01/28/14 (!) 375 lb (170.1 kg)     Blood pressure 124/45, pulse 87, temperature 98.4 °F (36.9 °C), resp. rate 18, height 5' 11\" (1.803 m), weight (!) 366 lb 13.5 oz (166.4 kg), SpO2 90 %.     Pain Scale 1: Numeric (0 - 10)  Pain Intensity 1: 5  Pain Onset 1: chronic  Pain Location 1: Hip  Pain Orientation 1: Posterior  Pain Description 1: Aching  Pain Intervention(s) 1: Medication (see MAR)  Last bowel movement, if known:     Constitutional: alert, obese but appears frail and chronically ill  Eyes: pupils equal, anicteric  ENMT: no nasal discharge, moist mucous membranes  Cardiovascular: very edematous in his legs and feet  Respiratory: breathing slightly labored,   Gastrointestinal: soft non-tender, +bowel sounds  Musculoskeletal: no deformity, no tenderness to palpation  Skin: warm, dry  Neurologic: following commands, moving all extremities  Psychiatric: full affect, no hallucinations  Other:       HISTORY:     Active Problems:    Renal cell carcinoma (HCC) (12/1/2019)      Paroxysmal A-fib ([facility] (12/1/2019)      Acute on chronic respiratory failure with hypoxia (HCC) (2/6/2020)      Anasarca (2/6/2020)      Acute on chronic diastolic heart failure ([facility] (2/6/2020) Chronic pain (2/6/2020)      HTN (hypertension) (2/6/2020)      Type 2 diabetes mellitus ([facility] (2/6/2020)      CAD (coronary artery disease) (2/6/2020)      BPH (benign prostatic hyperplasia) (2/6/2020)      Hyperlipidemia (2/6/2020)      Gout (2/6/2020)      Past Medical History:   Diagnosis Date    CAD (coronary artery disease)     Cancer (HCC)     RENAL     CKD (chronic kidney disease) stage 3, GFR 30-59 ml/min [facility]     Diabetes ([facility]     TYPE 2    Hypertension     Peripheral neuropathy     Sleep disorder     SLEEP APNEA      Past Surgical History:   Procedure Laterality Date    CARDIAC SURG PROCEDURE UNLIST      HX NEPHROSTOMY      HX ORTHOPAEDIC      2 KNEE REPLACEMENTS    IR STENT PLACEMENT        No family history on file. History reviewed, no pertinent family history.   Social History     Tobacco Use    Smoking status: Never Smoker    Smokeless tobacco: Never Used   Substance Use Topics    Alcohol use: Never     Frequency: Never     Allergies   Allergen Reactions    Allopurinol Rash    Gabapentin Other (comments)     nightmares    Paradione Hives      Current Facility-Administered Medications   Medication Dose Route Frequency    bumetanide (BUMEX) injection 1 mg  1 mg IntraVENous ONCE    cabozantinib tab 40 mg (Patient Supplied)  40 mg Oral DAILY    insulin lispro (HUMALOG) injection 15 Units  15 Units SubCUTAneous TIDAC    insulin glargine (LANTUS) injection 50 Units  50 Units SubCUTAneous DAILY    torsemide (DEMADEX) tablet 60 mg  60 mg Oral DAILY    oxyCODONE IR (ROXICODONE) tablet 5 mg  5 mg Oral Q4H PRN    acetaminophen (TYLENOL) tablet 1,000 mg  1,000 mg Oral TID    guaiFENesin (ROBITUSSIN) 100 mg/5 mL oral liquid 400 mg  400 mg Oral TID    polyethylene glycol (MIRALAX) packet 17 g  17 g Oral DAILY PRN    senna-docusate (PERICOLACE) 8.6-50 mg per tablet 1 Tab  1 Tab Oral QHS    sodium chloride (OCEAN) 0.65 % nasal squeeze bottle 2 Spray  2 Spray Both Nostrils Q2H PRN    albuterol-ipratropium (DUO-NEB) 2.5 MG-0.5 MG/3 ML  3 mL Nebulization Q6H PRN    hydrALAZINE (APRESOLINE) 20 mg/mL injection 20 mg  20 mg IntraVENous Q6H PRN    pantoprazole (PROTONIX) tablet 40 mg  40 mg Oral DAILY    metoprolol tartrate (LOPRESSOR) tablet 6.25 mg  6.25 mg Oral BID    sodium chloride (NS) flush 5-40 mL  5-40 mL IntraVENous Q8H    sodium chloride (NS) flush 5-40 mL  5-40 mL IntraVENous PRN    ondansetron (ZOFRAN) injection 4 mg  4 mg IntraVENous Q4H PRN    apixaban (ELIQUIS) tablet 5 mg  5 mg Oral Q12H    febuxostat (ULORIC) tablet 80 mg  80 mg Oral DAILY    finasteride (PROSCAR) tablet 5 mg  5 mg Oral QHS    terazosin (HYTRIN) capsule 10 mg  10 mg Oral QHS    insulin lispro (HUMALOG) injection   SubCUTAneous AC&HS    glucose chewable tablet 16 g  4 Tab Oral PRN    dextrose (D50W) injection syrg 12.5-25 g  12.5-25 g IntraVENous PRN    glucagon (GLUCAGEN) injection 1 mg  1 mg IntraMUSCular PRN    neomycin-bacitracin-polymyxin (NEOSPORIN) ointment   Topical BID          LAB AND IMAGING FINDINGS:     Lab Results   Component Value Date/Time    WBC 9.1 02/09/2020 02:58 AM    HGB 8.4 (L) 02/11/2020 10:55 AM    PLATELET 753 38/44/2277 02:58 AM     Lab Results   Component Value Date/Time    Sodium 134 (L) 02/11/2020 03:32 AM    Potassium 3.8 02/11/2020 03:32 AM    Chloride 93 (L) 02/11/2020 03:32 AM    CO2 34 (H) 02/11/2020 03:32 AM    BUN 64 (H) 02/11/2020 03:32 AM    Creatinine 1.67 (H) 02/11/2020 03:32 AM    Calcium 8.9 02/11/2020 03:32 AM    Magnesium 2.7 (H) 12/05/2019 03:30 AM    Phosphorus 3.4 02/11/2020 03:32 AM      Lab Results   Component Value Date/Time    AST (SGOT) 24 02/09/2020 02:58 AM    Alk.  phosphatase 403 (H) 02/09/2020 02:58 AM    Protein, total 5.8 (L) 02/09/2020 02:58 AM    Albumin 2.4 (L) 02/11/2020 03:32 AM    Globulin 3.4 02/09/2020 02:58 AM     No results found for: INR, PTMR, PTP, PT1, PT2, APTT, INREXT, INREXT   No results found for: IRON, FE, TIBC, IBCT, PSAT, FERR   No results found for: PH, PCO2, PO2  No components found for: GLPOC   No results found for: CPK, CKMB             Total time:   Counseling / coordination time, spent as noted above:   > 50% counseling / coordination?:     Prolonged service was provided for  []30 min   []75 min in face to face time in the presence of the patient, spent as noted above. Time Start:   Time End:   Note: this can only be billed with 06671 (initial) or 50484 (follow up). If multiple start / stop times, list each separately.

## 2020-02-14 NOTE — PROGRESS NOTES
Problem: Falls - Risk of  Goal: *Absence of Falls  Outcome: Progressing Towards Goal  Note: Fall Risk Interventions:       Mentation Interventions: Adequate sleep, hydration, pain control    Medication Interventions: Evaluate medications/consider consulting pharmacy    Elimination Interventions: Call light in reach              Problem: Falls - Risk of  Goal: *Absence of Falls  Outcome: Progressing Towards Goal  Note: Fall Risk Interventions:       Mentation Interventions: Adequate sleep, hydration, pain control    Medication Interventions: Evaluate medications/consider consulting pharmacy    Elimination Interventions: Call light in reach              Problem: Patient Education: Go to Patient Education Activity  Goal: Patient/Family Education  Outcome: Progressing Towards Goal

## 2020-02-14 NOTE — PROGRESS NOTES
PIA:   1) Transfer to the [facility] for hospice     3:00 PM- CM received a phone call from Flavio Powell at the [facility]; pt cannot come until Tuesday as they were not able to get a bariatric bed in. Monday is a federal holiday so they should have a bed first thing on Tuesday. Pt and pt's wife, RN and attending informed. 10:45 AM- CM received a phone call from Flavio Powell at the [facility]; the bariatric bed is broken so they are attempting to order a new one, waiting on return phone call. CM will updated pt, wife, RN and attending. 10:15 AM- CM received a phone call from Flavio Powell at the [facility]- they are able to accept pt today and they have the Bariatrics bed available. PIA note to follow, pt, wife, RN and attending informed.      Kindra Montiel, MSALEENA, [address]    [phone]

## 2020-02-15 LAB
GLUCOSE BLD STRIP.AUTO-MCNC: 133 MG/DL (ref 65–100)
GLUCOSE BLD STRIP.AUTO-MCNC: 179 MG/DL (ref 65–100)
GLUCOSE BLD STRIP.AUTO-MCNC: 221 MG/DL (ref 65–100)
GLUCOSE BLD STRIP.AUTO-MCNC: 236 MG/DL (ref 65–100)
SERVICE CMNT-IMP: ABNORMAL

## 2020-02-15 PROCEDURE — 74011250637 HC RX REV CODE- 250/637: Performed by: INTERNAL MEDICINE

## 2020-02-15 PROCEDURE — 74011250637 HC RX REV CODE- 250/637: Performed by: FAMILY MEDICINE

## 2020-02-15 PROCEDURE — 74011250637 HC RX REV CODE- 250/637: Performed by: NURSE PRACTITIONER

## 2020-02-15 PROCEDURE — 74011000250 HC RX REV CODE- 250: Performed by: INTERNAL MEDICINE

## 2020-02-15 PROCEDURE — 65270000015 HC RM PRIVATE ONCOLOGY

## 2020-02-15 PROCEDURE — 77010033678 HC OXYGEN DAILY

## 2020-02-15 PROCEDURE — 74011636637 HC RX REV CODE- 636/637: Performed by: INTERNAL MEDICINE

## 2020-02-15 PROCEDURE — 94760 N-INVAS EAR/PLS OXIMETRY 1: CPT

## 2020-02-15 PROCEDURE — 82962 GLUCOSE BLOOD TEST: CPT

## 2020-02-15 PROCEDURE — 94640 AIRWAY INHALATION TREATMENT: CPT

## 2020-02-15 RX ORDER — INSULIN LISPRO 100 [IU]/ML
20 INJECTION, SOLUTION INTRAVENOUS; SUBCUTANEOUS
Status: DISCONTINUED | OUTPATIENT
Start: 2020-02-15 | End: 2020-02-18 | Stop reason: HOSPADM

## 2020-02-15 RX ORDER — INSULIN GLARGINE 100 [IU]/ML
55 INJECTION, SOLUTION SUBCUTANEOUS DAILY
Status: DISCONTINUED | OUTPATIENT
Start: 2020-02-16 | End: 2020-02-18 | Stop reason: HOSPADM

## 2020-02-15 RX ADMIN — IPRATROPIUM BROMIDE AND ALBUTEROL SULFATE 3 ML: .5; 3 SOLUTION RESPIRATORY (INHALATION) at 20:19

## 2020-02-15 RX ADMIN — METOPROLOL TARTRATE 6.25 MG: 25 TABLET ORAL at 17:38

## 2020-02-15 RX ADMIN — APIXABAN 5 MG: 5 TABLET, FILM COATED ORAL at 23:07

## 2020-02-15 RX ADMIN — ACETAMINOPHEN 1000 MG: 500 TABLET ORAL at 09:48

## 2020-02-15 RX ADMIN — Medication 10 ML: at 00:48

## 2020-02-15 RX ADMIN — TERAZOSIN HYDROCHLORIDE 10 MG: 5 CAPSULE ORAL at 00:47

## 2020-02-15 RX ADMIN — Medication 10 ML: at 07:28

## 2020-02-15 RX ADMIN — TERAZOSIN HYDROCHLORIDE 10 MG: 5 CAPSULE ORAL at 23:13

## 2020-02-15 RX ADMIN — Medication 10 ML: at 23:14

## 2020-02-15 RX ADMIN — ACETAMINOPHEN 1000 MG: 500 TABLET ORAL at 16:26

## 2020-02-15 RX ADMIN — METOPROLOL TARTRATE 6.25 MG: 25 TABLET ORAL at 09:49

## 2020-02-15 RX ADMIN — INSULIN LISPRO 20 UNITS: 100 INJECTION, SOLUTION INTRAVENOUS; SUBCUTANEOUS at 17:38

## 2020-02-15 RX ADMIN — INSULIN GLARGINE 50 UNITS: 100 INJECTION, SOLUTION SUBCUTANEOUS at 10:04

## 2020-02-15 RX ADMIN — OXYCODONE 5 MG: 5 TABLET ORAL at 10:40

## 2020-02-15 RX ADMIN — GUAIFENESIN 400 MG: 200 SOLUTION ORAL at 09:50

## 2020-02-15 RX ADMIN — INSULIN LISPRO 3 UNITS: 100 INJECTION, SOLUTION INTRAVENOUS; SUBCUTANEOUS at 12:15

## 2020-02-15 RX ADMIN — METOLAZONE 5 MG: 5 TABLET ORAL at 10:40

## 2020-02-15 RX ADMIN — TORSEMIDE 60 MG: 20 TABLET ORAL at 09:47

## 2020-02-15 RX ADMIN — INSULIN LISPRO 15 UNITS: 100 INJECTION, SOLUTION INTRAVENOUS; SUBCUTANEOUS at 12:15

## 2020-02-15 RX ADMIN — OXYCODONE 5 MG: 5 TABLET ORAL at 23:13

## 2020-02-15 RX ADMIN — IPRATROPIUM BROMIDE AND ALBUTEROL SULFATE 3 ML: .5; 3 SOLUTION RESPIRATORY (INHALATION) at 10:22

## 2020-02-15 RX ADMIN — DOCUSATE SODIUM - SENNOSIDES 1 TABLET: 50; 8.6 TABLET, FILM COATED ORAL at 23:07

## 2020-02-15 RX ADMIN — Medication 10 ML: at 14:00

## 2020-02-15 RX ADMIN — PANTOPRAZOLE SODIUM 40 MG: 40 TABLET, DELAYED RELEASE ORAL at 07:28

## 2020-02-15 RX ADMIN — GUAIFENESIN 400 MG: 200 SOLUTION ORAL at 23:07

## 2020-02-15 RX ADMIN — ACETAMINOPHEN 1000 MG: 500 TABLET ORAL at 23:07

## 2020-02-15 RX ADMIN — INSULIN LISPRO 2 UNITS: 100 INJECTION, SOLUTION INTRAVENOUS; SUBCUTANEOUS at 17:38

## 2020-02-15 RX ADMIN — INSULIN LISPRO 3 UNITS: 100 INJECTION, SOLUTION INTRAVENOUS; SUBCUTANEOUS at 07:30

## 2020-02-15 RX ADMIN — FINASTERIDE 5 MG: 5 TABLET, FILM COATED ORAL at 23:07

## 2020-02-15 RX ADMIN — GUAIFENESIN 400 MG: 200 SOLUTION ORAL at 16:26

## 2020-02-15 RX ADMIN — INSULIN LISPRO 15 UNITS: 100 INJECTION, SOLUTION INTRAVENOUS; SUBCUTANEOUS at 10:04

## 2020-02-15 RX ADMIN — IPRATROPIUM BROMIDE AND ALBUTEROL SULFATE 3 ML: .5; 3 SOLUTION RESPIRATORY (INHALATION) at 02:43

## 2020-02-15 RX ADMIN — OXYCODONE 5 MG: 5 TABLET ORAL at 16:26

## 2020-02-15 RX ADMIN — POLYMYXIN B SULFATE, BACITRACIN ZINC, NEOMYCIN SULFATE: 5000; 3.5; 4 OINTMENT TOPICAL at 09:00

## 2020-02-15 RX ADMIN — APIXABAN 5 MG: 5 TABLET, FILM COATED ORAL at 09:49

## 2020-02-15 RX ADMIN — FEBUXOSTAT 80 MG: 40 TABLET ORAL at 10:40

## 2020-02-15 RX ADMIN — POLYMYXIN B SULFATE, BACITRACIN ZINC, NEOMYCIN SULFATE: 5000; 3.5; 4 OINTMENT TOPICAL at 18:00

## 2020-02-15 NOTE — PROGRESS NOTES
Oncology End of Shift Note      Bedside shift change report given to [facility] (incoming nurse) by Tory Dejesus RN (outgoing nurse) on Aftab Pemberton. Report included the following information SBAR. Shift Summary: Meds,meals, repositioning, pain control, monitor blood sugar, wound care      Issues for Physician to Address:       Patient on Cardiac Monitoring?     [] Yes  [x] No    Rhythm:                Trinity Patterson RN

## 2020-02-15 NOTE — PROGRESS NOTES
Hospitalist Progress Note    NAME: Juno Zuniga   :  1945   MRN:  [id number]       Assessment / Plan:  Stable to be discharged to be transferred to [place] in the palliative unit , awaiting bed     Acute on chronic respiratory failure with hypoxia, baseline 2L   Anasarca  [place] with CKD3  Due to Acute on chronic diastolic heart failure  PRN BiPAP  Given 1mg IV Bumex in ED without diuresis, s/p 2mg IV BID,increased to bumex 3mg q8h per nephro . Also ordered  Diuril 500mg IV once   Receive a dose of metalozone 5mg PO on   Monitor I/Os, daily weight and lytes  Appreciate Nephrology input and management   UA negative for proteinuria   Renal US showed :  1. Left kidney is within normal limits. 2. Patient is status post right nephrectomy. In the right nephrectomy bed, there  is a 4.5 cm nonspecific hypoechoic mass. 3. Urinary bladder is unremarkable. Patient changed his CODE STATUS to DNR   We will continue diuresis as per nephrology recommendation and switch to torsemide 60 mg twice daily which is home dose and give metolazone 5 mg twice weekly. last dose of metalozone was on . Will give another dose metolazone on 02/15.  Pt got IV bumex 2mg on  and bumex 1mg on  for comfort as still have significant swelling   Creat stable around 1.6  Nephrology input is appreciated  -Patient now is waiting bed to be transferred to [facility] on a palliative unit   - pt decided on comfort measures per palliative note  : no more labs       Hematuria resolved   Penile lesion   uro consulted s/p  intermittent bladder irrigation and davis changed on   Hb stable , eliquis restarted     Hypervolemic Hyponatremia  -secondary to CHF  -c/w torsemide -    B/l leg redness without warmth and tenderness  No signs of cellulitis      Renal cell carcinoma s/p R Nephrectomy     Chronic A-fib, rate controlled  HTN  C/w eliquis  And BB     Chronic pain   Continue Percocet     Type 2 diabetes mellitus   lantus increased to 55units  due to hyperglycemia  lipsro 5units TIDAC , increase to 20units [facility]   Correction scale      CAD (coronary artery disease)     BPH (benign prostatic hyperplasia)  Continue Hytrin and Finasteride     Hyperlipidemia   Continue statins     Gout   Continue Uloric    Morbid obesity      Code Status:  DNR  Surrogate Decision Maker: Wife     DVT Prophylaxis: Eliquis  GI Prophylaxis: not indicated     Baseline: functional      40 or above Morbid obesity / Body mass index is 51.16 kg/m². Subjective:     Chief Complaint / Reason for Physician Visit  FU CHF/hematuria/hyponatremia . C/o swelling and some weeping from his upper arms   Review of Systems:  Symptom Y/N Comments  Symptom Y/N Comments   Fever/Chills    Chest Pain n    Poor Appetite    Edema y    Cough    Abdominal Pain n    Sputum    Joint Pain     SOB/LOMBARDI y improving   Pruritis/Rash     Nausea/vomit n   Tolerating PT/OT     Diarrhea    Tolerating Diet y    Constipation    Other             Objective:     VITALS:   Last 24hrs VS reviewed since prior progress note. Most recent are:  Patient Vitals for the past 24 hrs:   Temp Pulse Resp BP SpO2   02/15/20 0243     97 %   02/14/20 1950 98.4 °F (36.9 °C) 67 18 141/54 99 %   02/14/20 1844  64 18 154/60 97 %   02/14/20 1654     90 %       Intake/Output Summary (Last 24 hours) at 2/15/2020 9238  Last data filed at 2/15/2020 0400  Gross per 24 hour   Intake 480 ml   Output 2600 ml   Net -2120 ml        PHYSICAL EXAM:  General: WD, WN. Alert, cooperative, no acute distress    EENT:  EOMI. Anicteric sclerae. MMM  Resp:  Diminished breath sounds b/l  , no wheezing or rales. No accessory muscle use  CV:  Regular  rhythm, LE  Edema 3+ ,   GI:  Soft, Non distended, Non tender.  +Bowel sounds  Neurologic:  Alert and oriented X 3, normal speech,   Psych:   Fair  insight. Not anxious nor agitated  Skin:  No rashes.   No jaundice, petichae on b/l legs    : swollen testicles      Reviewed most current lab test results and cultures  YES  Reviewed most current radiology test results   YES  Review and summation of old records today    NO  Reviewed patient's current orders and MAR    YES  PMH/SH reviewed - no change compared to H&P  ________________________________________________________________________  Care Plan discussed with:    Comments   Patient x    Family  x Wife at bedside    RN x    Care Manager x    Consultant                       x Multidiciplinary team rounds were held today with , nursing, pharmacist and clinical coordinator. Patient's plan of care was discussed; medications were reviewed and discharge planning was addressed. ________________________________________________________________________  Total NON critical care TIME:  25  Minutes    Total CRITICAL CARE TIME Spent:   Minutes non procedure based      Comments   >50% of visit spent in counseling and coordination of care x    ________________________________________________________________________  Jyotsna Espinoza MD     Procedures: see electronic medical records for all procedures/Xrays and details which were not copied into this note but were reviewed prior to creation of Plan. LABS:  I reviewed today's most current labs and imaging studies. Pertinent labs include:  No results for input(s): WBC, HGB, HCT, PLT, HGBEXT, HCTEXT, PLTEXT, HGBEXT, HCTEXT, PLTEXT in the last 72 hours. No results for input(s): NA, K, CL, CO2, GLU, BUN, CREA, CA, MG, PHOS, ALB, TBIL, TBILI, SGOT, ALT, INR, INREXT, INREXT in the last 72 hours.     Signed: Jyotsna Espinoza MD

## 2020-02-15 NOTE — PROGRESS NOTES
Oncology End of Shift Note      Bedside shift change report given to Eugene Saavedra RN (incoming nurse) by Kip Mitchell (outgoing nurse) on [facility]. Report included the following information SBAR, Kardex, Intake/Output and MAR. Shift Summary: Pt remained stable throughout shift. Scheduled meds given, PRN meds given for pain. Wound care done, hourly rounding completed, pt turned q2h, pt has davis. Issues for Physician to Address:       Patient on Cardiac Monitoring?     [] Yes  [x] No    Rhythm:        Kip Mitchell

## 2020-02-15 NOTE — PROGRESS NOTES
Problem: Falls - Risk of  Goal: *Absence of Falls  Description  Document Christopher Reas Fall Risk and appropriate interventions in the flowsheet. Outcome: Progressing Towards Goal  Note: Fall Risk Interventions:       Mentation Interventions: Adequate sleep, hydration, pain control, Door open when patient unattended    Medication Interventions: Evaluate medications/consider consulting pharmacy    Elimination Interventions: Call light in reach              Problem: Pressure Injury - Risk of  Goal: *Prevention of pressure injury  Description  Document Jose Roberto Scale and appropriate interventions in the flowsheet.   Note: Pressure Injury Interventions:  Sensory Interventions: Check visual cues for pain    Moisture Interventions: Internal/External urinary devices    Activity Interventions: Pressure redistribution bed/mattress(bed type)    Mobility Interventions: HOB 30 degrees or less    Nutrition Interventions: Document food/fluid/supplement intake    Friction and Shear Interventions: HOB 30 degrees or less                Problem: Breathing Pattern - Ineffective  Goal: *Absence of hypoxia  Outcome: Progressing Towards Goal  Goal: *Use of effective breathing techniques  Outcome: Progressing Towards Goal  Goal: *PALLIATIVE CARE:  Alleviation of Dyspnea  Outcome: Progressing Towards Goal

## 2020-02-15 NOTE — PROGRESS NOTES
Oncology End of Shift Note      Bedside shift change report given to Liz Libman RN (incoming nurse) by Sudheer hCavez (outgoing nurse) on [facility]. Report included the following information SBAR, Kardex, Intake/Output and MAR. Shift Summary: slept only part of the night, does not want to have labs drawn anymore (is on comfort measures per palliative note), woundcare done, folley care done, turned    Issues for Physician to Address:       Patient on Cardiac Monitoring?     [] Yes  [x] No    Rhythm:          Shift Events    Sudheer Chavez

## 2020-02-16 LAB
GLUCOSE BLD STRIP.AUTO-MCNC: 141 MG/DL (ref 65–100)
GLUCOSE BLD STRIP.AUTO-MCNC: 228 MG/DL (ref 65–100)
GLUCOSE BLD STRIP.AUTO-MCNC: 246 MG/DL (ref 65–100)
GLUCOSE BLD STRIP.AUTO-MCNC: 94 MG/DL (ref 65–100)
SERVICE CMNT-IMP: ABNORMAL
SERVICE CMNT-IMP: NORMAL

## 2020-02-16 PROCEDURE — 74011636637 HC RX REV CODE- 636/637: Performed by: INTERNAL MEDICINE

## 2020-02-16 PROCEDURE — 94760 N-INVAS EAR/PLS OXIMETRY 1: CPT

## 2020-02-16 PROCEDURE — 74011000250 HC RX REV CODE- 250: Performed by: INTERNAL MEDICINE

## 2020-02-16 PROCEDURE — 74011250637 HC RX REV CODE- 250/637: Performed by: INTERNAL MEDICINE

## 2020-02-16 PROCEDURE — 77010033678 HC OXYGEN DAILY

## 2020-02-16 PROCEDURE — 74011250637 HC RX REV CODE- 250/637: Performed by: FAMILY MEDICINE

## 2020-02-16 PROCEDURE — 82962 GLUCOSE BLOOD TEST: CPT

## 2020-02-16 PROCEDURE — 74011250637 HC RX REV CODE- 250/637: Performed by: NURSE PRACTITIONER

## 2020-02-16 PROCEDURE — 65270000015 HC RM PRIVATE ONCOLOGY

## 2020-02-16 PROCEDURE — 94640 AIRWAY INHALATION TREATMENT: CPT

## 2020-02-16 RX ORDER — GUAIFENESIN 100 MG/5ML
400 SOLUTION ORAL
Status: DISCONTINUED | OUTPATIENT
Start: 2020-02-16 | End: 2020-02-18 | Stop reason: HOSPADM

## 2020-02-16 RX ORDER — DEXTROMETHORPHAN POLISTIREX 30 MG/5 ML
SUSPENSION, EXTENDED RELEASE 12 HR ORAL AS NEEDED
Status: DISCONTINUED | OUTPATIENT
Start: 2020-02-16 | End: 2020-02-18 | Stop reason: HOSPADM

## 2020-02-16 RX ADMIN — Medication 10 ML: at 14:00

## 2020-02-16 RX ADMIN — INSULIN LISPRO 20 UNITS: 100 INJECTION, SOLUTION INTRAVENOUS; SUBCUTANEOUS at 10:15

## 2020-02-16 RX ADMIN — POLYETHYLENE GLYCOL 3350 17 G: 17 POWDER, FOR SOLUTION ORAL at 04:41

## 2020-02-16 RX ADMIN — MINERAL OIL: 100 ENEMA RECTAL at 08:00

## 2020-02-16 RX ADMIN — APIXABAN 5 MG: 5 TABLET, FILM COATED ORAL at 23:26

## 2020-02-16 RX ADMIN — INSULIN LISPRO 20 UNITS: 100 INJECTION, SOLUTION INTRAVENOUS; SUBCUTANEOUS at 17:17

## 2020-02-16 RX ADMIN — FEBUXOSTAT 80 MG: 40 TABLET ORAL at 10:15

## 2020-02-16 RX ADMIN — Medication 10 ML: at 23:28

## 2020-02-16 RX ADMIN — PANTOPRAZOLE SODIUM 40 MG: 40 TABLET, DELAYED RELEASE ORAL at 06:00

## 2020-02-16 RX ADMIN — INSULIN LISPRO 3 UNITS: 100 INJECTION, SOLUTION INTRAVENOUS; SUBCUTANEOUS at 10:15

## 2020-02-16 RX ADMIN — FINASTERIDE 5 MG: 5 TABLET, FILM COATED ORAL at 23:26

## 2020-02-16 RX ADMIN — ACETAMINOPHEN 1000 MG: 500 TABLET ORAL at 17:17

## 2020-02-16 RX ADMIN — TERAZOSIN HYDROCHLORIDE 10 MG: 5 CAPSULE ORAL at 23:26

## 2020-02-16 RX ADMIN — INSULIN LISPRO 2 UNITS: 100 INJECTION, SOLUTION INTRAVENOUS; SUBCUTANEOUS at 16:30

## 2020-02-16 RX ADMIN — APIXABAN 5 MG: 5 TABLET, FILM COATED ORAL at 10:15

## 2020-02-16 RX ADMIN — METOPROLOL TARTRATE 6.25 MG: 25 TABLET ORAL at 10:15

## 2020-02-16 RX ADMIN — INSULIN GLARGINE 55 UNITS: 100 INJECTION, SOLUTION SUBCUTANEOUS at 10:15

## 2020-02-16 RX ADMIN — POLYMYXIN B SULFATE, BACITRACIN ZINC, NEOMYCIN SULFATE: 5000; 3.5; 4 OINTMENT TOPICAL at 18:00

## 2020-02-16 RX ADMIN — METOPROLOL TARTRATE 6.25 MG: 25 TABLET ORAL at 17:17

## 2020-02-16 RX ADMIN — Medication 10 ML: at 06:00

## 2020-02-16 RX ADMIN — DOCUSATE SODIUM - SENNOSIDES 1 TABLET: 50; 8.6 TABLET, FILM COATED ORAL at 23:26

## 2020-02-16 RX ADMIN — OXYCODONE 5 MG: 5 TABLET ORAL at 21:03

## 2020-02-16 RX ADMIN — POLYMYXIN B SULFATE, BACITRACIN ZINC, NEOMYCIN SULFATE: 5000; 3.5; 4 OINTMENT TOPICAL at 10:15

## 2020-02-16 RX ADMIN — INSULIN LISPRO 20 UNITS: 100 INJECTION, SOLUTION INTRAVENOUS; SUBCUTANEOUS at 12:30

## 2020-02-16 RX ADMIN — TORSEMIDE 60 MG: 20 TABLET ORAL at 10:15

## 2020-02-16 RX ADMIN — IPRATROPIUM BROMIDE AND ALBUTEROL SULFATE 3 ML: .5; 3 SOLUTION RESPIRATORY (INHALATION) at 02:07

## 2020-02-16 RX ADMIN — GUAIFENESIN 400 MG: 200 SOLUTION ORAL at 06:00

## 2020-02-16 RX ADMIN — ACETAMINOPHEN 1000 MG: 500 TABLET ORAL at 10:15

## 2020-02-16 RX ADMIN — INSULIN LISPRO 3 UNITS: 100 INJECTION, SOLUTION INTRAVENOUS; SUBCUTANEOUS at 12:30

## 2020-02-16 RX ADMIN — ACETAMINOPHEN 1000 MG: 500 TABLET ORAL at 23:27

## 2020-02-16 RX ADMIN — IPRATROPIUM BROMIDE AND ALBUTEROL SULFATE 3 ML: .5; 3 SOLUTION RESPIRATORY (INHALATION) at 16:17

## 2020-02-16 RX ADMIN — IPRATROPIUM BROMIDE AND ALBUTEROL SULFATE 3 ML: .5; 3 SOLUTION RESPIRATORY (INHALATION) at 11:26

## 2020-02-16 NOTE — PROGRESS NOTES
Oncology End of Shift Note      Bedside shift change report given to  TARIK Briggs (incoming nurse) by Randi Larose (outgoing nurse) on [facility]. Report included the following information SBAR, Kardex, Intake/Output and MAR. Shift Summary: woundcare done on foot and scrotum, slept part of the night, requested his Roxicodone once, requested his robitussin early due to coughing (telehospitalist put in a prn order for q 6 hr, robitussin given), stated he was constipated, given prn miralex with prune juice and telehospitalist put in order for prn fleet enema. Issues for Physician to Address:      Patient on Cardiac Monitoring?     [] Yes  [x] No    Rhythm:          Shift Events      Randi Larose

## 2020-02-16 NOTE — PROGRESS NOTES
Hospitalist Progress Note    NAME: Kristen Reed   :  1945   MRN:  [id number]       Assessment / Plan:  Stable to be discharged to be transferred to [place] in the palliative unit , awaiting bed     Acute on chronic respiratory failure with hypoxia, baseline 2L   Anasarca  Worsening  CKD3  Due to Acute on chronic diastolic heart failure  PRN BiPAP  Given 1mg IV Bumex in ED without diuresis, s/p 2mg IV BID,increased to bumex 3mg q8h per nephro . Also ordered  Diuril 500mg IV once   Receive a dose of metalozone 5mg PO on   Monitor I/Os, daily weight and lytes  Appreciate Nephrology input and management   UA negative for proteinuria   Renal US showed :  1. Left kidney is within normal limits. 2. Patient is status post right nephrectomy. In the right nephrectomy bed, there  is a 4.5 cm nonspecific hypoechoic mass. 3. Urinary bladder is unremarkable. Patient changed his CODE STATUS to DNR   We will continue diuresis as per nephrology recommendation and switch to torsemide 60 mg twice daily which is home dose and give metolazone 5 mg twice weekly. last dose of metalozone was on . Will give another dose metolazone on 02/15. Pt got IV bumex 2mg on  and bumex 1mg on  for comfort as still have significant swelling   Creat stable around 1.6  Nephrology input is appreciated  -Patient now is waiting bed to be transferred to [place] [id number] on a palliative unit   - pt decided on comfort measures per palliative note  : no more labs      Constipation  No BM xfew dys now, on laxatives , s/p fleet enem .  Ordered soap studs enema      Hematuria resolved   Penile lesion   uro consulted s/p  intermittent bladder irrigation and davis changed on   Hb stable , eliquis restarted     Hypervolemic Hyponatremia  -secondary to CHF  -c/w torsemide -    B/l leg redness without warmth and tenderness  No signs of cellulitis      Renal cell carcinoma s/p R Nephrectomy     Chronic A-fib, rate controlled  HTN  C/w eliquis  And BB     Chronic pain   Continue Percocet     Type 2 diabetes mellitus   lantus  increased to 55units  due to hyperglycemia  lipsro 5units TIDAC , increase to 20units TIDAC   Correction scale      CAD (coronary artery disease)     BPH (benign prostatic hyperplasia)  Continue Hytrin and Finasteride     Hyperlipidemia   Continue statins     Gout   Continue Uloric    Morbid obesity      Code Status:  DNR  Surrogate Decision Maker: Wife     DVT Prophylaxis: Eliquis  GI Prophylaxis: not indicated     Baseline: functional      40 or above Morbid obesity / Body mass index is 51.44 kg/m². Subjective:     Chief Complaint / Reason for Physician Visit  FU CHF/hematuria/hyponatremia .c/o constipation and request manual melinda impaction   Review of Systems:  Symptom Y/N Comments  Symptom Y/N Comments   Fever/Chills    Chest Pain n    Poor Appetite    Edema y    Cough    Abdominal Pain n    Sputum    Joint Pain     SOB/LOMBARDI y improving   Pruritis/Rash     Nausea/vomit n   Tolerating PT/OT     Diarrhea    Tolerating Diet y    Constipation    Other             Objective:     VITALS:   Last 24hrs VS reviewed since prior progress note. Most recent are:  Patient Vitals for the past 24 hrs:   Temp Pulse Resp BP SpO2   02/16/20 0207     97 %   02/15/20 2019     93 %   02/15/20 1957 97.7 °F (36.5 °C) 77 20 154/81    02/15/20 1022     94 %   02/15/20 0900 97.6 °F (36.4 °C) 80 22 141/61 91 %       Intake/Output Summary (Last 24 hours) at 2/16/2020 5708  Last data filed at 2/15/2020 2030  Gross per 24 hour   Intake 240 ml   Output 1350 ml   Net -1110 ml        PHYSICAL EXAM:  General: WD, WN. Alert, cooperative, no acute distress    EENT:  EOMI. Anicteric sclerae. MMM  Resp:  Diminished breath sounds b/l  , no wheezing or rales.   No accessory muscle use  CV:  Regular  rhythm, LE  Edema 3+ ,   GI:  Soft, Non distended, Non tender.  +Bowel sounds  Neurologic:  Alert and oriented X 3, normal speech,   Psych:   Fair insight. Not anxious nor agitated  Skin:  No rashes. No jaundice, petichae on b/l legs    : swollen testicles      Reviewed most current lab test results and cultures  YES  Reviewed most current radiology test results   YES  Review and summation of old records today    NO  Reviewed patient's current orders and MAR    YES  PMH/SH reviewed - no change compared to H&P  ________________________________________________________________________  Care Plan discussed with:    Comments   Patient x    Family      RN x    Care Manager x    Consultant                       x Multidiciplinary team rounds were held today with , nursing, pharmacist and clinical coordinator. Patient's plan of care was discussed; medications were reviewed and discharge planning was addressed. ________________________________________________________________________  Total NON critical care TIME:  25  Minutes    Total CRITICAL CARE TIME Spent:   Minutes non procedure based      Comments   >50% of visit spent in counseling and coordination of care x    ________________________________________________________________________  Juany Kim MD     Procedures: see electronic medical records for all procedures/Xrays and details which were not copied into this note but were reviewed prior to creation of Plan. LABS:  I reviewed today's most current labs and imaging studies. Pertinent labs include:  No results for input(s): WBC, HGB, HCT, PLT, HGBEXT, HCTEXT, PLTEXT, HGBEXT, HCTEXT, PLTEXT in the last 72 hours. No results for input(s): NA, K, CL, CO2, GLU, BUN, CREA, CA, MG, PHOS, ALB, TBIL, TBILI, SGOT, ALT, INR, INREXT, INREXT in the last 72 hours.     Signed: Juany Kim MD

## 2020-02-16 NOTE — PROGRESS NOTES
Problem: Falls - Risk of  Goal: *Absence of Falls  Description  Document Danellesophia  Fall Risk and appropriate interventions in the flowsheet.   Outcome: Progressing Towards Goal  Note: Fall Risk Interventions:       Mentation Interventions: Adequate sleep, hydration, pain control    Medication Interventions: Evaluate medications/consider consulting pharmacy    Elimination Interventions: Call light in reach

## 2020-02-16 NOTE — PROGRESS NOTES
Problem: Falls - Risk of  Goal: *Absence of Falls  Description  Document Candelaria Mcgill Fall Risk and appropriate interventions in the flowsheet. Outcome: Progressing Towards Goal  Note: Fall Risk Interventions:       Mentation Interventions: Adequate sleep, hydration, pain control, Door open when patient unattended    Medication Interventions: Evaluate medications/consider consulting pharmacy    Elimination Interventions: Call light in reach              Problem: Pressure Injury - Risk of  Goal: *Prevention of pressure injury  Description  Document Jose Roberto Scale and appropriate interventions in the flowsheet. Outcome: Progressing Towards Goal  Note: Pressure Injury Interventions:  Sensory Interventions: Keep linens dry and wrinkle-free, Minimize linen layers    Moisture Interventions: Internal/External urinary devices    Activity Interventions: Pressure redistribution bed/mattress(bed type)    Mobility Interventions: HOB 30 degrees or less    Nutrition Interventions: Document food/fluid/supplement intake    Friction and Shear Interventions: HOB 30 degrees or less                Problem:  Activity Intolerance  Goal: *Oxygen saturation during activity within specified parameters  Outcome: Progressing Towards Goal     Problem: Infection - Risk of, Urinary Catheter-Associated Urinary Tract Infection  Goal: *Absence of infection signs and symptoms  Outcome: Progressing Towards Goal

## 2020-02-17 LAB
GLUCOSE BLD STRIP.AUTO-MCNC: 164 MG/DL (ref 65–100)
GLUCOSE BLD STRIP.AUTO-MCNC: 174 MG/DL (ref 65–100)
GLUCOSE BLD STRIP.AUTO-MCNC: 74 MG/DL (ref 65–100)
GLUCOSE BLD STRIP.AUTO-MCNC: 89 MG/DL (ref 65–100)
SERVICE CMNT-IMP: ABNORMAL
SERVICE CMNT-IMP: ABNORMAL
SERVICE CMNT-IMP: NORMAL
SERVICE CMNT-IMP: NORMAL

## 2020-02-17 PROCEDURE — 82962 GLUCOSE BLOOD TEST: CPT

## 2020-02-17 PROCEDURE — 74011250637 HC RX REV CODE- 250/637: Performed by: NURSE PRACTITIONER

## 2020-02-17 PROCEDURE — 74011250637 HC RX REV CODE- 250/637: Performed by: INTERNAL MEDICINE

## 2020-02-17 PROCEDURE — 74011636637 HC RX REV CODE- 636/637: Performed by: INTERNAL MEDICINE

## 2020-02-17 PROCEDURE — 94640 AIRWAY INHALATION TREATMENT: CPT

## 2020-02-17 PROCEDURE — 74011250637 HC RX REV CODE- 250/637: Performed by: FAMILY MEDICINE

## 2020-02-17 PROCEDURE — 74011000250 HC RX REV CODE- 250: Performed by: INTERNAL MEDICINE

## 2020-02-17 PROCEDURE — 77010033678 HC OXYGEN DAILY

## 2020-02-17 PROCEDURE — 94760 N-INVAS EAR/PLS OXIMETRY 1: CPT

## 2020-02-17 PROCEDURE — 65270000015 HC RM PRIVATE ONCOLOGY

## 2020-02-17 RX ADMIN — Medication 10 ML: at 14:12

## 2020-02-17 RX ADMIN — APIXABAN 5 MG: 5 TABLET, FILM COATED ORAL at 21:24

## 2020-02-17 RX ADMIN — METOPROLOL TARTRATE 6.25 MG: 25 TABLET ORAL at 18:00

## 2020-02-17 RX ADMIN — IPRATROPIUM BROMIDE AND ALBUTEROL SULFATE 3 ML: .5; 3 SOLUTION RESPIRATORY (INHALATION) at 04:58

## 2020-02-17 RX ADMIN — DOCUSATE SODIUM - SENNOSIDES 1 TABLET: 50; 8.6 TABLET, FILM COATED ORAL at 21:24

## 2020-02-17 RX ADMIN — ACETAMINOPHEN 1000 MG: 500 TABLET ORAL at 21:24

## 2020-02-17 RX ADMIN — ACETAMINOPHEN 1000 MG: 500 TABLET ORAL at 15:40

## 2020-02-17 RX ADMIN — POLYMYXIN B SULFATE, BACITRACIN ZINC, NEOMYCIN SULFATE: 5000; 3.5; 4 OINTMENT TOPICAL at 18:00

## 2020-02-17 RX ADMIN — INSULIN LISPRO 2 UNITS: 100 INJECTION, SOLUTION INTRAVENOUS; SUBCUTANEOUS at 11:30

## 2020-02-17 RX ADMIN — OXYCODONE 5 MG: 5 TABLET ORAL at 04:42

## 2020-02-17 RX ADMIN — TORSEMIDE 60 MG: 20 TABLET ORAL at 09:08

## 2020-02-17 RX ADMIN — INSULIN LISPRO 20 UNITS: 100 INJECTION, SOLUTION INTRAVENOUS; SUBCUTANEOUS at 16:30

## 2020-02-17 RX ADMIN — INSULIN LISPRO 2 UNITS: 100 INJECTION, SOLUTION INTRAVENOUS; SUBCUTANEOUS at 08:29

## 2020-02-17 RX ADMIN — POLYMYXIN B SULFATE, BACITRACIN ZINC, NEOMYCIN SULFATE: 5000; 3.5; 4 OINTMENT TOPICAL at 09:09

## 2020-02-17 RX ADMIN — FEBUXOSTAT 80 MG: 40 TABLET ORAL at 09:09

## 2020-02-17 RX ADMIN — TERAZOSIN HYDROCHLORIDE 10 MG: 5 CAPSULE ORAL at 21:27

## 2020-02-17 RX ADMIN — Medication 10 ML: at 06:41

## 2020-02-17 RX ADMIN — Medication 10 ML: at 21:28

## 2020-02-17 RX ADMIN — FINASTERIDE 5 MG: 5 TABLET, FILM COATED ORAL at 21:24

## 2020-02-17 RX ADMIN — POLYETHYLENE GLYCOL 3350 17 G: 17 POWDER, FOR SOLUTION ORAL at 14:10

## 2020-02-17 RX ADMIN — METOPROLOL TARTRATE 6.25 MG: 25 TABLET ORAL at 09:06

## 2020-02-17 RX ADMIN — IPRATROPIUM BROMIDE AND ALBUTEROL SULFATE 3 ML: .5; 3 SOLUTION RESPIRATORY (INHALATION) at 19:46

## 2020-02-17 RX ADMIN — OXYCODONE 5 MG: 5 TABLET ORAL at 09:13

## 2020-02-17 RX ADMIN — GUAIFENESIN 400 MG: 200 SOLUTION ORAL at 04:45

## 2020-02-17 RX ADMIN — ACETAMINOPHEN 1000 MG: 500 TABLET ORAL at 09:07

## 2020-02-17 RX ADMIN — INSULIN LISPRO 20 UNITS: 100 INJECTION, SOLUTION INTRAVENOUS; SUBCUTANEOUS at 08:29

## 2020-02-17 RX ADMIN — INSULIN LISPRO 20 UNITS: 100 INJECTION, SOLUTION INTRAVENOUS; SUBCUTANEOUS at 11:30

## 2020-02-17 RX ADMIN — OXYCODONE 5 MG: 5 TABLET ORAL at 15:40

## 2020-02-17 RX ADMIN — INSULIN GLARGINE 55 UNITS: 100 INJECTION, SOLUTION SUBCUTANEOUS at 08:29

## 2020-02-17 RX ADMIN — APIXABAN 5 MG: 5 TABLET, FILM COATED ORAL at 09:08

## 2020-02-17 RX ADMIN — PANTOPRAZOLE SODIUM 40 MG: 40 TABLET, DELAYED RELEASE ORAL at 06:41

## 2020-02-17 NOTE — PROGRESS NOTES
Hospitalist Progress Note    NAME: Melba Maynard   :  1945   MRN:  [id number]   Interim summary  This is a 70-year-old male [place] patient with history of metastatic renal carcinoma who was admitted initially for acute on chronic respiratory failure with hypoxia secondary to acute on chronic diastolic CHF with anasarca. Received few days of IV Bumex along with metolazone without complete relief of the anasarca. Patient still requiring 5 L of oxygen. After discussion with palliative care team patient decided on comfort measures . Patient is being transferred to [facility] on   in the palliative care unit. Patient is currently on no additional labs and no aggressive measures  Assessment / Plan:  Stable to be discharged to be transferred to [place] in the palliative unit , awaiting bed     Acute on chronic respiratory failure with hypoxia, baseline 2L   Anasarca  Worsening  CKD3  Due to Acute on chronic diastolic heart failure  PRN BiPAP  Given 1mg IV Bumex in ED without diuresis, s/p 2mg IV BID,increased to bumex 3mg q8h per nephro . Also ordered  Diuril 500mg IV once   Receive a dose of metalozone 5mg PO on   Monitor I/Os, daily weight and lytes  Appreciate Nephrology input and management   UA negative for proteinuria   Renal US showed :  1. Left kidney is within normal limits. 2. Patient is status post right nephrectomy. In the right nephrectomy bed, there  is a 4.5 cm nonspecific hypoechoic mass. 3. Urinary bladder is unremarkable. Patient changed his CODE STATUS to DNR   We will continue diuresis as per nephrology recommendation and switch to torsemide 60 mg twice daily which is home dose and give metolazone 5 mg twice weekly. last dose of metalozone was on . Will give another dose metolazone on 02/15.  Pt got IV bumex 2mg on  and bumex 1mg on  for comfort as still have significant swelling   Creat stable around 1.6  Nephrology input is appreciated  -Patient now is waiting bed to be [place] palliative unit   - pt decided on comfort measures per palliative note  : no more labs      Constipation  No BM xfew dys now, on laxatives , s/p fleet enema and soap studs enema   Finally had a large BM after enemas      Hematuria resolved   Penile lesion   uro consulted s/p  intermittent bladder irrigation and davis changed on 02/06  Hb stable , eliquis restarted   Remained on davis for comfort     Hypervolemic Hyponatremia  -secondary to CHF  -c/w torsemide -    B/l leg redness without warmth and tenderness  No signs of cellulitis      Renal cell carcinoma s/p R Nephrectomy     Chronic A-fib, rate controlled  HTN  C/w eliquis  And BB     Chronic pain   Continue Percocet     Type 2 diabetes mellitus   C/w lantus at  55units and  lipsro  20units TIDAC   +SSI      CAD (coronary artery disease)     BPH (benign prostatic hyperplasia)  Continue Hytrin and Finasteride     Hyperlipidemia   Continue statins     Gout   Continue Uloric    Morbid obesity      Code Status:  DNR  Surrogate Decision Maker: Wife     DVT Prophylaxis: Eliquis  GI Prophylaxis: not indicated     Baseline: functional      40 or above Morbid obesity / Body mass index is 51.44 kg/m². Subjective:     Chief Complaint / Reason for Physician Visit  FU CHF/hematuria/hyponatremia . no acute issues , still with some cough but non productive   Review of Systems:  Symptom Y/N Comments  Symptom Y/N Comments   Fever/Chills    Chest Pain n    Poor Appetite    Edema y    Cough    Abdominal Pain n    Sputum    Joint Pain     SOB/LOMBARDI y   Pruritis/Rash     Nausea/vomit n   Tolerating PT/OT     Diarrhea    Tolerating Diet y    Constipation    Other             Objective:     VITALS:   Last 24hrs VS reviewed since prior progress note.  Most recent are:  Patient Vitals for the past 24 hrs:   Temp Pulse Resp BP SpO2   02/17/20 0459     96 %   02/16/20 2256 98.2 °F (36.8 °C) 82 20 138/68 95 %   02/16/20 1617     94 %   02/16/20 1543 98.4 °F (36.9 °C) 78 20 153/68 91 %   02/16/20 1126     93 %   02/16/20 0919 98.1 °F (36.7 °C) 83 22 150/73 94 %       Intake/Output Summary (Last 24 hours) at 2/17/2020 0736  Last data filed at 2/17/2020 1226  Gross per 24 hour   Intake 240 ml   Output 925 ml   Net -685 ml        PHYSICAL EXAM:  General: WD, WN. Alert, cooperative, no acute distress    EENT:  EOMI. Anicteric sclerae. MMM  Resp:  Diminished breath sounds b/l  , no wheezing or rales. No accessory muscle use  CV:  Regular  rhythm, LE  Edema 3+ ,   GI:  Soft, Non distended, Non tender.  +Bowel sounds  Neurologic:  Alert and oriented X 3, normal speech,   Psych:   Fair  insight. Not anxious nor agitated  Skin:  No rashes. No jaundice, petichae on b/l legs    : swollen testicles      Reviewed most current lab test results and cultures  YES  Reviewed most current radiology test results   YES  Review and summation of old records today    NO  Reviewed patient's current orders and MAR    YES  PMH/ reviewed - no change compared to H&P  ________________________________________________________________________  Care Plan discussed with:    Comments   Patient x    Family      RN x    Care Manager x    Consultant                       x Multidiciplinary team rounds were held today with , nursing, pharmacist and clinical coordinator. Patient's plan of care was discussed; medications were reviewed and discharge planning was addressed.      ________________________________________________________________________  Total NON critical care TIME:  25  Minutes    Total CRITICAL CARE TIME Spent:   Minutes non procedure based      Comments   >50% of visit spent in counseling and coordination of care x    ________________________________________________________________________  Luis Barney MD     Procedures: see electronic medical records for all procedures/Xrays and details which were not copied into this note but were reviewed prior to creation of Plan. LABS:  I reviewed today's most current labs and imaging studies. Pertinent labs include:  No results for input(s): WBC, HGB, HCT, PLT, HGBEXT, HCTEXT, PLTEXT, HGBEXT, HCTEXT, PLTEXT in the last 72 hours. No results for input(s): NA, K, CL, CO2, GLU, BUN, CREA, CA, MG, PHOS, ALB, TBIL, TBILI, SGOT, ALT, INR, INREXT, INREXT in the last 72 hours.     Signed: Rob Montague MD

## 2020-02-17 NOTE — PROGRESS NOTES
Problem: Falls - Risk of  Goal: *Absence of Falls  Description  Document Chi Sow Fall Risk and appropriate interventions in the flowsheet.   Outcome: Progressing Towards Goal  Note: Fall Risk Interventions:       Mentation Interventions: Adequate sleep, hydration, pain control    Medication Interventions: Evaluate medications/consider consulting pharmacy    Elimination Interventions: Call light in reach

## 2020-02-17 NOTE — ROUTINE PROCESS
Oncology End of Shift Note Bedside shift change report given to Anton Orozco (incoming nurse) by Dillon Chew [facility]outgoing [facility]. Report included the following information SBAR, Kardex, Intake/Output and MAR. Shift Summary: requested robitussin once, requested anabel twice, took 1 prune juice and scheduled stool softener/laxative, did not want any additional prn laxatives, wound care done on left foot and scrotum, breathing treatment done twice  , scrotum skin on underside broken open and bleeding, barrier cream put on ears, needs the gray protective piece that goes on the nc Issues for Physician to Address:    
 
Patient on Cardiac Monitoring? [] Yes 
[x] No 
 
Rhythm:   
 
 
 
Shift Events Dillon Chew

## 2020-02-17 NOTE — PROGRESS NOTES
Oncology End of Shift Note      Bedside shift change report given to Jing Womack RN (incoming nurse) by Yehuda Neely (outgoing nurse) on [facility]. Report included the following information SBAR, Kardex, Intake/Output and MAR. Shift Summary: Pt remained stable throughout shift. Scheduled meds given, PRN meds given for constipation. Pt had large BM today. Hourly rounding completed, pt turned q2h, pt has davis. Issues for Physician to Address:       Patient on Cardiac Monitoring?     [] Yes  [x] No    Rhythm:          Yehuda Neely

## 2020-02-17 NOTE — PROGRESS NOTES
PIA:   1) Transfer to the [place] for hospice     11:12 AM- CM placed pt on [facility] will call for tomorrow. CM will follow up with the [place] first thing in the morning regarding the timing of the bariatric bed arrival. Pt, pt's wife and attending informed.      KRYSTLE Daniel, [address]   [phone]

## 2020-02-18 ENCOUNTER — PATIENT OUTREACH (OUTPATIENT)
Dept: CASE MANAGEMENT | Age: 75
End: 2020-02-18

## 2020-02-18 VITALS
TEMPERATURE: 98.5 F | WEIGHT: 315 LBS | SYSTOLIC BLOOD PRESSURE: 152 MMHG | HEART RATE: 75 BPM | BODY MASS INDEX: 44.1 KG/M2 | RESPIRATION RATE: 18 BRPM | OXYGEN SATURATION: 96 % | HEIGHT: 71 IN | DIASTOLIC BLOOD PRESSURE: 58 MMHG

## 2020-02-18 LAB
GLUCOSE BLD STRIP.AUTO-MCNC: 198 MG/DL (ref 65–100)
SERVICE CMNT-IMP: ABNORMAL

## 2020-02-18 PROCEDURE — 77010033678 HC OXYGEN DAILY

## 2020-02-18 PROCEDURE — 94640 AIRWAY INHALATION TREATMENT: CPT

## 2020-02-18 PROCEDURE — 82962 GLUCOSE BLOOD TEST: CPT

## 2020-02-18 PROCEDURE — 74011250637 HC RX REV CODE- 250/637: Performed by: INTERNAL MEDICINE

## 2020-02-18 PROCEDURE — 74011250637 HC RX REV CODE- 250/637: Performed by: NURSE PRACTITIONER

## 2020-02-18 PROCEDURE — 74011000250 HC RX REV CODE- 250: Performed by: INTERNAL MEDICINE

## 2020-02-18 RX ORDER — AMOXICILLIN 250 MG
1 CAPSULE ORAL
Qty: 30 TAB | Refills: 0 | Status: SHIPPED | OUTPATIENT
Start: 2020-02-18 | End: 2020-03-19

## 2020-02-18 RX ORDER — METOPROLOL TARTRATE 25 MG/1
6.25 TABLET, FILM COATED ORAL 2 TIMES DAILY
Qty: 15 TAB | Refills: 0 | Status: SHIPPED
Start: 2020-02-18 | End: 2020-03-19

## 2020-02-18 RX ORDER — ACETAMINOPHEN 500 MG
1000 TABLET ORAL 3 TIMES DAILY
Qty: 180 TAB | Refills: 0 | Status: SHIPPED | OUTPATIENT
Start: 2020-02-18 | End: 2020-03-19

## 2020-02-18 RX ORDER — OXYCODONE HYDROCHLORIDE 5 MG/1
5 TABLET ORAL
Qty: 10 TAB | Refills: 0 | Status: SHIPPED | OUTPATIENT
Start: 2020-02-18 | End: 2020-02-21

## 2020-02-18 RX ORDER — INSULIN LISPRO 100 [IU]/ML
20 INJECTION, SOLUTION INTRAVENOUS; SUBCUTANEOUS
Qty: 1 VIAL | Refills: 1 | Status: SHIPPED
Start: 2020-02-18

## 2020-02-18 RX ORDER — TORSEMIDE 20 MG/1
60 TABLET ORAL DAILY
Qty: 90 TAB | Refills: 0 | Status: SHIPPED
Start: 2020-02-19 | End: 2020-03-20

## 2020-02-18 RX ORDER — INSULIN LISPRO 100 [IU]/ML
INJECTION, SOLUTION INTRAVENOUS; SUBCUTANEOUS
Qty: 1 VIAL | Refills: 0 | Status: SHIPPED
Start: 2020-02-18

## 2020-02-18 RX ADMIN — PANTOPRAZOLE SODIUM 40 MG: 40 TABLET, DELAYED RELEASE ORAL at 05:48

## 2020-02-18 RX ADMIN — OXYCODONE 5 MG: 5 TABLET ORAL at 05:48

## 2020-02-18 RX ADMIN — Medication 10 ML: at 06:00

## 2020-02-18 RX ADMIN — APIXABAN 5 MG: 5 TABLET, FILM COATED ORAL at 08:48

## 2020-02-18 RX ADMIN — FEBUXOSTAT 80 MG: 40 TABLET ORAL at 08:46

## 2020-02-18 RX ADMIN — IPRATROPIUM BROMIDE AND ALBUTEROL SULFATE 3 ML: .5; 3 SOLUTION RESPIRATORY (INHALATION) at 01:01

## 2020-02-18 RX ADMIN — OXYCODONE 5 MG: 5 TABLET ORAL at 10:33

## 2020-02-18 RX ADMIN — TORSEMIDE 60 MG: 20 TABLET ORAL at 08:47

## 2020-02-18 RX ADMIN — GUAIFENESIN 400 MG: 200 SOLUTION ORAL at 06:33

## 2020-02-18 RX ADMIN — METOPROLOL TARTRATE 6.25 MG: 25 TABLET ORAL at 08:47

## 2020-02-18 RX ADMIN — ACETAMINOPHEN 1000 MG: 500 TABLET ORAL at 08:47

## 2020-02-18 NOTE — PROGRESS NOTES
Oncology End of Shift Note      Bedside shift change report given to [facility], RN (incoming nurse) by Amrik Lindo RN (outgoing nurse) on [facility]. Report included the following information SBAR, Kardex, MAR and Recent Results. Shift Summary:   Patient anxious and requested nebulizer twice throughout night. Patient given PRN oxycodone once after pain assessment. Given robitussin due to continuous coughing. Patient took all pills, no insulin coverage needed. Issues for Physician to Address:       Patient on Cardiac Monitoring?    [] Yes  [x] No    Rhythm:          Shift Events      Amrik Lindo RN

## 2020-02-18 NOTE — PROGRESS NOTES
Oncology End of Shift Note      Bedside shift change report given to Monica Mariee RN (incoming nurse) by Valeriy Modi RN (outgoing nurse) on [facility]. Report included the following information SBAR and Kardex. Shift Summary: Pt eating less than normal.  Still requesting PRN pain meds for chronic hip pain. Wife was at bedside around lunchtime. Pt took all meds. Wound care without issue, pt still turning well. Issues for Physician to Address:       Patient on Cardiac Monitoring?     [] Yes  [x] No    Rhythm:                Valeriy Modi RN

## 2020-02-18 NOTE — PROGRESS NOTES
PIA:   1) Transfer to the [place] for hospice     9:00 AM- Medicare pt has received, reviewed, and signed 2nd IM letter informing them of their right to appeal the discharge. Signed copy has been placed on pt bedside chart (verbal consent from pt, he declined signing due to weakness but did give verbal consent). Pt is cleared to d/c from CM perspective, RN informed. RN continuing to contact attending for d/c order and d/c summary, CM will fax once available. 8:32 AM- CM spoke with Essence Roe at the [place] ([phone]); they can accept pt today and pt qualifies for ambulance transportation through the [place]. Anticipate approx pickup time of 10:00 AM. RN please call report to [phone]. CM will fax d/c summary, updated MAR, facesheet and DDNR to [phone]. RN informed. CM will inform pt and pt's wife. CM also informed Essence Roe of wife's request for assistance with transportation to visit pt as well as options for staying close to the hospital due to her not being able to drive on the highway,Gretel will provide wife resources through the [place] to assist.      Care Management Interventions  PCP Verified by CM:  Yes  Transition of Care Consult (CM Consult): Discharge Planning  MyChart Signup: No  Discharge Durable Medical Equipment: No(Patient has cpap, waler , cane and wheelchair at home. )  Physical Therapy Consult: Yes  Occupational Therapy Consult: Yes  Speech Therapy Consult: No  Current Support Network: Lives with Spouse  Confirm Follow Up Transport: Family  The Patient and/or Patient Representative was Provided with a Choice of Provider and Agrees with the Discharge Plan?: Yes  Freedom of Choice List was Provided with Basic Dialogue that Supports the Patient's Individualized Plan of Care/Goals, Treatment Preferences and Shares the Quality Data Associated with the Providers?: Yes  Discharge Location  Discharge Placement: [address] [place] [address], [facility], [address] Manager   [phone]

## 2020-02-18 NOTE — PROGRESS NOTES
I have reviewed discharge instructions with the patient. The patient verbalized understanding. Discharge medications reviewed with patient and appropriate educational materials and side effects teaching were provided. Follow-up appointments reviewed. Opportunity for questions and clarification was provided. Venous access removed without difficulty. Patient's belongings gathered and sent with patient. Patient is ready for discharge. Patient is being discharged with davis per MD order. [facility] and gave report to Dalia lares RN.      Zenaida Chance

## 2020-02-18 NOTE — DISCHARGE SUMMARY
Hospitalist Discharge Summary     Patient ID:  [address] [facility]  [id number]  75 y.o.  1945    PCP on record: Other, MD Brittany    Admit date: 2/5/2020  Discharge date and time: 2/18/2020      DISCHARGE DIAGNOSIS:    Acute on chronic respiratory failure with hypoxia, baseline 2L   Anasarca and Acute on chronic diastolic heart failure   CKD3  Constipation  Hematuria resolved   Penile laceration  Hypervolemic Hyponatremia  Renal cell carcinoma s/p R Nephrectomy  Chronic A-fib, rate controlled  HTN  Chronic pain syndrome  Type 2 diabetes mellitus   CAD (coronary artery disease)  BPH (benign prostatic hyperplasia)  Hyperlipidemia   Gout   Morbid obesity        CONSULTATIONS:  IP CONSULT TO HOSPITALIST  IP CONSULT TO NEPHROLOGY  IP CONSULT TO UROLOGY  IP CONSULT TO PALLIATIVE CARE - PROVIDER  IP CONSULT TO PULMONOLOGY    Excerpted HPI from H&P of Gilles Ocampo MD:  CHIEF COMPLAINT: shortness of breath     HISTORY OF PRESENT ILLNESS:     Philip Maddox is a 75 y.o.  male who presents with shortness of breath. As per patient, he is been chronically sick and has been having worsening swelling of all 4 extremities and has been recently admitted to [place] for diuresis. Pt reported today breathing got much worse and came to ED for evaluation. Pt denies any fever, chills, nausea, vomiting, diarrhea, chest pain, productive cough. In ED pt presented on BiPAP and then placed on Ventimask, pt noted to be on 2L O2 at home. In ED CXR showed CHF pattern.    ______________________________________________________________________  DISCHARGE SUMMARY/HOSPITAL COURSE:  for full details see H&P, daily progress notes, labs, consult notes. This is a 76-year-old male [place] patient with history of metastatic renal carcinoma who was admitted initially for acute on chronic respiratory failure with hypoxia secondary to acute on chronic diastolic CHF with anasarca.   Received few days of IV Bumex along with metolazone without complete relief of the anasarca. Patient still requiring 5 L of oxygen. After discussion with palliative care team patient decided on comfort measures . Patient is being transferred to [facility] on Tuesday 02/18  in the palliative care unit. Assessment / Plan: (taken from last PN)  Acute on chronic respiratory failure with hypoxia, baseline 2L   Anasarca  Due to Acute on chronic diastolic heart failure  CKD3  -initially on BIPAP but able to wean off to just NC oxygen  -attempted aggressive diuresis with IV bumex, diuril and metolazone. Patient's peripheral edema improved some but he did not lose weight and actually gained. Appreciate Nephrology input and management. Currently stable on torsemide  -UA negative for proteinuria   Renal US showed :  1. Left kidney is within normal limits. 2. Patient is status post right nephrectomy. In the right nephrectomy bed, there  is a 4.5 cm nonspecific hypoechoic mass. 3. Urinary bladder is unremarkable.     -after some discussion with the palliative care team, patient has decided to focus on comfort. Patient changed his CODE STATUS to DNR  -patient will be transferred to the palliative unit at [facility]      Constipation  -s/p fleet enema and soap studs enema and had good response.  -continue with pericolace and miralax prn     Hematuria resolved   Penile laceration   uro consulted s/p  intermittent bladder irrigation and davis changed on 02/06.   Hematuria resolved  Hb stable , eliquis restarted   Will leave davis for comfort      Hypervolemic Hyponatremia  -secondary to CHF  -c/w torsemide -     B/l leg redness without warmth and tenderness  No signs of cellulitis      Renal cell carcinoma s/p R Nephrectomy     Chronic A-fib, rate controlled  HTN  C/w eliquis  And BB      Chronic pain   Continue Percocet     Type 2 diabetes mellitus   C/w lantus at  55units and  lipsro  20units TIDAC +SSI      CAD (coronary artery disease)     BPH (benign prostatic hyperplasia)  Continue Hytrin and Finasteride     Hyperlipidemia   Continue statins     Gout   Continue Uloric     Morbid obesity      _______________________________________________________________________  Patient seen and examined by me on discharge day. Pertinent Findings:  Gen:    Not in distress  Chest: Diminished BS  CVS:   Regular rhythm.  ++ LE and UE edema  Abd:  Soft, not distended, not tender  Neuro:  Alert, Oriented x 4, grossly non focal exam but very weak LE  _______________________________________________________________________  DISCHARGE MEDICATIONS:   Current Discharge Medication List      START taking these medications    Details   acetaminophen (TYLENOL) 500 mg tablet Take 2 Tabs by mouth three (3) times daily for 30 days. Qty: 180 Tab, Refills: 0      senna-docusate (PERICOLACE) 8.6-50 mg per tablet Take 1 Tab by mouth nightly for 30 days. Qty: 30 Tab, Refills: 0      oxyCODONE IR (ROXICODONE) 5 mg immediate release tablet Take 1 Tab by mouth every four (4) hours as needed for Pain for up to 3 days. Max Daily Amount: 30 mg.  Qty: 10 Tab, Refills: 0    Associated Diagnoses: Other chronic pain         CONTINUE these medications which have CHANGED    Details   !! insulin lispro (HUMALOG) 100 unit/mL injection 20 Units by SubCUTAneous route Before breakfast, lunch, and dinner. Qty: 1 Vial, Refills: 1      !! insulin lispro (HUMALOG) 100 unit/mL injection INITIATE CORRECTIVE INSULIN PROTOCOL (AUSTEN):  RX AUSTEN Normal Sensitivity (Average weight)    AC (before meals), Q6H, and Q4H CORRECTIONAL SCALE only For Blood Sugar (mg/dl) of :             140-199=2 units            200-249=3 units  250-299=5 units  300-349=7 units  350 or greater = Call MD  Give in addition to basal medications. Do Not Hold for NPO    BEDTIME CORRECTIONAL sliding scale when scheduled:  200-249=2 units  250-299=3 units   300-349=4 units  350 or greater = Call MD  Give in addition to basal medications.   Do Not Hold for NPO Fast Acting - Administer Immediately - or within 15 minutes of start of meal, if mealtime coverage. Qty: 1 Vial, Refills: 0      metoprolol tartrate (LOPRESSOR) 25 mg tablet Take 0.25 Tabs by mouth two (2) times a day for 30 days. Qty: 15 Tab, Refills: 0      torsemide (DEMADEX) 20 mg tablet Take 3 Tabs by mouth daily for 30 days. Qty: 90 Tab, Refills: 0       !! - Potential duplicate medications found. Please discuss with provider. CONTINUE these medications which have NOT CHANGED    Details   albuterol-ipratropium (DUO-NEB) 2.5 mg-0.5 mg/3 ml nebu 3 mL by Nebulization route every six (6) hours as needed for Wheezing. pantoprazole (PROTONIX) 40 mg tablet Take 40 mg by mouth daily. apixaban (ELIQUIS) 5 mg tablet Take 1 Tab by mouth every twelve (12) hours. Qty: 60 Tab, Refills: 0      cabozantinib 40 mg tab Take 40 mg by mouth daily. cyanocobalamin (VITAMIN B12) 250 mcg tablet Take 1,000 mcg by mouth two (2) times a day. cholecalciferol (VITAMIN D3) 25 mcg (1,000 unit) cap Take 2,000 Units by mouth daily. folic acid (FOLVITE) 1 mg tablet Take 2 tabs by mouth every morning and Take 3 tabs by mouth at bedtime      finasteride (PROSCAR) 5 mg tablet Take 5 mg by mouth nightly. terazosin (HYTRIN) 10 mg capsule Take 2 mg by mouth nightly. atorvastatin (LIPITOR) 80 mg tablet Take 40 mg by mouth nightly. febuxostat (ULORIC) 80 mg tab tablet Take 80 mg by mouth daily. insulin glargine (LANTUS,BASAGLAR) 100 unit/mL (3 mL) inpn 50 Units by SubCUTAneous route daily.          STOP taking these medications       isosorbide mononitrate ER (IMDUR) 30 mg tablet Comments:   Reason for Stopping:         ketoconazole (NIZORAL) 2 % shampoo Comments:   Reason for Stopping:         pyridoxine, vitamin B6, (VITAMIN B-6) 50 mg cap Comments:   Reason for Stopping:         multivitamin (ONE A DAY) tablet Comments:   Reason for Stopping:         nitroglycerin (NITROSTAT) 0.4 mg SL tablet Comments:   Reason for Stopping:         oxyCODONE-acetaminophen (PERCOCET) 5-325 mg per tablet Comments:   Reason for Stopping:         docusate sodium (COLACE) 100 mg capsule Comments:   Reason for Stopping:         sennosides (SENNA) 8.6 mg cap Comments:   Reason for Stopping:         potassium chloride SR (KLOR-CON 10) 10 mEq tablet Comments:   Reason for Stopping:         raNITIdine (ZANTAC) 150 mg tablet Comments:   Reason for Stopping:               My Recommended Diet, Activity, Wound Care, and follow-up labs are listed in the patient's Discharge Insturctions which I have personally completed and reviewed.   Risk of deterioration: Low    Condition at Discharge:  Stable  _____________________________________________________________________    Disposition  Palliative care unit at [facility]  ____________________________________________________________________    Care Plan discussed with:   Patient, Family, RN, Care Manager, Consultant    ____________________________________________________________________    Code Status: DNR/DNI  ____________________________________________________________________      Condition at Discharge:  Stable  _____________________________________________________________________  Follow up with:   PCP : Brittany Patel MD  Follow-up Information     Follow up With Specialties Details Why Contact Info    Patient's Own Medication is located in the Patient's:  Py[employer] Bin  (Med Onc)        Brittany Patel MD    Patient can only remember the practice name and not the physician                Total time in minutes spent coordinating this discharge (includes going over instructions, follow-up, prescriptions, and preparing report for sign off to her PCP) :  35 minutes    Signed:  Lissett Hendricks MD

## 2020-02-18 NOTE — DISCHARGE INSTRUCTIONS
HOSPITALIST DISCHARGE INSTRUCTIONS    NAME: Jennifer Smith Benton   :  1945   MRN:  [id number]     Date/Time:  2020 10:26 AM    ADMIT DATE: 2020   DISCHARGE DATE: 2020     Attending Physician: Brenda Molina MD      Medications: Per above medication reconciliation. Pain Management: per above medications    Recommended diet: Diabetic Diet    Recommended activity: Activity as tolerated    Wound care:   Penis skin tear: BID, cleanse with NS moist 4x4 and apply antibacterial ointment, leave [place]. Left heel: daily, paint wound with betadine/povidine, let air dry and leave [place] and floating. Indwelling devices: Guzman catheter, chronic with care per routine    Supplemental Oxygen: 5 LNC,  wean as tolerated    Required Lab work:  Weekly BMP    Glucose management:  Accucheck ACHS with sliding scale per SNF protocol    Code status: DNR        Outside physician follow up: Follow-up Information     Follow up With Specialties Details Why Contact Info    Patient's Own Medication is located in the Patient's:  Cole Fontaine  (Brittany Burt MD    Patient can only remember the practice name and not the physician                 Skilled nursing facility/ SNF MD responsible for above on discharge. Information obtained by :  I understand that if any problems occur once I am at home I am to contact my physician. I understand and acknowledge receipt of the instructions indicated above.                                                                                                                                            Physician's or R.N.'s Signature                                                                  Date/Time                                                                                                                                              Patient or Repres

## 2020-02-19 ENCOUNTER — PATIENT OUTREACH (OUTPATIENT)
Dept: FAMILY MEDICINE CLINIC | Age: 75
End: 2020-02-19

## 2020-02-19 NOTE — PROGRESS NOTES
Patient actively a bundle patient that was discharge to the [place] inpatient hospice unit. LPN Care Coordinator will follow up with the hospice company every 30 days for the next 90 days.

## 2020-03-23 ENCOUNTER — PATIENT OUTREACH (OUTPATIENT)
Dept: CASE MANAGEMENT | Age: 75
End: 2020-03-23

## 2020-03-24 NOTE — PROGRESS NOTES
03/23/2020 @ 10:22am attempted contact with [address] to determine patient's status with them for PIA. Left vm with contact information. Cc'd Alee Mg, CCM  This note will not be viewable in [address]

## 2022-11-07 NOTE — PROGRESS NOTES
Hospitalist Progress Note    NAME: Nat Mcdaniel   :  1945   MRN:  [id number]       Assessment / Plan:  Acute on chronic respiratory failure with hypoxia, baseline 2L   Anasarca  [place] with CKD3  Due to Acute on chronic diastolic heart failure  PRN BiPAP  Given 1mg IV Bumex in ED without diuresis, s/p 2mg IV BID,increased to bumex 3mg q8h per nephro . Also ordered  Diuril 500mg IV once   Receive a dose of metalozone 5mg PO on   Monitor I/Os, daily weight and lytes  Appreciate Nephrology input and management   UA negative for proteinuria   Renal US showed :  1. Left kidney is within normal limits. 2. Patient is status post right nephrectomy. In the right nephrectomy bed, there  is a 4.5 cm nonspecific hypoechoic mass. 3. Urinary bladder is unremarkable.       -UO 3 L in the last 24 hr  -breathing better now  -Cr stable from   -electrolytes are stable too      -UO 1800 CC IN THE LAST 24 HR  - breathing cont to improve but not back to his baseline yet, as per patient   - Cr improving today 2.4 from 2.6 yesterday  -on 3 L NC  - he would like to talk to hospice care      Hematuria   Penile lesion   uro consulted , persistent hematuria ,had intermittent bladder irrigation and davis changed on   Hb stable , monitor H&H q8h as pt on eliquis       -resume Eliquis 5 mg BID as per Urology recommendations  - no further hematuria       -Urine is still clear without obvious blood   -Hbg 8.1 from 8.4 yesterday, will cont to monitor now and if cont to drop then we will consider holding AC again    Hypervolemic Hypernatremia  -secondary to CHF  -cont Bumix to treat the hypervolemia   - f/u BMP daily      -Hyponatremia improved Na 137 today from 133 yesterday     B/l leg redness without warmth and tenderness  Monitor leg swelling as risk for cellulitis, low threshold to start abx     Renal cell carcinoma s/p R Nephrectomy     Chronic A-fib, rate controlled  HTN  Mild bradycardia, doesn't remember the exact dose for metoprolol and claims meds has been adjusted recently  metoprolol dose 12.5mg bid ( decreased from home dose) with holding parameters     2/8  -resume Eliquis 5 mg BID as per Urology recommendations    2/9  -Hbg 8.1 from 8.4 yesterday, will cont to monitor now and if cont to drop then we will consider holding AC again    Chronic pain   Continue Percocet     Type 2 diabetes mellitus   lantus to 40 units, increased from home dose due to hyperglycemia  lipsro 5units TIDAC   Correction scale      CAD (coronary artery disease)     BPH (benign prostatic hyperplasia)  Continue Hytrin and Finesteride     Hyperlipidemia   Continue statins     Gout   Continue Uloric    Morbid obesity      Code Status: Full  Surrogate Decision Maker: Wife     DVT Prophylaxis: Eliquis  GI Prophylaxis: not indicated     Baseline: functional      40 or above Morbid obesity / Body mass index is 50.49 kg/m². Subjective:     Chief Complaint / Reason for Physician Visit  FU CHF/hematuria/hyponatremia . Still with anasarca and SOB , requiring 3 liter of oxygen      Discussed with RN events overnight. Wife at bedside, all questions were answered. Patient reported \" I would like to talk to hospice\". Review of Systems:  Symptom Y/N Comments  Symptom Y/N Comments   Fever/Chills    Chest Pain n    Poor Appetite    Edema y    Cough    Abdominal Pain n    Sputum    Joint Pain     SOB/LOMBARDI y improving   Pruritis/Rash     Nausea/vomit n   Tolerating PT/OT     Diarrhea    Tolerating Diet y    Constipation    Other             Objective:     VITALS:   Last 24hrs VS reviewed since prior progress note.  Most recent are:  Patient Vitals for the past 24 hrs:   Temp Pulse Resp BP SpO2   02/09/20 0821 97.7 °F (36.5 °C) 80 20 134/61 97 %   02/09/20 0815     93 %   02/09/20 0259 98 °F (36.7 °C) 81 17 154/54 90 %   02/08/20 2234 97.7 °F (36.5 °C) 76 17 120/63 90 %   02/08/20 1955 98 °F (36.7 °C) 83 15 127/63 93 %   02/08/20 1918     95 %   02/08/20 1832  76 16  94 %   02/08/20 1627     95 %   02/08/20 1549 97.5 °F (36.4 °C) 79 18 (!) 116/39 96 %   02/08/20 1410     95 %   02/08/20 1025 99.5 °F (37.5 °C) 92 16 131/47 93 %       Intake/Output Summary (Last 24 hours) at 2/9/2020 0938  Last data filed at 2/9/2020 0511  Gross per 24 hour   Intake 570 ml   Output 1800 ml   Net -1230 ml        PHYSICAL EXAM:  General: WD, WN. Alert, cooperative, no acute distress    EENT:  EOMI. Anicteric sclerae. MMM  Resp:  Diminished breath sounds b/l  , no wheezing or rales. No accessory muscle use  CV:  Regular  rhythm, LE  Edema 3+ , anasarca   GI:  Soft, Non distended, Non tender.  +Bowel sounds  Neurologic:  Alert and oriented X 3, normal speech,   Psych:   Good insight. Not anxious nor agitated  Skin:  No rashes. No jaundice, petichae on b/l legs   Penile lesion   : swollen testicles and hematuria in davis bag     Reviewed most current lab test results and cultures  YES  Reviewed most current radiology test results   YES  Review and summation of old records today    NO  Reviewed patient's current orders and MAR    YES  PMH/ reviewed - no change compared to H&P  ________________________________________________________________________  Care Plan discussed with:    Comments   Patient x    Family  X    RN x    Care Manager     Consultant                        Multidiciplinary team rounds were held today with , nursing, pharmacist and clinical coordinator. Patient's plan of care was discussed; medications were reviewed and discharge planning was addressed.      ________________________________________________________________________  Total NON critical care TIME:  35  Minutes    Total CRITICAL CARE TIME Spent:   Minutes non procedure based      Comments   >50% of visit spent in counseling and coordination of care x    ________________________________________________________________________  Nate Vizcarra MD Procedures: see electronic medical records for all procedures/Xrays and details which were not copied into this note but were reviewed prior to creation of Plan. LABS:  I reviewed today's most current labs and imaging studies.   Pertinent labs include:  Recent Labs     02/09/20 0258 02/08/20  0044 02/07/20  1052 02/07/20  0017   WBC 9.1 11.5*  --  7.3   HGB 8.1* 8.4* 8.6* 8.4*   HCT 26.6* 27.4* 27.8* 27.8*    209  --  230     Recent Labs     02/09/20 0258 02/08/20  0044 02/07/20  0017    133* 133*   K 3.8 4.2 4.8   CL 96* 95* 96*   CO2 33* 31 30   * 224* 199*   BUN 69* 66* 65*   CREA 1.71* 1.81* 1.82*   CA 8.8 8.5 8.5  8.6   PHOS 3.3 2.8  --    ALB 2.4* 2.6* 2.6*   TBILI 0.9 0.7 0.6   SGOT 24 22 26   ALT 20 20 20       Signed: Slime Kim MD Statement Selected